# Patient Record
Sex: FEMALE | Race: WHITE | Employment: OTHER | URBAN - METROPOLITAN AREA
[De-identification: names, ages, dates, MRNs, and addresses within clinical notes are randomized per-mention and may not be internally consistent; named-entity substitution may affect disease eponyms.]

---

## 2022-02-09 ENCOUNTER — APPOINTMENT (OUTPATIENT)
Dept: GENERAL RADIOLOGY | Age: 77
DRG: 493 | End: 2022-02-09
Payer: COMMERCIAL

## 2022-02-09 ENCOUNTER — APPOINTMENT (OUTPATIENT)
Dept: CT IMAGING | Age: 77
DRG: 493 | End: 2022-02-09
Payer: COMMERCIAL

## 2022-02-09 ENCOUNTER — HOSPITAL ENCOUNTER (INPATIENT)
Age: 77
LOS: 3 days | Discharge: HOME HEALTH | DRG: 493 | End: 2022-02-12
Attending: EMERGENCY MEDICINE | Admitting: SURGERY
Payer: COMMERCIAL
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
## 2022-02-09 DIAGNOSIS — S82.409A: ICD-10-CM

## 2022-02-09 DIAGNOSIS — S82.209A: ICD-10-CM

## 2022-02-09 LAB
ABO/RH: NORMAL
ALLEN TEST: ABNORMAL
ANION GAP SERPL CALCULATED.3IONS-SCNC: 13 MMOL/L (ref 9–17)
ANTIBODY SCREEN: NEGATIVE
ARM BAND NUMBER: NORMAL
BLOOD BANK SPECIMEN: ABNORMAL
BNP INTERPRETATION: NORMAL
BUN BLDV-MCNC: 15 MG/DL (ref 8–23)
CARBOXYHEMOGLOBIN: 1.6 % (ref 0–5)
CHLORIDE BLD-SCNC: 98 MMOL/L (ref 98–107)
CO2: 21 MMOL/L (ref 20–31)
CREAT SERPL-MCNC: 0.94 MG/DL (ref 0.5–0.9)
ESTIMATED AVERAGE GLUCOSE: 166 MG/DL
ETHANOL PERCENT: <0.01 %
ETHANOL: <10 MG/DL
EXPIRATION DATE: NORMAL
FIO2: ABNORMAL
GFR AFRICAN AMERICAN: >60 ML/MIN
GFR NON-AFRICAN AMERICAN: 58 ML/MIN
GFR SERPL CREATININE-BSD FRML MDRD: ABNORMAL ML/MIN/{1.73_M2}
GFR SERPL CREATININE-BSD FRML MDRD: ABNORMAL ML/MIN/{1.73_M2}
GLUCOSE BLD-MCNC: 139 MG/DL (ref 65–105)
GLUCOSE BLD-MCNC: 152 MG/DL (ref 65–105)
GLUCOSE BLD-MCNC: 192 MG/DL (ref 70–99)
GLUCOSE BLD-MCNC: 224 MG/DL (ref 65–105)
HBA1C MFR BLD: 7.4 % (ref 4–6)
HCG QUALITATIVE: ABNORMAL
HCO3 VENOUS: 24.3 MMOL/L (ref 24–30)
HCT VFR BLD CALC: 47 % (ref 36.3–47.1)
HEMOGLOBIN: 14.4 G/DL (ref 11.9–15.1)
INR BLD: 0.9
MCH RBC QN AUTO: 25.9 PG (ref 25.2–33.5)
MCHC RBC AUTO-ENTMCNC: 30.6 G/DL (ref 28.4–34.8)
MCV RBC AUTO: 84.7 FL (ref 82.6–102.9)
METHEMOGLOBIN: ABNORMAL % (ref 0–1.5)
MODE: ABNORMAL
MYOGLOBIN: 92 NG/ML (ref 25–58)
NEGATIVE BASE EXCESS, VEN: 2.5 MMOL/L (ref 0–2)
NOTIFICATION TIME: ABNORMAL
NOTIFICATION: ABNORMAL
NRBC AUTOMATED: 0 PER 100 WBC
O2 DEVICE/FLOW/%: ABNORMAL
O2 SAT, VEN: 74.3 % (ref 60–85)
OXYHEMOGLOBIN: ABNORMAL % (ref 95–98)
PARTIAL THROMBOPLASTIN TIME: 23.8 SEC (ref 20.5–30.5)
PATIENT TEMP: 37
PCO2, VEN, TEMP ADJ: ABNORMAL MMHG (ref 39–55)
PCO2, VEN: 51.8 (ref 39–55)
PDW BLD-RTO: 14.6 % (ref 11.8–14.4)
PEEP/CPAP: ABNORMAL
PH VENOUS: 7.29 (ref 7.32–7.42)
PH, VEN, TEMP ADJ: ABNORMAL (ref 7.32–7.42)
PLATELET # BLD: 350 K/UL (ref 138–453)
PMV BLD AUTO: 9.3 FL (ref 8.1–13.5)
PO2, VEN, TEMP ADJ: ABNORMAL MMHG (ref 30–50)
PO2, VEN: 43.6 (ref 30–50)
POSITIVE BASE EXCESS, VEN: ABNORMAL MMOL/L (ref 0–2)
POTASSIUM SERPL-SCNC: 4.5 MMOL/L (ref 3.7–5.3)
PRO-BNP: 135 PG/ML
PROTHROMBIN TIME: 10.2 SEC (ref 9.1–12.3)
PSV: ABNORMAL
PT. POSITION: ABNORMAL
RBC # BLD: 5.55 M/UL (ref 3.95–5.11)
RESPIRATORY RATE: ABNORMAL
SAMPLE SITE: ABNORMAL
SARS-COV-2, RAPID: NOT DETECTED
SET RATE: ABNORMAL
SODIUM BLD-SCNC: 132 MMOL/L (ref 135–144)
SPECIMEN DESCRIPTION: NORMAL
TEXT FOR RESPIRATORY: ABNORMAL
TOTAL CK: 215 U/L (ref 26–192)
TOTAL HB: ABNORMAL G/DL (ref 12–16)
TOTAL RATE: ABNORMAL
TROPONIN INTERP: ABNORMAL
TROPONIN T: ABNORMAL NG/ML
TROPONIN, HIGH SENSITIVITY: 14 NG/L (ref 0–14)
VITAMIN D 25-HYDROXY: 9.4 NG/ML (ref 30–100)
VT: ABNORMAL
WBC # BLD: 12.7 K/UL (ref 3.5–11.3)

## 2022-02-09 PROCEDURE — 84703 CHORIONIC GONADOTROPIN ASSAY: CPT

## 2022-02-09 PROCEDURE — 71260 CT THORAX DX C+: CPT

## 2022-02-09 PROCEDURE — 6370000000 HC RX 637 (ALT 250 FOR IP): Performed by: STUDENT IN AN ORGANIZED HEALTH CARE EDUCATION/TRAINING PROGRAM

## 2022-02-09 PROCEDURE — 94761 N-INVAS EAR/PLS OXIMETRY MLT: CPT

## 2022-02-09 PROCEDURE — 82550 ASSAY OF CK (CPK): CPT

## 2022-02-09 PROCEDURE — 85027 COMPLETE CBC AUTOMATED: CPT

## 2022-02-09 PROCEDURE — 73610 X-RAY EXAM OF ANKLE: CPT

## 2022-02-09 PROCEDURE — 2580000003 HC RX 258: Performed by: STUDENT IN AN ORGANIZED HEALTH CARE EDUCATION/TRAINING PROGRAM

## 2022-02-09 PROCEDURE — 96374 THER/PROPH/DIAG INJ IV PUSH: CPT

## 2022-02-09 PROCEDURE — 6810039001 HC L1 TRAUMA PRIORITY

## 2022-02-09 PROCEDURE — 64450 NJX AA&/STRD OTHER PN/BRANCH: CPT

## 2022-02-09 PROCEDURE — 85610 PROTHROMBIN TIME: CPT

## 2022-02-09 PROCEDURE — 72125 CT NECK SPINE W/O DYE: CPT

## 2022-02-09 PROCEDURE — 84520 ASSAY OF UREA NITROGEN: CPT

## 2022-02-09 PROCEDURE — 87635 SARS-COV-2 COVID-19 AMP PRB: CPT

## 2022-02-09 PROCEDURE — 83036 HEMOGLOBIN GLYCOSYLATED A1C: CPT

## 2022-02-09 PROCEDURE — 84484 ASSAY OF TROPONIN QUANT: CPT

## 2022-02-09 PROCEDURE — 6360000004 HC RX CONTRAST MEDICATION: Performed by: SURGERY

## 2022-02-09 PROCEDURE — 83874 ASSAY OF MYOGLOBIN: CPT

## 2022-02-09 PROCEDURE — 3209999900 CT LUMBAR SPINE TRAUMA RECONSTRUCTION

## 2022-02-09 PROCEDURE — 86850 RBC ANTIBODY SCREEN: CPT

## 2022-02-09 PROCEDURE — 80051 ELECTROLYTE PANEL: CPT

## 2022-02-09 PROCEDURE — 82805 BLOOD GASES W/O2 SATURATION: CPT

## 2022-02-09 PROCEDURE — 82306 VITAMIN D 25 HYDROXY: CPT

## 2022-02-09 PROCEDURE — 2060000000 HC ICU INTERMEDIATE R&B

## 2022-02-09 PROCEDURE — 86901 BLOOD TYPING SEROLOGIC RH(D): CPT

## 2022-02-09 PROCEDURE — 6360000002 HC RX W HCPCS

## 2022-02-09 PROCEDURE — 85730 THROMBOPLASTIN TIME PARTIAL: CPT

## 2022-02-09 PROCEDURE — 93005 ELECTROCARDIOGRAM TRACING: CPT | Performed by: STUDENT IN AN ORGANIZED HEALTH CARE EDUCATION/TRAINING PROGRAM

## 2022-02-09 PROCEDURE — 6360000002 HC RX W HCPCS: Performed by: STUDENT IN AN ORGANIZED HEALTH CARE EDUCATION/TRAINING PROGRAM

## 2022-02-09 PROCEDURE — 83880 ASSAY OF NATRIURETIC PEPTIDE: CPT

## 2022-02-09 PROCEDURE — 6370000000 HC RX 637 (ALT 250 FOR IP): Performed by: NURSE PRACTITIONER

## 2022-02-09 PROCEDURE — 3209999900 CT THORACIC SPINE TRAUMA RECONSTRUCTION

## 2022-02-09 PROCEDURE — 70450 CT HEAD/BRAIN W/O DYE: CPT

## 2022-02-09 PROCEDURE — G0480 DRUG TEST DEF 1-7 CLASSES: HCPCS

## 2022-02-09 PROCEDURE — 73590 X-RAY EXAM OF LOWER LEG: CPT

## 2022-02-09 PROCEDURE — 82947 ASSAY GLUCOSE BLOOD QUANT: CPT

## 2022-02-09 PROCEDURE — 73030 X-RAY EXAM OF SHOULDER: CPT

## 2022-02-09 PROCEDURE — 86900 BLOOD TYPING SEROLOGIC ABO: CPT

## 2022-02-09 PROCEDURE — 82565 ASSAY OF CREATININE: CPT

## 2022-02-09 PROCEDURE — 0QSHXZZ REPOSITION LEFT TIBIA, EXTERNAL APPROACH: ICD-10-PCS | Performed by: ORTHOPAEDIC SURGERY

## 2022-02-09 PROCEDURE — 99285 EMERGENCY DEPT VISIT HI MDM: CPT

## 2022-02-09 RX ORDER — DEXTROSE MONOHYDRATE 50 MG/ML
100 INJECTION, SOLUTION INTRAVENOUS PRN
Status: DISCONTINUED | OUTPATIENT
Start: 2022-02-09 | End: 2022-02-12 | Stop reason: HOSPADM

## 2022-02-09 RX ORDER — POLYETHYLENE GLYCOL 3350 17 G/17G
17 POWDER, FOR SOLUTION ORAL DAILY
Status: DISCONTINUED | OUTPATIENT
Start: 2022-02-09 | End: 2022-02-12 | Stop reason: HOSPADM

## 2022-02-09 RX ORDER — LIDOCAINE HYDROCHLORIDE 10 MG/ML
20 INJECTION, SOLUTION INFILTRATION; PERINEURAL ONCE
Status: DISCONTINUED | OUTPATIENT
Start: 2022-02-09 | End: 2022-02-11

## 2022-02-09 RX ORDER — DICYCLOMINE HYDROCHLORIDE 10 MG/1
10 CAPSULE ORAL 2 TIMES DAILY PRN
COMMUNITY

## 2022-02-09 RX ORDER — SODIUM CHLORIDE 0.9 % (FLUSH) 0.9 %
5-40 SYRINGE (ML) INJECTION PRN
Status: DISCONTINUED | OUTPATIENT
Start: 2022-02-09 | End: 2022-02-12 | Stop reason: HOSPADM

## 2022-02-09 RX ORDER — GABAPENTIN 300 MG/1
300 CAPSULE ORAL EVERY 8 HOURS
Status: DISCONTINUED | OUTPATIENT
Start: 2022-02-09 | End: 2022-02-12 | Stop reason: HOSPADM

## 2022-02-09 RX ORDER — BISACODYL 10 MG
10 SUPPOSITORY, RECTAL RECTAL DAILY PRN
Status: DISCONTINUED | OUTPATIENT
Start: 2022-02-09 | End: 2022-02-12 | Stop reason: HOSPADM

## 2022-02-09 RX ORDER — NICOTINE POLACRILEX 4 MG
15 LOZENGE BUCCAL PRN
Status: DISCONTINUED | OUTPATIENT
Start: 2022-02-09 | End: 2022-02-12 | Stop reason: HOSPADM

## 2022-02-09 RX ORDER — BRIMONIDINE TARTRATE 2 MG/ML
1 SOLUTION/ DROPS OPHTHALMIC 2 TIMES DAILY
COMMUNITY
End: 2022-04-26 | Stop reason: ALTCHOICE

## 2022-02-09 RX ORDER — OXYCODONE HYDROCHLORIDE 5 MG/1
5 TABLET ORAL EVERY 6 HOURS PRN
Status: DISCONTINUED | OUTPATIENT
Start: 2022-02-09 | End: 2022-02-12

## 2022-02-09 RX ORDER — METHOCARBAMOL 750 MG/1
750 TABLET, FILM COATED ORAL EVERY 6 HOURS
Status: DISCONTINUED | OUTPATIENT
Start: 2022-02-09 | End: 2022-02-12 | Stop reason: HOSPADM

## 2022-02-09 RX ORDER — BUSPIRONE HYDROCHLORIDE 5 MG/1
7.5 TABLET ORAL 2 TIMES DAILY
Status: DISCONTINUED | OUTPATIENT
Start: 2022-02-09 | End: 2022-02-12 | Stop reason: HOSPADM

## 2022-02-09 RX ORDER — SODIUM CHLORIDE, SODIUM LACTATE, POTASSIUM CHLORIDE, CALCIUM CHLORIDE 600; 310; 30; 20 MG/100ML; MG/100ML; MG/100ML; MG/100ML
INJECTION, SOLUTION INTRAVENOUS CONTINUOUS
Status: DISCONTINUED | OUTPATIENT
Start: 2022-02-09 | End: 2022-02-09

## 2022-02-09 RX ORDER — ONDANSETRON 4 MG/1
4 TABLET, ORALLY DISINTEGRATING ORAL EVERY 8 HOURS PRN
Status: DISCONTINUED | OUTPATIENT
Start: 2022-02-09 | End: 2022-02-12 | Stop reason: HOSPADM

## 2022-02-09 RX ORDER — FENTANYL CITRATE 50 UG/ML
50 INJECTION, SOLUTION INTRAMUSCULAR; INTRAVENOUS ONCE
Status: COMPLETED | OUTPATIENT
Start: 2022-02-09 | End: 2022-02-09

## 2022-02-09 RX ORDER — FENTANYL CITRATE 50 UG/ML
INJECTION, SOLUTION INTRAMUSCULAR; INTRAVENOUS
Status: DISCONTINUED
Start: 2022-02-09 | End: 2022-02-09

## 2022-02-09 RX ORDER — ACETAMINOPHEN 500 MG
1000 TABLET ORAL EVERY 8 HOURS SCHEDULED
Status: DISCONTINUED | OUTPATIENT
Start: 2022-02-09 | End: 2022-02-12 | Stop reason: HOSPADM

## 2022-02-09 RX ORDER — BUSPIRONE HYDROCHLORIDE 7.5 MG/1
7.5 TABLET ORAL 2 TIMES DAILY
COMMUNITY
Start: 2021-11-23

## 2022-02-09 RX ORDER — ALENDRONATE SODIUM 70 MG/1
70 TABLET ORAL WEEKLY
COMMUNITY

## 2022-02-09 RX ORDER — ASPIRIN 81 MG/1
81 TABLET ORAL DAILY
Status: ON HOLD | COMMUNITY
End: 2022-04-27 | Stop reason: HOSPADM

## 2022-02-09 RX ORDER — FUROSEMIDE 40 MG/1
TABLET ORAL
COMMUNITY
Start: 2022-01-26 | End: 2022-04-26 | Stop reason: ALTCHOICE

## 2022-02-09 RX ORDER — SODIUM PHOSPHATE, DIBASIC AND SODIUM PHOSPHATE, MONOBASIC 7; 19 G/133ML; G/133ML
1 ENEMA RECTAL DAILY PRN
Status: DISCONTINUED | OUTPATIENT
Start: 2022-02-09 | End: 2022-02-10

## 2022-02-09 RX ORDER — SODIUM CHLORIDE 0.9 % (FLUSH) 0.9 %
5-40 SYRINGE (ML) INJECTION EVERY 12 HOURS SCHEDULED
Status: DISCONTINUED | OUTPATIENT
Start: 2022-02-09 | End: 2022-02-12 | Stop reason: HOSPADM

## 2022-02-09 RX ORDER — ONDANSETRON 2 MG/ML
4 INJECTION INTRAMUSCULAR; INTRAVENOUS EVERY 6 HOURS PRN
Status: DISCONTINUED | OUTPATIENT
Start: 2022-02-09 | End: 2022-02-12 | Stop reason: HOSPADM

## 2022-02-09 RX ORDER — SODIUM CHLORIDE 9 MG/ML
25 INJECTION, SOLUTION INTRAVENOUS PRN
Status: DISCONTINUED | OUTPATIENT
Start: 2022-02-09 | End: 2022-02-12 | Stop reason: HOSPADM

## 2022-02-09 RX ORDER — SACUBITRIL AND VALSARTAN 49; 51 MG/1; MG/1
1 TABLET, FILM COATED ORAL 2 TIMES DAILY
COMMUNITY
Start: 2021-12-08

## 2022-02-09 RX ORDER — CITALOPRAM 20 MG/1
40 TABLET ORAL DAILY
Status: DISCONTINUED | OUTPATIENT
Start: 2022-02-09 | End: 2022-02-12 | Stop reason: HOSPADM

## 2022-02-09 RX ORDER — CITALOPRAM 40 MG/1
TABLET ORAL
COMMUNITY
Start: 2021-11-29

## 2022-02-09 RX ORDER — DEXTROSE MONOHYDRATE 25 G/50ML
12.5 INJECTION, SOLUTION INTRAVENOUS PRN
Status: DISCONTINUED | OUTPATIENT
Start: 2022-02-09 | End: 2022-02-12 | Stop reason: HOSPADM

## 2022-02-09 RX ORDER — ERGOCALCIFEROL 1.25 MG/1
50000 CAPSULE ORAL WEEKLY
Qty: 8 CAPSULE | Refills: 0 | Status: SHIPPED | OUTPATIENT
Start: 2022-02-09 | End: 2022-03-31

## 2022-02-09 RX ADMIN — GABAPENTIN 300 MG: 300 CAPSULE ORAL at 13:31

## 2022-02-09 RX ADMIN — FENTANYL CITRATE 50 MCG: 50 INJECTION, SOLUTION INTRAMUSCULAR; INTRAVENOUS at 23:15

## 2022-02-09 RX ADMIN — INSULIN LISPRO 6 UNITS: 100 INJECTION, SOLUTION INTRAVENOUS; SUBCUTANEOUS at 16:54

## 2022-02-09 RX ADMIN — INSULIN LISPRO 3 UNITS: 100 INJECTION, SOLUTION INTRAVENOUS; SUBCUTANEOUS at 21:36

## 2022-02-09 RX ADMIN — OXYCODONE 5 MG: 5 TABLET ORAL at 18:20

## 2022-02-09 RX ADMIN — ACETAMINOPHEN 1000 MG: 500 TABLET ORAL at 13:31

## 2022-02-09 RX ADMIN — GABAPENTIN 300 MG: 300 CAPSULE ORAL at 21:33

## 2022-02-09 RX ADMIN — FENTANYL CITRATE 50 MCG: 50 INJECTION, SOLUTION INTRAMUSCULAR; INTRAVENOUS at 10:18

## 2022-02-09 RX ADMIN — IOPAMIDOL 100 ML: 755 INJECTION, SOLUTION INTRAVENOUS at 10:43

## 2022-02-09 RX ADMIN — BUSPIRONE HYDROCHLORIDE 7.5 MG: 5 TABLET ORAL at 21:33

## 2022-02-09 RX ADMIN — ACETAMINOPHEN 1000 MG: 500 TABLET ORAL at 21:33

## 2022-02-09 RX ADMIN — SODIUM CHLORIDE, PRESERVATIVE FREE 10 ML: 5 INJECTION INTRAVENOUS at 21:34

## 2022-02-09 RX ADMIN — DEXTROSE MONOHYDRATE 2000 MG: 50 INJECTION, SOLUTION INTRAVENOUS at 13:04

## 2022-02-09 RX ADMIN — METHOCARBAMOL TABLETS 750 MG: 750 TABLET, COATED ORAL at 19:51

## 2022-02-09 RX ADMIN — METHOCARBAMOL TABLETS 750 MG: 750 TABLET, COATED ORAL at 13:31

## 2022-02-09 ASSESSMENT — ENCOUNTER SYMPTOMS
VOMITING: 0
BACK PAIN: 0
COUGH: 0
NAUSEA: 0
ABDOMINAL PAIN: 0
SORE THROAT: 0
SHORTNESS OF BREATH: 0
PHOTOPHOBIA: 0

## 2022-02-09 ASSESSMENT — PAIN SCALES - GENERAL
PAINLEVEL_OUTOF10: 10
PAINLEVEL_OUTOF10: 8
PAINLEVEL_OUTOF10: 10

## 2022-02-09 ASSESSMENT — PAIN DESCRIPTION - ORIENTATION
ORIENTATION: LEFT;LOWER
ORIENTATION: LEFT;LOWER

## 2022-02-09 ASSESSMENT — PAIN DESCRIPTION - PAIN TYPE
TYPE: ACUTE PAIN
TYPE: ACUTE PAIN

## 2022-02-09 ASSESSMENT — PAIN DESCRIPTION - DESCRIPTORS: DESCRIPTORS: DISCOMFORT;SHARP

## 2022-02-09 ASSESSMENT — PAIN DESCRIPTION - FREQUENCY: FREQUENCY: CONTINUOUS

## 2022-02-09 ASSESSMENT — PAIN DESCRIPTION - LOCATION
LOCATION: LEG
LOCATION: LEG

## 2022-02-09 NOTE — ED NOTES
Bed: 16  Expected date:   Expected time:   Means of arrival:   Comments:     Kenia Davidson RN  02/09/22 [id number]
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
## 2022-02-09 NOTE — ED NOTES
Report given to 4B. All questions answered at this time. Ortho at bedside to reduce and splint leg.       Kandice Baird RN  02/09/22 7035
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
## 2022-02-09 NOTE — CARE COORDINATION
Case Management Initial Discharge Plan  Kwaku George,             Met with:patient to discuss discharge plans. Information verified: address, contacts, phone number, , insurance Yes  Home address: [address] [place] [address]. [address] [place] [address] Provider: [employer]    Emergency Contact/Next of Kin name & number: Liliana Jean-Baptiste [phone]  Who are involved in patient's support system? Daughter    PCP: Dr. Manjula Ramos ,[facility] Date of last visit: 2 weeks ago      Discharge Planning    Living Arrangements:    Lives alone    Home has multiple stories-pt lives on second floor in senior apartment with elevator  0 stairs to climb to get into front door,       Patient able to perform ADL's:Independent    Current Services (outpatient & in home)None  DME equipment: None  DME provider:    Is patient receiving oral anticoagulation therapy? No    If indicated:   Physician managing anticoagulation treatment:   Where does patient obtain lab work for ATC treatment?        Potential Assistance Needed:   Home care and DME, Cane/walker TBD after surgery    Patient agreeable to home care: Yes  Freedom of choice provided:  yes, would like [facility]    Prior SNF/Rehab Placement and Facility: No  Agreeable to SNF/Rehab: No  [place] of choice provided: n/a     Evaluation: no    Expected Discharge date:       Patient expects to be discharged to:   Daughter  [facility] home  Discharge address: [address] [place] [address] [address]  If home: is the family and/or caregiver wiling & able to provide support at home? yes  Who will be providing this support? daughter    Follow Up Appointment: Best Day/ Time:      Transportation provider: daughter  Transportation arrangements needed for discharge: No    Readmission Risk              Risk of Unplanned Readmission:  9             Does patient have a readmission risk score greater than 14?: No  If yes, follow-up appointment must be made within 7 days of discharge. Goals of Care:       Educated pt on transitional options, provided freedom of choice and are agreeable with plan      Discharge Plan: Pt plans to go to daughter Rebeca's home at discharge. Agreeable to home care and has chosen [facility].   Daughter will transport home    Discharge address:  [address] [place] [address]          Electronically signed by ANA Cox on 2/9/22 at 3:07 PM EST
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
## 2022-02-09 NOTE — CARE COORDINATION
Transitional Planning    Received call from [facility]. They are following, unsure if they can take patient yet d/t staffing.

## 2022-02-09 NOTE — PROGRESS NOTES
Speech Language Pathology  [address]  Speech Language Pathology    SPEECH/COGNITIVE ASSESSMENT    NO LOC,CHI OR CVA/TIA - ST TO DEFER AT THIS TIME      Date: 2/9/2022  Patient Name: Tejas Bates  YOB: 1945   AGE: 77 y.o. MRN: [id number]        PT NOT SEEN FOR SPEECH OR COGNITIVE ASSESSMENT AT THIS TIME AS NO LOC, CHI OR CVA/TIA IS DOCUMENTED. ST TO DEFER AT THIS TIME. PLEASE RE-COSULT AS NEEDED.       Scott Padilla, SLP  2/9/2022  12:41 PM

## 2022-02-10 ENCOUNTER — ANESTHESIA EVENT (OUTPATIENT)
Dept: OPERATING ROOM | Age: 77
DRG: 493 | End: 2022-02-10
Payer: COMMERCIAL

## 2022-02-10 ENCOUNTER — APPOINTMENT (OUTPATIENT)
Dept: GENERAL RADIOLOGY | Age: 77
DRG: 493 | End: 2022-02-10
Payer: COMMERCIAL

## 2022-02-10 ENCOUNTER — ANESTHESIA (OUTPATIENT)
Dept: OPERATING ROOM | Age: 77
DRG: 493 | End: 2022-02-10
Payer: COMMERCIAL

## 2022-02-10 ENCOUNTER — APPOINTMENT (OUTPATIENT)
Dept: CT IMAGING | Age: 77
DRG: 493 | End: 2022-02-10
Payer: COMMERCIAL

## 2022-02-10 VITALS — OXYGEN SATURATION: 100 % | TEMPERATURE: 98.7 F | SYSTOLIC BLOOD PRESSURE: 180 MMHG | DIASTOLIC BLOOD PRESSURE: 109 MMHG

## 2022-02-10 LAB
ABSOLUTE EOS #: 0.04 K/UL (ref 0–0.44)
ABSOLUTE EOS #: 0.04 K/UL (ref 0–0.44)
ABSOLUTE IMMATURE GRANULOCYTE: 0.04 K/UL (ref 0–0.3)
ABSOLUTE IMMATURE GRANULOCYTE: 0.05 K/UL (ref 0–0.3)
ABSOLUTE LYMPH #: 2.27 K/UL (ref 1.1–3.7)
ABSOLUTE LYMPH #: 2.91 K/UL (ref 1.1–3.7)
ABSOLUTE MONO #: 1.22 K/UL (ref 0.1–1.2)
ABSOLUTE MONO #: 1.41 K/UL (ref 0.1–1.2)
ANION GAP SERPL CALCULATED.3IONS-SCNC: 11 MMOL/L (ref 9–17)
ANION GAP SERPL CALCULATED.3IONS-SCNC: 14 MMOL/L (ref 9–17)
BASOPHILS # BLD: 0 % (ref 0–2)
BASOPHILS # BLD: 0 % (ref 0–2)
BASOPHILS ABSOLUTE: 0.03 K/UL (ref 0–0.2)
BASOPHILS ABSOLUTE: 0.04 K/UL (ref 0–0.2)
BUN BLDV-MCNC: 15 MG/DL (ref 8–23)
BUN BLDV-MCNC: 17 MG/DL (ref 8–23)
BUN/CREAT BLD: ABNORMAL (ref 9–20)
BUN/CREAT BLD: ABNORMAL (ref 9–20)
CALCIUM SERPL-MCNC: 8.8 MG/DL (ref 8.6–10.4)
CALCIUM SERPL-MCNC: 8.9 MG/DL (ref 8.6–10.4)
CHLORIDE BLD-SCNC: 102 MMOL/L (ref 98–107)
CHLORIDE BLD-SCNC: 99 MMOL/L (ref 98–107)
CO2: 21 MMOL/L (ref 20–31)
CO2: 21 MMOL/L (ref 20–31)
CREAT SERPL-MCNC: 0.85 MG/DL (ref 0.5–0.9)
CREAT SERPL-MCNC: 1.01 MG/DL (ref 0.5–0.9)
DIFFERENTIAL TYPE: ABNORMAL
DIFFERENTIAL TYPE: ABNORMAL
EKG ATRIAL RATE: 79 BPM
EKG P-R INTERVAL: 184 MS
EKG Q-T INTERVAL: 436 MS
EKG QRS DURATION: 90 MS
EKG QTC CALCULATION (BAZETT): 499 MS
EKG R AXIS: 7 DEGREES
EKG T AXIS: 30 DEGREES
EKG VENTRICULAR RATE: 79 BPM
EOSINOPHILS RELATIVE PERCENT: 0 % (ref 1–4)
EOSINOPHILS RELATIVE PERCENT: 0 % (ref 1–4)
GFR AFRICAN AMERICAN: >60 ML/MIN
GFR AFRICAN AMERICAN: >60 ML/MIN
GFR NON-AFRICAN AMERICAN: 53 ML/MIN
GFR NON-AFRICAN AMERICAN: >60 ML/MIN
GFR SERPL CREATININE-BSD FRML MDRD: ABNORMAL ML/MIN/{1.73_M2}
GLUCOSE BLD-MCNC: 107 MG/DL (ref 70–99)
GLUCOSE BLD-MCNC: 108 MG/DL (ref 65–105)
GLUCOSE BLD-MCNC: 139 MG/DL (ref 70–99)
GLUCOSE BLD-MCNC: 144 MG/DL (ref 65–105)
GLUCOSE BLD-MCNC: 165 MG/DL (ref 65–105)
GLUCOSE BLD-MCNC: 172 MG/DL (ref 65–105)
HCT VFR BLD CALC: 37.4 % (ref 36.3–47.1)
HCT VFR BLD CALC: 40.5 % (ref 36.3–47.1)
HEMOGLOBIN: 12.2 G/DL (ref 11.9–15.1)
HEMOGLOBIN: 12.8 G/DL (ref 11.9–15.1)
IMMATURE GRANULOCYTES: 0 %
IMMATURE GRANULOCYTES: 0 %
LYMPHOCYTES # BLD: 18 % (ref 24–43)
LYMPHOCYTES # BLD: 26 % (ref 24–43)
MCH RBC QN AUTO: 26.7 PG (ref 25.2–33.5)
MCH RBC QN AUTO: 26.8 PG (ref 25.2–33.5)
MCHC RBC AUTO-ENTMCNC: 31.6 G/DL (ref 28.4–34.8)
MCHC RBC AUTO-ENTMCNC: 32.6 G/DL (ref 28.4–34.8)
MCV RBC AUTO: 82.2 FL (ref 82.6–102.9)
MCV RBC AUTO: 84.4 FL (ref 82.6–102.9)
MONOCYTES # BLD: 11 % (ref 3–12)
MONOCYTES # BLD: 11 % (ref 3–12)
NRBC AUTOMATED: 0 PER 100 WBC
NRBC AUTOMATED: 0 PER 100 WBC
PDW BLD-RTO: 14.8 % (ref 11.8–14.4)
PDW BLD-RTO: 14.9 % (ref 11.8–14.4)
PLATELET # BLD: 283 K/UL (ref 138–453)
PLATELET # BLD: 315 K/UL (ref 138–453)
PLATELET ESTIMATE: ABNORMAL
PLATELET ESTIMATE: ABNORMAL
PMV BLD AUTO: 9 FL (ref 8.1–13.5)
PMV BLD AUTO: 9 FL (ref 8.1–13.5)
POTASSIUM SERPL-SCNC: 3.8 MMOL/L (ref 3.7–5.3)
POTASSIUM SERPL-SCNC: 4 MMOL/L (ref 3.7–5.3)
RBC # BLD: 4.55 M/UL (ref 3.95–5.11)
RBC # BLD: 4.8 M/UL (ref 3.95–5.11)
RBC # BLD: ABNORMAL 10*6/UL
RBC # BLD: ABNORMAL 10*6/UL
SEG NEUTROPHILS: 63 % (ref 36–65)
SEG NEUTROPHILS: 71 % (ref 36–65)
SEGMENTED NEUTROPHILS ABSOLUTE COUNT: 6.87 K/UL (ref 1.5–8.1)
SEGMENTED NEUTROPHILS ABSOLUTE COUNT: 9.18 K/UL (ref 1.5–8.1)
SODIUM BLD-SCNC: 134 MMOL/L (ref 135–144)
SODIUM BLD-SCNC: 134 MMOL/L (ref 135–144)
WBC # BLD: 11.1 K/UL (ref 3.5–11.3)
WBC # BLD: 13 K/UL (ref 3.5–11.3)
WBC # BLD: ABNORMAL 10*3/UL
WBC # BLD: ABNORMAL 10*3/UL

## 2022-02-10 PROCEDURE — 2580000003 HC RX 258: Performed by: STUDENT IN AN ORGANIZED HEALTH CARE EDUCATION/TRAINING PROGRAM

## 2022-02-10 PROCEDURE — 73590 X-RAY EXAM OF LOWER LEG: CPT

## 2022-02-10 PROCEDURE — C1713 ANCHOR/SCREW BN/BN,TIS/BN: HCPCS | Performed by: ORTHOPAEDIC SURGERY

## 2022-02-10 PROCEDURE — 7100000000 HC PACU RECOVERY - FIRST 15 MIN: Performed by: ORTHOPAEDIC SURGERY

## 2022-02-10 PROCEDURE — 2580000003 HC RX 258: Performed by: NURSE ANESTHETIST, CERTIFIED REGISTERED

## 2022-02-10 PROCEDURE — 3E0T3BZ INTRODUCTION OF ANESTHETIC AGENT INTO PERIPHERAL NERVES AND PLEXI, PERCUTANEOUS APPROACH: ICD-10-PCS | Performed by: ANESTHESIOLOGY

## 2022-02-10 PROCEDURE — 36415 COLL VENOUS BLD VENIPUNCTURE: CPT

## 2022-02-10 PROCEDURE — 85025 COMPLETE CBC W/AUTO DIFF WBC: CPT

## 2022-02-10 PROCEDURE — 6370000000 HC RX 637 (ALT 250 FOR IP): Performed by: STUDENT IN AN ORGANIZED HEALTH CARE EDUCATION/TRAINING PROGRAM

## 2022-02-10 PROCEDURE — 27825 TREAT LOWER LEG FRACTURE: CPT | Performed by: ORTHOPAEDIC SURGERY

## 2022-02-10 PROCEDURE — A4217 STERILE WATER/SALINE, 500 ML: HCPCS | Performed by: ORTHOPAEDIC SURGERY

## 2022-02-10 PROCEDURE — 2500000003 HC RX 250 WO HCPCS: Performed by: NURSE ANESTHETIST, CERTIFIED REGISTERED

## 2022-02-10 PROCEDURE — 0QSH35Z REPOSITION LEFT TIBIA WITH EXTERNAL FIXATION DEVICE, PERCUTANEOUS APPROACH: ICD-10-PCS | Performed by: ORTHOPAEDIC SURGERY

## 2022-02-10 PROCEDURE — 73700 CT LOWER EXTREMITY W/O DYE: CPT

## 2022-02-10 PROCEDURE — 3209999900 FLUORO FOR SURGICAL PROCEDURES

## 2022-02-10 PROCEDURE — 3600000004 HC SURGERY LEVEL 4 BASE: Performed by: ORTHOPAEDIC SURGERY

## 2022-02-10 PROCEDURE — 7100000001 HC PACU RECOVERY - ADDTL 15 MIN: Performed by: ORTHOPAEDIC SURGERY

## 2022-02-10 PROCEDURE — 99221 1ST HOSP IP/OBS SF/LOW 40: CPT | Performed by: ORTHOPAEDIC SURGERY

## 2022-02-10 PROCEDURE — 64447 NJX AA&/STRD FEMORAL NRV IMG: CPT | Performed by: ANESTHESIOLOGY

## 2022-02-10 PROCEDURE — 3600000014 HC SURGERY LEVEL 4 ADDTL 15MIN: Performed by: ORTHOPAEDIC SURGERY

## 2022-02-10 PROCEDURE — 64445 NJX AA&/STRD SCIATIC NRV IMG: CPT | Performed by: ANESTHESIOLOGY

## 2022-02-10 PROCEDURE — 2500000003 HC RX 250 WO HCPCS: Performed by: ANESTHESIOLOGY

## 2022-02-10 PROCEDURE — 2709999900 HC NON-CHARGEABLE SUPPLY: Performed by: ORTHOPAEDIC SURGERY

## 2022-02-10 PROCEDURE — 6360000002 HC RX W HCPCS: Performed by: NURSE ANESTHETIST, CERTIFIED REGISTERED

## 2022-02-10 PROCEDURE — 3700000001 HC ADD 15 MINUTES (ANESTHESIA): Performed by: ORTHOPAEDIC SURGERY

## 2022-02-10 PROCEDURE — 82947 ASSAY GLUCOSE BLOOD QUANT: CPT

## 2022-02-10 PROCEDURE — A4216 STERILE WATER/SALINE, 10 ML: HCPCS | Performed by: NURSE ANESTHETIST, CERTIFIED REGISTERED

## 2022-02-10 PROCEDURE — 2720000010 HC SURG SUPPLY STERILE: Performed by: ORTHOPAEDIC SURGERY

## 2022-02-10 PROCEDURE — 2060000000 HC ICU INTERMEDIATE R&B

## 2022-02-10 PROCEDURE — 20692 APPL MLTPLN UNI EXT FIXJ SYS: CPT | Performed by: ORTHOPAEDIC SURGERY

## 2022-02-10 PROCEDURE — 2580000003 HC RX 258: Performed by: ORTHOPAEDIC SURGERY

## 2022-02-10 PROCEDURE — 80048 BASIC METABOLIC PNL TOTAL CA: CPT

## 2022-02-10 PROCEDURE — 73610 X-RAY EXAM OF ANKLE: CPT

## 2022-02-10 PROCEDURE — 93010 ELECTROCARDIOGRAM REPORT: CPT | Performed by: INTERNAL MEDICINE

## 2022-02-10 PROCEDURE — 6360000002 HC RX W HCPCS

## 2022-02-10 PROCEDURE — 3700000000 HC ANESTHESIA ATTENDED CARE: Performed by: ORTHOPAEDIC SURGERY

## 2022-02-10 PROCEDURE — 6360000002 HC RX W HCPCS: Performed by: STUDENT IN AN ORGANIZED HEALTH CARE EDUCATION/TRAINING PROGRAM

## 2022-02-10 RX ORDER — BUPIVACAINE HYDROCHLORIDE 5 MG/ML
INJECTION, SOLUTION EPIDURAL; INTRACAUDAL
Status: COMPLETED | OUTPATIENT
Start: 2022-02-10 | End: 2022-02-10

## 2022-02-10 RX ORDER — 0.9 % SODIUM CHLORIDE 0.9 %
500 INTRAVENOUS SOLUTION INTRAVENOUS
Status: DISCONTINUED | OUTPATIENT
Start: 2022-02-10 | End: 2022-02-10 | Stop reason: HOSPADM

## 2022-02-10 RX ORDER — ONDANSETRON 2 MG/ML
4 INJECTION INTRAMUSCULAR; INTRAVENOUS
Status: DISCONTINUED | OUTPATIENT
Start: 2022-02-10 | End: 2022-02-10 | Stop reason: HOSPADM

## 2022-02-10 RX ORDER — PROMETHAZINE HYDROCHLORIDE 25 MG/ML
6.25 INJECTION, SOLUTION INTRAMUSCULAR; INTRAVENOUS
Status: DISCONTINUED | OUTPATIENT
Start: 2022-02-10 | End: 2022-02-10 | Stop reason: HOSPADM

## 2022-02-10 RX ORDER — FENTANYL CITRATE 50 UG/ML
50 INJECTION, SOLUTION INTRAMUSCULAR; INTRAVENOUS EVERY 5 MIN PRN
Status: DISCONTINUED | OUTPATIENT
Start: 2022-02-10 | End: 2022-02-10 | Stop reason: HOSPADM

## 2022-02-10 RX ORDER — FENTANYL CITRATE 50 UG/ML
25 INJECTION, SOLUTION INTRAMUSCULAR; INTRAVENOUS EVERY 5 MIN PRN
Status: DISCONTINUED | OUTPATIENT
Start: 2022-02-10 | End: 2022-02-10 | Stop reason: HOSPADM

## 2022-02-10 RX ORDER — FENTANYL CITRATE 50 UG/ML
INJECTION, SOLUTION INTRAMUSCULAR; INTRAVENOUS PRN
Status: DISCONTINUED | OUTPATIENT
Start: 2022-02-10 | End: 2022-02-10 | Stop reason: SDUPTHER

## 2022-02-10 RX ORDER — ROCURONIUM BROMIDE 10 MG/ML
INJECTION, SOLUTION INTRAVENOUS PRN
Status: DISCONTINUED | OUTPATIENT
Start: 2022-02-10 | End: 2022-02-10 | Stop reason: SDUPTHER

## 2022-02-10 RX ORDER — ALENDRONATE SODIUM 70 MG/1
70 TABLET ORAL WEEKLY
Status: DISCONTINUED | OUTPATIENT
Start: 2022-02-10 | End: 2022-02-10

## 2022-02-10 RX ORDER — SODIUM CHLORIDE 9 MG/ML
INJECTION INTRAVENOUS PRN
Status: DISCONTINUED | OUTPATIENT
Start: 2022-02-10 | End: 2022-02-10 | Stop reason: SDUPTHER

## 2022-02-10 RX ORDER — MIDAZOLAM HYDROCHLORIDE 1 MG/ML
INJECTION INTRAMUSCULAR; INTRAVENOUS
Status: COMPLETED
Start: 2022-02-10 | End: 2022-02-10

## 2022-02-10 RX ORDER — LABETALOL HYDROCHLORIDE 5 MG/ML
5 INJECTION, SOLUTION INTRAVENOUS EVERY 10 MIN PRN
Status: DISCONTINUED | OUTPATIENT
Start: 2022-02-10 | End: 2022-02-10 | Stop reason: HOSPADM

## 2022-02-10 RX ORDER — MIDAZOLAM HYDROCHLORIDE 2 MG/2ML
2 INJECTION, SOLUTION INTRAMUSCULAR; INTRAVENOUS ONCE
Status: COMPLETED | OUTPATIENT
Start: 2022-02-10 | End: 2022-02-10

## 2022-02-10 RX ORDER — OXYCODONE HYDROCHLORIDE AND ACETAMINOPHEN 5; 325 MG/1; MG/1
1 TABLET ORAL EVERY 4 HOURS PRN
Status: DISCONTINUED | OUTPATIENT
Start: 2022-02-10 | End: 2022-02-10 | Stop reason: HOSPADM

## 2022-02-10 RX ORDER — EPHEDRINE SULFATE/0.9% NACL/PF 50 MG/5 ML
SYRINGE (ML) INTRAVENOUS PRN
Status: DISCONTINUED | OUTPATIENT
Start: 2022-02-10 | End: 2022-02-10 | Stop reason: SDUPTHER

## 2022-02-10 RX ORDER — LIDOCAINE HYDROCHLORIDE 10 MG/ML
INJECTION, SOLUTION EPIDURAL; INFILTRATION; INTRACAUDAL; PERINEURAL PRN
Status: DISCONTINUED | OUTPATIENT
Start: 2022-02-10 | End: 2022-02-10 | Stop reason: SDUPTHER

## 2022-02-10 RX ORDER — FENTANYL CITRATE 50 UG/ML
100 INJECTION, SOLUTION INTRAMUSCULAR; INTRAVENOUS ONCE
Status: COMPLETED | OUTPATIENT
Start: 2022-02-10 | End: 2022-02-10

## 2022-02-10 RX ORDER — MAGNESIUM HYDROXIDE 1200 MG/15ML
LIQUID ORAL CONTINUOUS PRN
Status: COMPLETED | OUTPATIENT
Start: 2022-02-10 | End: 2022-02-10

## 2022-02-10 RX ORDER — DIPHENHYDRAMINE HYDROCHLORIDE 50 MG/ML
12.5 INJECTION INTRAMUSCULAR; INTRAVENOUS
Status: DISCONTINUED | OUTPATIENT
Start: 2022-02-10 | End: 2022-02-10 | Stop reason: HOSPADM

## 2022-02-10 RX ORDER — FENTANYL CITRATE 50 UG/ML
INJECTION, SOLUTION INTRAMUSCULAR; INTRAVENOUS
Status: COMPLETED
Start: 2022-02-10 | End: 2022-02-10

## 2022-02-10 RX ORDER — HYDRALAZINE HYDROCHLORIDE 20 MG/ML
5 INJECTION INTRAMUSCULAR; INTRAVENOUS EVERY 10 MIN PRN
Status: DISCONTINUED | OUTPATIENT
Start: 2022-02-10 | End: 2022-02-10 | Stop reason: HOSPADM

## 2022-02-10 RX ORDER — CEFAZOLIN SODIUM 2 G/50ML
SOLUTION INTRAVENOUS PRN
Status: DISCONTINUED | OUTPATIENT
Start: 2022-02-10 | End: 2022-02-10 | Stop reason: SDUPTHER

## 2022-02-10 RX ORDER — SODIUM CHLORIDE, SODIUM LACTATE, POTASSIUM CHLORIDE, CALCIUM CHLORIDE 600; 310; 30; 20 MG/100ML; MG/100ML; MG/100ML; MG/100ML
INJECTION, SOLUTION INTRAVENOUS CONTINUOUS PRN
Status: DISCONTINUED | OUTPATIENT
Start: 2022-02-10 | End: 2022-02-10 | Stop reason: SDUPTHER

## 2022-02-10 RX ORDER — PROPOFOL 10 MG/ML
INJECTION, EMULSION INTRAVENOUS PRN
Status: DISCONTINUED | OUTPATIENT
Start: 2022-02-10 | End: 2022-02-10 | Stop reason: SDUPTHER

## 2022-02-10 RX ADMIN — PHENYLEPHRINE HYDROCHLORIDE 100 MCG: 10 INJECTION INTRAVENOUS at 12:15

## 2022-02-10 RX ADMIN — METHOCARBAMOL TABLETS 750 MG: 750 TABLET, COATED ORAL at 01:39

## 2022-02-10 RX ADMIN — ACETAMINOPHEN 1000 MG: 500 TABLET ORAL at 05:23

## 2022-02-10 RX ADMIN — MIDAZOLAM HYDROCHLORIDE 2 MG: 2 INJECTION, SOLUTION INTRAMUSCULAR; INTRAVENOUS at 10:46

## 2022-02-10 RX ADMIN — ROCURONIUM BROMIDE 10 MG: 10 INJECTION INTRAVENOUS at 13:32

## 2022-02-10 RX ADMIN — BUPIVACAINE HYDROCHLORIDE 15 ML: 5 INJECTION, SOLUTION EPIDURAL; INTRACAUDAL at 11:01

## 2022-02-10 RX ADMIN — Medication 10 MG: at 12:15

## 2022-02-10 RX ADMIN — SODIUM CHLORIDE, PRESERVATIVE FREE 10 ML: 5 INJECTION INTRAVENOUS at 21:33

## 2022-02-10 RX ADMIN — OXYCODONE 5 MG: 5 TABLET ORAL at 01:39

## 2022-02-10 RX ADMIN — MIDAZOLAM HYDROCHLORIDE 2 MG: 1 INJECTION, SOLUTION INTRAMUSCULAR; INTRAVENOUS at 10:46

## 2022-02-10 RX ADMIN — INSULIN LISPRO 3 UNITS: 100 INJECTION, SOLUTION INTRAVENOUS; SUBCUTANEOUS at 21:41

## 2022-02-10 RX ADMIN — CEFAZOLIN SODIUM 2000 MG: 10 INJECTION, POWDER, FOR SOLUTION INTRAVENOUS at 19:53

## 2022-02-10 RX ADMIN — FENTANYL CITRATE 100 MCG: 50 INJECTION, SOLUTION INTRAMUSCULAR; INTRAVENOUS at 10:46

## 2022-02-10 RX ADMIN — SODIUM CHLORIDE, POTASSIUM CHLORIDE, SODIUM LACTATE AND CALCIUM CHLORIDE: 600; 310; 30; 20 INJECTION, SOLUTION INTRAVENOUS at 11:30

## 2022-02-10 RX ADMIN — GABAPENTIN 300 MG: 300 CAPSULE ORAL at 21:31

## 2022-02-10 RX ADMIN — GABAPENTIN 300 MG: 300 CAPSULE ORAL at 05:23

## 2022-02-10 RX ADMIN — Medication 10 MG: at 12:37

## 2022-02-10 RX ADMIN — SODIUM CHLORIDE, POTASSIUM CHLORIDE, SODIUM LACTATE AND CALCIUM CHLORIDE: 600; 310; 30; 20 INJECTION, SOLUTION INTRAVENOUS at 14:33

## 2022-02-10 RX ADMIN — METHOCARBAMOL TABLETS 750 MG: 750 TABLET, COATED ORAL at 19:46

## 2022-02-10 RX ADMIN — LIDOCAINE HYDROCHLORIDE 50 MG: 10 INJECTION, SOLUTION EPIDURAL; INFILTRATION; INTRACAUDAL; PERINEURAL at 11:59

## 2022-02-10 RX ADMIN — SODIUM CHLORIDE 9 ML: 9 INJECTION, SOLUTION INTRAMUSCULAR; INTRAVENOUS; SUBCUTANEOUS at 12:15

## 2022-02-10 RX ADMIN — INSULIN LISPRO 3 UNITS: 100 INJECTION, SOLUTION INTRAVENOUS; SUBCUTANEOUS at 05:33

## 2022-02-10 RX ADMIN — BUPIVACAINE HYDROCHLORIDE 25 ML: 5 INJECTION, SOLUTION EPIDURAL; INTRACAUDAL; PERINEURAL at 10:59

## 2022-02-10 RX ADMIN — SUGAMMADEX 200 MG: 100 INJECTION, SOLUTION INTRAVENOUS at 14:16

## 2022-02-10 RX ADMIN — CEFAZOLIN SODIUM 2000 MG: 2 SOLUTION INTRAVENOUS at 12:12

## 2022-02-10 RX ADMIN — PHENYLEPHRINE HYDROCHLORIDE 200 MCG: 10 INJECTION INTRAVENOUS at 12:23

## 2022-02-10 RX ADMIN — PHENYLEPHRINE HYDROCHLORIDE 100 MCG: 10 INJECTION INTRAVENOUS at 12:19

## 2022-02-10 RX ADMIN — ROCURONIUM BROMIDE 40 MG: 10 INJECTION INTRAVENOUS at 11:59

## 2022-02-10 RX ADMIN — PHENYLEPHRINE HYDROCHLORIDE 75 MCG/MIN: 10 INJECTION INTRAVENOUS at 12:38

## 2022-02-10 RX ADMIN — BUSPIRONE HYDROCHLORIDE 7.5 MG: 5 TABLET ORAL at 21:31

## 2022-02-10 RX ADMIN — ROCURONIUM BROMIDE 10 MG: 10 INJECTION INTRAVENOUS at 13:52

## 2022-02-10 RX ADMIN — ROCURONIUM BROMIDE 10 MG: 10 INJECTION INTRAVENOUS at 12:36

## 2022-02-10 RX ADMIN — INSULIN LISPRO 3 UNITS: 100 INJECTION, SOLUTION INTRAVENOUS; SUBCUTANEOUS at 01:39

## 2022-02-10 RX ADMIN — FENTANYL CITRATE 50 MCG: 50 INJECTION, SOLUTION INTRAMUSCULAR; INTRAVENOUS at 12:27

## 2022-02-10 RX ADMIN — FENTANYL CITRATE 50 MCG: 50 INJECTION, SOLUTION INTRAMUSCULAR; INTRAVENOUS at 11:59

## 2022-02-10 RX ADMIN — ROCURONIUM BROMIDE 20 MG: 10 INJECTION INTRAVENOUS at 12:51

## 2022-02-10 RX ADMIN — ACETAMINOPHEN 1000 MG: 500 TABLET ORAL at 21:30

## 2022-02-10 RX ADMIN — PROPOFOL 150 MG: 10 INJECTION, EMULSION INTRAVENOUS at 11:59

## 2022-02-10 ASSESSMENT — PULMONARY FUNCTION TESTS
PIF_VALUE: 17
PIF_VALUE: 27
PIF_VALUE: 0
PIF_VALUE: 26
PIF_VALUE: 11
PIF_VALUE: 26
PIF_VALUE: 27
PIF_VALUE: 27
PIF_VALUE: 21
PIF_VALUE: 26
PIF_VALUE: 27
PIF_VALUE: 27
PIF_VALUE: 26
PIF_VALUE: 26
PIF_VALUE: 1
PIF_VALUE: 27
PIF_VALUE: 26
PIF_VALUE: 27
PIF_VALUE: 26
PIF_VALUE: 27
PIF_VALUE: 25
PIF_VALUE: 26
PIF_VALUE: 27
PIF_VALUE: 26
PIF_VALUE: 28
PIF_VALUE: 26
PIF_VALUE: 27
PIF_VALUE: 26
PIF_VALUE: 26
PIF_VALUE: 27
PIF_VALUE: 26
PIF_VALUE: 6
PIF_VALUE: 26
PIF_VALUE: 26
PIF_VALUE: 19
PIF_VALUE: 26
PIF_VALUE: 31
PIF_VALUE: 27
PIF_VALUE: 31
PIF_VALUE: 10
PIF_VALUE: 27
PIF_VALUE: 26
PIF_VALUE: 25
PIF_VALUE: 27
PIF_VALUE: 26
PIF_VALUE: 27
PIF_VALUE: 1
PIF_VALUE: 21
PIF_VALUE: 27
PIF_VALUE: 26
PIF_VALUE: 26
PIF_VALUE: 16
PIF_VALUE: 27
PIF_VALUE: 26
PIF_VALUE: 26
PIF_VALUE: 25
PIF_VALUE: 27
PIF_VALUE: 26
PIF_VALUE: 26
PIF_VALUE: 27
PIF_VALUE: 22
PIF_VALUE: 27
PIF_VALUE: 26
PIF_VALUE: 25
PIF_VALUE: 26
PIF_VALUE: 27
PIF_VALUE: 7
PIF_VALUE: 26
PIF_VALUE: 26
PIF_VALUE: 25
PIF_VALUE: 27
PIF_VALUE: 26
PIF_VALUE: 24
PIF_VALUE: 27
PIF_VALUE: 26
PIF_VALUE: 15
PIF_VALUE: 28
PIF_VALUE: 27
PIF_VALUE: 25
PIF_VALUE: 22
PIF_VALUE: 27
PIF_VALUE: 26
PIF_VALUE: 23
PIF_VALUE: 26
PIF_VALUE: 25
PIF_VALUE: 26
PIF_VALUE: 4
PIF_VALUE: 22
PIF_VALUE: 26
PIF_VALUE: 27
PIF_VALUE: 31
PIF_VALUE: 25
PIF_VALUE: 26
PIF_VALUE: 27
PIF_VALUE: 26
PIF_VALUE: 2
PIF_VALUE: 26
PIF_VALUE: 25
PIF_VALUE: 26
PIF_VALUE: 26
PIF_VALUE: 27
PIF_VALUE: 26
PIF_VALUE: 27
PIF_VALUE: 26
PIF_VALUE: 27
PIF_VALUE: 27
PIF_VALUE: 26
PIF_VALUE: 27
PIF_VALUE: 27
PIF_VALUE: 26
PIF_VALUE: 27
PIF_VALUE: 27
PIF_VALUE: 5
PIF_VALUE: 27
PIF_VALUE: 26
PIF_VALUE: 26
PIF_VALUE: 0
PIF_VALUE: 26
PIF_VALUE: 2
PIF_VALUE: 26
PIF_VALUE: 26
PIF_VALUE: 25
PIF_VALUE: 27
PIF_VALUE: 26
PIF_VALUE: 27
PIF_VALUE: 26
PIF_VALUE: 27
PIF_VALUE: 25
PIF_VALUE: 26
PIF_VALUE: 2
PIF_VALUE: 26
PIF_VALUE: 25
PIF_VALUE: 26
PIF_VALUE: 26
PIF_VALUE: 28

## 2022-02-10 ASSESSMENT — PAIN SCALES - GENERAL
PAINLEVEL_OUTOF10: 8
PAINLEVEL_OUTOF10: 6
PAINLEVEL_OUTOF10: 0
PAINLEVEL_OUTOF10: 8
PAINLEVEL_OUTOF10: 0
PAINLEVEL_OUTOF10: 10
PAINLEVEL_OUTOF10: 0
PAINLEVEL_OUTOF10: 0
PAINLEVEL_OUTOF10: 10
PAINLEVEL_OUTOF10: 0

## 2022-02-10 ASSESSMENT — PAIN DESCRIPTION - ORIENTATION
ORIENTATION: LEFT;LOWER

## 2022-02-10 ASSESSMENT — PAIN DESCRIPTION - LOCATION
LOCATION: LEG

## 2022-02-10 ASSESSMENT — PAIN DESCRIPTION - DESCRIPTORS: DESCRIPTORS: ACHING;DISCOMFORT

## 2022-02-10 ASSESSMENT — PAIN DESCRIPTION - FREQUENCY: FREQUENCY: CONTINUOUS

## 2022-02-10 ASSESSMENT — PAIN DESCRIPTION - PAIN TYPE
TYPE: ACUTE PAIN

## 2022-02-10 ASSESSMENT — ENCOUNTER SYMPTOMS: TACHYPNEA: 1

## 2022-02-10 NOTE — ANESTHESIA PROCEDURE NOTES
Peripheral Block    Patient location during procedure: pre-op  Start time: 2/10/2022 10:48 AM  End time: 2/10/2022 10:56 AM  Staffing  Performed: anesthesiologist   Anesthesiologist: Rayray Adam MD  Preanesthetic Checklist  Completed: patient identified, IV checked, site marked, risks and benefits discussed, surgical consent, monitors and equipment checked, pre-op evaluation, timeout performed, anesthesia consent given, oxygen available and patient being monitored  Peripheral Block  Patient position: supine  Prep: ChloraPrep  Patient monitoring: cardiac monitor, continuous pulse ox, frequent blood pressure checks and IV access  Block type: Saphenous  Laterality: left  Injection technique: single-shot  Guidance: ultrasound guided  Local infiltration: lidocaine  Infiltration strength: 1 %  Dose: 3 mL  Provider prep: mask and sterile gloves  Local infiltration: lidocaine  Needle  Needle gauge: 21 G  Needle length: 10 cm  Needle localization: ultrasound guidance  Test dose: negative  Assessment  Injection assessment: negative aspiration for heme, no paresthesia on injection and local visualized surrounding nerve on ultrasound  Paresthesia pain: none  Slow fractionated injection: yes  Hemodynamics: stable  Additional Notes  U/S [id number].  (1) Under ultrasound guidance, a 21 gauge needle was inserted and placed in close proximity to the saphenous nerve.  (2) Ultrasound was also used to visualize the spread of the anesthetic in close proximity to the nerve being blocked. (3) The nerve appeared anatomically normal, and (4 there were no apparent abnormal pathological findings on the image that were readily visible and related to the nerve being blocked. (5) A permanent ultrasound image was saved in the patient's record.             Medications Administered  Bupivacaine (MARCAINE) PF injection 0.5%, 15 mL  Reason for block: post-op pain management and at surgeon's request

## 2022-02-10 NOTE — PROGRESS NOTES
Writer contacted attending group regarding patients constant pain. Explained that we had given everything she has ordered with out any improvement. Physician ordered 1 time dose of Fentanyl 50 mcg.

## 2022-02-10 NOTE — ANESTHESIA POSTPROCEDURE EVALUATION
Department of Anesthesiology  Postprocedure Note    Patient: Dee Powell  MRN: [id number]  YOB: 1945  Date of evaluation: 2/10/2022  Time:  4:35 PM     Procedure Summary     Date: 02/10/22 Room / Location: [address]    Anesthesia Start: 5619 Anesthesia Stop: 2393    Procedure: CLOSED REDUCTION LEFT PYLON APPLICATION OF RING EXTERNAL FIXATOR (Left Ankle) Diagnosis: (LEFT PILON FRACTURE)    Surgeons: Goran Soni DO Responsible Provider: Isabella Hurtado MD    Anesthesia Type: general ASA Status: 3          Anesthesia Type: general    Saad Phase I: Saad Score: 8    Saad Phase II:      Last vitals: Reviewed and per EMR flowsheets.        Anesthesia Post Evaluation    Patient location during evaluation: PACU  Patient participation: complete - patient participated  Level of consciousness: awake and alert  Pain score: 0  Nausea & Vomiting: no nausea  Cardiovascular status: hemodynamically stable and hypertensive  Respiratory status: room air

## 2022-02-10 NOTE — PROGRESS NOTES
PROGRESS NOTE          PATIENT NAME: Jaclyn N Diaz [facility] RECORD NO. [id number]  DATE: 2/10/2022  PRIMARY CARE PHYSICIAN: SHONA Kong - CNP    HD: # 1    ASSESSMENT    Patient Active Problem List   Diagnosis    Tibia/fibula fracture, shaft, unspecified laterality, closed, initial encounter       MEDICAL DECISION MAKING AND PLAN    Left distal tibia pilon fracture, left distal fibula fracture   -Plan for OR with Orthopedics for external fixation today at 11am   -NPO since midnight   -Multimodal pain management. Patient required 1 time does of fentanyl last night   -Cardiology consulted and cleared for surgery    CHF s/p stents in    -Cardiology consulted and managing    Diabetes   -Sliding scale insulin   -Last glucose 139    DVT Prophylaxis - hold for surgical intervention today, consider restarting POD#1      Chief Complaint: Involved in an [facility] Patient seen and examined. Complains of pain 8/10. No issue overnight per patient and nursing other than pain. Denies fever, HA, CP, SOB, N/V, dysuria. Has been tolerating oral intake. Urinating on her own. NPO since midnight      OBJECTIVE  VITALS: Temp: Temp: 99.1 °F (37.3 °C)Temp  Av.4 °F (36.9 °C)  Min: 97.3 °F (36.3 °C)  Max: 99.1 °F (82.2 °C) BP Systolic (19ILY), JGA:438 , Min:147 , XN   Diastolic (47BEG), SAL:87, Min:64, Max:99   Pulse Pulse  Av.7  Min: 69  Max: 84 Resp Resp  Av.8  Min: 12  Max: 20 Pulse ox SpO2  Av.8 %  Min: 93 %  Max: 96 %  Gen: NAD, cooperative     Cardiovascular: Regular rate     Respiratory: Chest symmetric, no accessory muscle use, normal respirations, no audible wheezes     MSK:  LLE: Splint on, c/d/i. Window exposing ankle demonstrated swelling but soft and compressible compartment. Able to wiggle exposed digits. Sensation intact to exposed digits.  BCR to exposed digits    ABDOMEN: Soft, non tender, non distended    EXTREMITY: Pulses 2+, BCR    I/O last 3 completed shifts:  In: -   Out: 500 [Urine:500]    Drain/tube output: In: -   Out: 500 [Urine:500]    LAB:  CBC:   Recent Labs     02/09/22  1006 02/10/22  0611   WBC 12.7* 11.1   HGB 14.4 12.8   HCT 47.0 40.5   MCV 84.7 84.4    315     BMP:   Recent Labs     02/09/22  1006 02/10/22  0611   * 134*   K 4.5 4.0   CL 98 102   CO2 21 21   BUN 15 17   CREATININE 0.94* 1.01*   GLUCOSE 192* 139*     COAGS:   Recent Labs     02/09/22  1006   APTT 23.8   INR 0.9       RADIOLOGY:  Films reviewed. None taken today.       Roselyn Chong DO  2/10/22, 8:06 AM

## 2022-02-10 NOTE — FLOWSHEET NOTE
Dr Maryellen Ulloa to the bedside, time out performed @ 1046, Pt monitored, 02, popiteal  and Saphenous nerve blocks completed using 0.5% Bupivacaine, 25 mL 0.5% Bupivacaine injected into popiteal site.  15 mL 0.5% Bupivacaine injected into saphenous site,  pt tolerated procedure well,  Site CDI, (see charting)  Versed Given: 2 mg  Fentanyl 100 mcg  Start time: 1046  End Time:  1052

## 2022-02-10 NOTE — CARE COORDINATION
SBIRT completed    Pt with negative alcohol/drug/depression screenings            Alcohol Screening and Brief Intervention        Recent Labs     02/09/22  1006   ALC <10       Alcohol Pre-screening     (WOMEN ONLY) How many times in the past year have you had 4 or more drinks in a day?: None    Alcohol Screening Audit       Drug Pre-Screening   How many times in the past year have you used a recreational drug or used a prescription medication for nonmedical reasons?: None    Drug Screening DAST       Mood Pre-Screening (PHQ-2)  During the past two weeks, have you been bothered by little interest or pleasure in doing things?: No  During the past two weeks, have you been bothered by feeling down, depressed, or hopeless?: No    Mood Pre-Screening (PHQ-9)         I have interviewed Kwaku George, [id number] regarding  Her alcohol consumption/drug use and risk for excessive use. Screenings were negative. Patient  N/A intervention at this time.    Deferred []    Completed on: 2/10/2022   JOEL Willis

## 2022-02-10 NOTE — ANESTHESIA PROCEDURE NOTES
Peripheral Block    Patient location during procedure: pre-op  Start time: 2/10/2022 10:48 AM  End time: 2/10/2022 10:56 AM  Staffing  Performed: anesthesiologist   Anesthesiologist: Yair Valdivia MD  Preanesthetic Checklist  Completed: patient identified, IV checked, site marked, risks and benefits discussed, surgical consent, monitors and equipment checked, pre-op evaluation, timeout performed, anesthesia consent given, oxygen available and patient being monitored  Peripheral Block  Patient position: supine  Prep: ChloraPrep  Patient monitoring: cardiac monitor, continuous pulse ox, frequent blood pressure checks and IV access  Block type: Sciatic  Laterality: left  Injection technique: single-shot  Guidance: ultrasound guided  Local infiltration: lidocaine  Infiltration strength: 1 %  Dose: 3 mL  Popliteal  Provider prep: mask and sterile gloves  Local infiltration: lidocaine  Needle  Needle gauge: 21 G  Needle length: 10 cm  Needle localization: ultrasound guidance  Test dose: negative  Assessment  Injection assessment: negative aspiration for heme, no paresthesia on injection and local visualized surrounding nerve on ultrasound  Paresthesia pain: none  Slow fractionated injection: yes  Hemodynamics: stable  Additional Notes  U/S 96131.  (1) Under ultrasound guidance, a 21 gauge needle was inserted and placed in close proximity to the popliteal nerve.  (2) Ultrasound was also used to visualize the spread of the anesthetic in close proximity to the nerve being blocked. (3) The nerve appeared anatomically normal, and (4 there were no apparent abnormal pathological findings on the image that were readily visible and related to the nerve being blocked. (5) A permanent ultrasound image was saved in the patient's record.             Medications Administered  Bupivacaine (MARCAINE) PF injection 0.5%, 25 mL  Reason for block: post-op pain management and at surgeon's request

## 2022-02-10 NOTE — PROGRESS NOTES
PHARMACY NOTE    Rachel Walker was ordered the oral bisphosphonate, alendronate. Oral Bisphosphonates have an increased risk of serious GI events if the strict dosing instructions are not followed. Per the FDA labeling, oral bisphosphonates must be taken at least 30 min (60 min for ibandronate) before the first food, beverage or medication of the day. Patients MUST also avoid lying down for at least 30 min (60 minutes for ibandronate) after taking the oral bisphosphonate. Because these strict dosage instructions are difficult to follow in many hospitalized patients, orders for oral bisphosphonate therapy will be discontinued per the [address] [place] [address]. Consider risk versus benefits when resuming the oral bisphosphonate at discharge. Rowena Huffman.  Sue Quan PharmD, BCPS  2/10/2022 5:41 PM

## 2022-02-10 NOTE — PROGRESS NOTES
[facility] ([facility])  Occupational Therapy Not Seen Note    DATE: 2/10/2022    NAME: Lin Quarles  MRN: [id number]   : 1945      Patient not seen this date for Occupational Therapy due to:    Surgery/Procedure: OR with ortho for ex fix LLE    Next Scheduled Treatment: 2022    Electronically signed by LISS Seaman on 2/10/2022 at 8:57 AM
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
## 2022-02-10 NOTE — BRIEF OP NOTE
Brief Postoperative Note      Patient: Francisco Dickerson  YOB: 1945  MRN: [id number]   CSN: [id number]     Date of Procedure: 2/10/2022    Pre-Op Diagnosis: LEFT PILON FRACTURE    Post-Op Diagnosis: LEFT PILON FRACTURE       Procedure(s):  Application of left ankle spanning circular external fixator; CPT 09630  Closed reduction of L pilon fx w/manipulation; CPT 51160    Surgeon(s):  Basia Grier DO    Assistant:  Resident: Cirilo Wiseman DO; Emely Lira MD    Anesthesia: General    Estimated Blood Loss (mL): 5 mL    IVF: 5577 mL    Complications: None. Specimens:   * No specimens in log *    Implants:  Synthes Max Frame:  180 mm full and 5/8 rings  180 mm foot plate  Fracture strut x 4 (2 med, 2 long)  HA coated nonself-drilling Schanz pins  1.8 mm wires  Implant Name Type Inv.  Item Serial No.  Lot No. LRB No. Used Action   SYNTHES MAXFRAME FULL RINGS ALUMMINUM 180MM REF [id number]      Left 1 Implanted   SYNTHES MAXFRAME 5/8 RINGS ALUMINUM 180MM REF [id number]      Left 1 Implanted   SYNTHES MAXFRAME FOOTPLATE LONG ALUMINUM 140SM REF [id number]      Left 1 Implanted   SYNTHES MAXFRAME POLYAXIAL STRUTS FINE AND GROSS ADJUST MEDIUM REF [id number]      Left 2 Implanted   SYNTHES RETENTION BANDS REF [id number]      Left 4 Implanted   SYNTHES THREADED MAGUI STANDARD  REF [id number]      Left 1 Implanted   SYNTHES 5.0 MM [facility] SCREW 35MM REF [id number]     [id number] Left 1 Implanted   SYNTHES 5.0MM BLUNTED POINT SCHANZ SCREW 30MM REF [id number]     [id number] Left 1 Implanted   SYNTHES MAXFRAME POLYAXIAL STRUTS LONG REF [id number]      Left 2 Implanted   SYNTHES WIRE POSTS REF [id number]      Left 3 Implanted   SYNTHES WIRE POSTS REF [id number]      Left 1 Implanted   SYNTHES STANDOFFS 40MM REF [id number]      Left 1 Implanted   SYNTHES STANDOFFS 80MM REF [id number]      Left 3 Implanted   SYNTHES BOLTS REF [id number]      Left 10 Implanted   SYNTHES CONNECTION BOLTS REF [id number]      Left 6 Implanted   SYNTHES MULTIPARALLEL PIN MOUNT POST REF [id number]      Left 1 Implanted   SYNTHES MULTIPARALLEL PIN MOUNT POST REF [id number]      Left 2 Implanted   SeeFuture MULTI PARALLEL PIN MOUNT BOLT REF [id number]      Left 3 Implanted   SeeFuture SMOOTH WIRES REF [id number]      Left 2 Implanted   SeeFuture SMOOTH WIRES REF [id number]      Left 3 Implanted   SeeFuture FOOT PLATE SUPPORT KIT REF [id number]      Left 1 Implanted         Drains:   External Urinary Catheter (Active)   Catheter changed  Yes 02/10/22 0551   Urine Color Yellow 02/10/22 0551   Urine Appearance Clear 02/10/22 0551   Output (mL) 500 mL 02/09/22 2130   Suction 40 mmgHg continuous 02/10/22 0551   Placement Replaced 02/10/22 0551   Skin Assessment No Injury 02/10/22 0551       Findings: See operative note for details    Electronically signed by Kaila Zamora DO on 2/10/2022 at 2:27 PM

## 2022-02-11 LAB
GLUCOSE BLD-MCNC: 109 MG/DL (ref 65–105)
GLUCOSE BLD-MCNC: 124 MG/DL (ref 65–105)
GLUCOSE BLD-MCNC: 155 MG/DL (ref 65–105)
GLUCOSE BLD-MCNC: 189 MG/DL (ref 65–105)
GLUCOSE BLD-MCNC: 225 MG/DL (ref 65–105)
GLUCOSE BLD-MCNC: 253 MG/DL (ref 65–105)

## 2022-02-11 PROCEDURE — 97535 SELF CARE MNGMENT TRAINING: CPT

## 2022-02-11 PROCEDURE — 2580000003 HC RX 258: Performed by: STUDENT IN AN ORGANIZED HEALTH CARE EDUCATION/TRAINING PROGRAM

## 2022-02-11 PROCEDURE — 6370000000 HC RX 637 (ALT 250 FOR IP): Performed by: STUDENT IN AN ORGANIZED HEALTH CARE EDUCATION/TRAINING PROGRAM

## 2022-02-11 PROCEDURE — 82947 ASSAY GLUCOSE BLOOD QUANT: CPT

## 2022-02-11 PROCEDURE — 2700000000 HC OXYGEN THERAPY PER DAY

## 2022-02-11 PROCEDURE — 97530 THERAPEUTIC ACTIVITIES: CPT

## 2022-02-11 PROCEDURE — 6360000002 HC RX W HCPCS: Performed by: STUDENT IN AN ORGANIZED HEALTH CARE EDUCATION/TRAINING PROGRAM

## 2022-02-11 PROCEDURE — 94761 N-INVAS EAR/PLS OXIMETRY MLT: CPT

## 2022-02-11 PROCEDURE — 97162 PT EVAL MOD COMPLEX 30 MIN: CPT

## 2022-02-11 PROCEDURE — 97166 OT EVAL MOD COMPLEX 45 MIN: CPT

## 2022-02-11 PROCEDURE — 6360000002 HC RX W HCPCS: Performed by: NURSE PRACTITIONER

## 2022-02-11 PROCEDURE — 2060000000 HC ICU INTERMEDIATE R&B

## 2022-02-11 RX ORDER — OXYCODONE HYDROCHLORIDE 5 MG/1
5 TABLET ORAL ONCE
Status: COMPLETED | OUTPATIENT
Start: 2022-02-11 | End: 2022-02-11

## 2022-02-11 RX ORDER — GABAPENTIN 300 MG/1
300 CAPSULE ORAL EVERY 8 HOURS
Qty: 21 CAPSULE | Refills: 0 | Status: SHIPPED | OUTPATIENT
Start: 2022-02-11 | End: 2022-02-22 | Stop reason: SDUPTHER

## 2022-02-11 RX ORDER — METHOCARBAMOL 750 MG/1
750 TABLET, FILM COATED ORAL EVERY 6 HOURS
Qty: 40 TABLET | Refills: 0 | Status: SHIPPED | OUTPATIENT
Start: 2022-02-11 | End: 2022-02-21

## 2022-02-11 RX ORDER — ACETAMINOPHEN 500 MG
1000 TABLET ORAL EVERY 8 HOURS SCHEDULED
Qty: 42 TABLET | Refills: 0 | Status: SHIPPED | OUTPATIENT
Start: 2022-02-11 | End: 2022-02-22 | Stop reason: SDUPTHER

## 2022-02-11 RX ORDER — OXYCODONE HYDROCHLORIDE 5 MG/1
5 TABLET ORAL EVERY 8 HOURS PRN
Qty: 10 TABLET | Refills: 0 | Status: SHIPPED | OUTPATIENT
Start: 2022-02-11 | End: 2022-02-18

## 2022-02-11 RX ADMIN — METHOCARBAMOL TABLETS 750 MG: 750 TABLET, COATED ORAL at 09:20

## 2022-02-11 RX ADMIN — ACETAMINOPHEN 1000 MG: 500 TABLET ORAL at 05:51

## 2022-02-11 RX ADMIN — POLYETHYLENE GLYCOL 3350 17 G: 17 POWDER, FOR SOLUTION ORAL at 09:20

## 2022-02-11 RX ADMIN — SODIUM CHLORIDE, PRESERVATIVE FREE 10 ML: 5 INJECTION INTRAVENOUS at 09:20

## 2022-02-11 RX ADMIN — INSULIN LISPRO 9 UNITS: 100 INJECTION, SOLUTION INTRAVENOUS; SUBCUTANEOUS at 20:30

## 2022-02-11 RX ADMIN — GABAPENTIN 300 MG: 300 CAPSULE ORAL at 20:26

## 2022-02-11 RX ADMIN — METHOCARBAMOL TABLETS 750 MG: 750 TABLET, COATED ORAL at 01:13

## 2022-02-11 RX ADMIN — ACETAMINOPHEN 1000 MG: 500 TABLET ORAL at 13:27

## 2022-02-11 RX ADMIN — GABAPENTIN 300 MG: 300 CAPSULE ORAL at 13:24

## 2022-02-11 RX ADMIN — OXYCODONE 5 MG: 5 TABLET ORAL at 13:27

## 2022-02-11 RX ADMIN — METHOCARBAMOL TABLETS 750 MG: 750 TABLET, COATED ORAL at 18:13

## 2022-02-11 RX ADMIN — CITALOPRAM 40 MG: 20 TABLET, FILM COATED ORAL at 09:20

## 2022-02-11 RX ADMIN — ENOXAPARIN SODIUM 30 MG: 100 INJECTION SUBCUTANEOUS at 20:27

## 2022-02-11 RX ADMIN — CEFAZOLIN SODIUM 2000 MG: 10 INJECTION, POWDER, FOR SOLUTION INTRAVENOUS at 05:55

## 2022-02-11 RX ADMIN — INSULIN LISPRO 6 UNITS: 100 INJECTION, SOLUTION INTRAVENOUS; SUBCUTANEOUS at 13:30

## 2022-02-11 RX ADMIN — INSULIN LISPRO 3 UNITS: 100 INJECTION, SOLUTION INTRAVENOUS; SUBCUTANEOUS at 01:15

## 2022-02-11 RX ADMIN — BUSPIRONE HYDROCHLORIDE 7.5 MG: 5 TABLET ORAL at 20:25

## 2022-02-11 RX ADMIN — OXYCODONE 5 MG: 5 TABLET ORAL at 22:33

## 2022-02-11 RX ADMIN — OXYCODONE 5 MG: 5 TABLET ORAL at 20:20

## 2022-02-11 RX ADMIN — INSULIN LISPRO 3 UNITS: 100 INJECTION, SOLUTION INTRAVENOUS; SUBCUTANEOUS at 18:04

## 2022-02-11 RX ADMIN — METHOCARBAMOL TABLETS 750 MG: 750 TABLET, COATED ORAL at 13:24

## 2022-02-11 RX ADMIN — GABAPENTIN 300 MG: 300 CAPSULE ORAL at 05:51

## 2022-02-11 RX ADMIN — ACETAMINOPHEN 1000 MG: 500 TABLET ORAL at 22:23

## 2022-02-11 RX ADMIN — SODIUM CHLORIDE, PRESERVATIVE FREE 10 ML: 5 INJECTION INTRAVENOUS at 20:30

## 2022-02-11 RX ADMIN — BUSPIRONE HYDROCHLORIDE 7.5 MG: 5 TABLET ORAL at 09:21

## 2022-02-11 ASSESSMENT — PAIN - FUNCTIONAL ASSESSMENT
PAIN_FUNCTIONAL_ASSESSMENT: PREVENTS OR INTERFERES SOME ACTIVE ACTIVITIES AND ADLS

## 2022-02-11 ASSESSMENT — PAIN DESCRIPTION - DESCRIPTORS
DESCRIPTORS: ACHING;PRESSURE
DESCRIPTORS: ACHING;DISCOMFORT
DESCRIPTORS: ACHING;PRESSURE
DESCRIPTORS: ACHING;DISCOMFORT
DESCRIPTORS: ACHING;PRESSURE

## 2022-02-11 ASSESSMENT — PAIN DESCRIPTION - LOCATION
LOCATION: LEG

## 2022-02-11 ASSESSMENT — PAIN DESCRIPTION - PAIN TYPE
TYPE: ACUTE PAIN;SURGICAL PAIN
TYPE: ACUTE PAIN
TYPE: ACUTE PAIN
TYPE: ACUTE PAIN;SURGICAL PAIN
TYPE: ACUTE PAIN
TYPE: ACUTE PAIN;SURGICAL PAIN

## 2022-02-11 ASSESSMENT — PAIN DESCRIPTION - ORIENTATION
ORIENTATION: LEFT;LOWER
ORIENTATION: LEFT
ORIENTATION: LEFT;LOWER

## 2022-02-11 ASSESSMENT — PAIN SCALES - GENERAL
PAINLEVEL_OUTOF10: 6
PAINLEVEL_OUTOF10: 8
PAINLEVEL_OUTOF10: 3
PAINLEVEL_OUTOF10: 7
PAINLEVEL_OUTOF10: 8
PAINLEVEL_OUTOF10: 6
PAINLEVEL_OUTOF10: 8
PAINLEVEL_OUTOF10: 7
PAINLEVEL_OUTOF10: 0
PAINLEVEL_OUTOF10: 6
PAINLEVEL_OUTOF10: 0

## 2022-02-11 ASSESSMENT — PAIN DESCRIPTION - ONSET
ONSET: GRADUAL
ONSET: GRADUAL

## 2022-02-11 ASSESSMENT — PAIN DESCRIPTION - FREQUENCY
FREQUENCY: INTERMITTENT
FREQUENCY: CONTINUOUS
FREQUENCY: CONTINUOUS
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
## 2022-02-11 NOTE — PROGRESS NOTES
CLINICAL PHARMACY NOTE: MEDS TO BEDS    Total # of Prescriptions Filled: 5   The following medications were delivered to the patient:  · Vitamin d 1.25mg capsules  · Tylenol 500mg tablets  · Robaxin 750mg tablets  · Gabapentin 300mg capsules  · Oxycodone 5mg tablet    Additional Documentation: meds delivered to the pt in room 419 on 02.11.22 at 14:41, co pay $14.33, paid with a card over the phone on the clover. No visitors in the room at the time of delivery.
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
## 2022-02-11 NOTE — PROGRESS NOTES
POST OP NOTE    SUBJECTIVE  Pt s/p CLOSED REDUCTION LEFT PILON   APPLICATION OF RING EXTERNAL FIXATOR. OBJECTIVE  VITALS:  BP (!) 161/80   Pulse 98   Temp 98.7 °F (37.1 °C) (Oral)   Resp 23   Ht 5' 3\" (1.6 m)   Wt 240 lb 4.8 oz (109 kg)   SpO2 94%   BMI 42.57 kg/m²         GENERAL:  awake and alert. No acute distress  CARDIOVASCULAR:  regular rate and rhythm   LUNGS:  CTA Bilaterally  ABDOMEN:   Abdomen soft, non-tender, non-distended  INCISION: Incision clean/dry/intact    ASSESSMENT  1.  POD# 0 s/p CLOSED REDUCTION LEFT PILON   APPLICATION OF RING EXTERNAL FIXATOR    PLAN  MMPT  F/u ortho [facility] DO  Trauma/Surgery Service  2/10/2022 at 10:10 PM
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
## 2022-02-11 NOTE — PROGRESS NOTES
Catherine Fenton was evaluated today and a DME order was entered for a standard wheelchair because she requires this to successfully complete daily living tasks of personal cares and ambulating. A standard manual wheelchair is necessary due to patient's impaired ambulation and mobility restrictions and would be unable to resolve these daily living tasks using a cane or walker. The patient is capable of using a standard wheelchair safely in their home and can maneuver within their home with adequate access. There is a caregiver available to provide necessary assistance. The need for this equipment was discussed with the patient and she understands, is in agreement, and has not expressed an unwillingness to use the wheelchair. Catherine Fenton requires elevating legrest due to significant edema of the lower extremities and requires elevation. Catherine Fenton was evaluated today and a DME order was entered for variable height hospital bed because she requires assistance for positioning needs not possible in an ordinary bed. Patient needs variability of bed height to perform patient transfers and for personal cares and hygiene. Current body Weight: 240 lb 4.8 oz (109 kg). The need for this equipment was discussed with the patient and she understands and is in agreement. Catherine Fenton was evaluated today and a DME order was entered for a shower/bath seat with a back because the patient requires this to successfully complete daily living tasks of bathing, grooming and hygiene. A shower/bath seat with a back is necessary due to the patient's unsteady gait, inability to stand unassisted in the shower/bath. The need for this equipment was discussed with the patient. They understand and are in agreement.

## 2022-02-12 VITALS
WEIGHT: 240.3 LBS | HEART RATE: 92 BPM | OXYGEN SATURATION: 97 % | RESPIRATION RATE: 18 BRPM | HEIGHT: 63 IN | DIASTOLIC BLOOD PRESSURE: 79 MMHG | TEMPERATURE: 98.4 F | BODY MASS INDEX: 42.58 KG/M2 | SYSTOLIC BLOOD PRESSURE: 143 MMHG

## 2022-02-12 LAB
GLUCOSE BLD-MCNC: 148 MG/DL (ref 65–105)
GLUCOSE BLD-MCNC: 172 MG/DL (ref 65–105)
GLUCOSE BLD-MCNC: 175 MG/DL (ref 65–105)
GLUCOSE BLD-MCNC: 238 MG/DL (ref 65–105)

## 2022-02-12 PROCEDURE — 2580000003 HC RX 258: Performed by: STUDENT IN AN ORGANIZED HEALTH CARE EDUCATION/TRAINING PROGRAM

## 2022-02-12 PROCEDURE — 94664 DEMO&/EVAL PT USE INHALER: CPT

## 2022-02-12 PROCEDURE — 6370000000 HC RX 637 (ALT 250 FOR IP): Performed by: STUDENT IN AN ORGANIZED HEALTH CARE EDUCATION/TRAINING PROGRAM

## 2022-02-12 PROCEDURE — 82947 ASSAY GLUCOSE BLOOD QUANT: CPT

## 2022-02-12 PROCEDURE — 6360000002 HC RX W HCPCS: Performed by: NURSE PRACTITIONER

## 2022-02-12 PROCEDURE — 2700000000 HC OXYGEN THERAPY PER DAY

## 2022-02-12 PROCEDURE — 94761 N-INVAS EAR/PLS OXIMETRY MLT: CPT

## 2022-02-12 PROCEDURE — 97530 THERAPEUTIC ACTIVITIES: CPT

## 2022-02-12 RX ORDER — OXYCODONE HYDROCHLORIDE 5 MG/1
5 TABLET ORAL EVERY 6 HOURS PRN
Status: DISCONTINUED | OUTPATIENT
Start: 2022-02-12 | End: 2022-02-12 | Stop reason: HOSPADM

## 2022-02-12 RX ORDER — OXYCODONE HYDROCHLORIDE 5 MG/1
10 TABLET ORAL EVERY 4 HOURS PRN
Status: DISCONTINUED | OUTPATIENT
Start: 2022-02-12 | End: 2022-02-12

## 2022-02-12 RX ADMIN — BUSPIRONE HYDROCHLORIDE 7.5 MG: 5 TABLET ORAL at 08:57

## 2022-02-12 RX ADMIN — OXYCODONE 5 MG: 5 TABLET ORAL at 13:16

## 2022-02-12 RX ADMIN — METHOCARBAMOL TABLETS 750 MG: 750 TABLET, COATED ORAL at 07:04

## 2022-02-12 RX ADMIN — METHOCARBAMOL TABLETS 750 MG: 750 TABLET, COATED ORAL at 01:53

## 2022-02-12 RX ADMIN — INSULIN LISPRO 3 UNITS: 100 INJECTION, SOLUTION INTRAVENOUS; SUBCUTANEOUS at 01:54

## 2022-02-12 RX ADMIN — ACETAMINOPHEN 1000 MG: 500 TABLET ORAL at 05:46

## 2022-02-12 RX ADMIN — POLYETHYLENE GLYCOL 3350 17 G: 17 POWDER, FOR SOLUTION ORAL at 09:02

## 2022-02-12 RX ADMIN — INSULIN LISPRO 3 UNITS: 100 INJECTION, SOLUTION INTRAVENOUS; SUBCUTANEOUS at 09:05

## 2022-02-12 RX ADMIN — SODIUM CHLORIDE, PRESERVATIVE FREE 10 ML: 5 INJECTION INTRAVENOUS at 09:06

## 2022-02-12 RX ADMIN — METHOCARBAMOL TABLETS 750 MG: 750 TABLET, COATED ORAL at 13:16

## 2022-02-12 RX ADMIN — CITALOPRAM 40 MG: 20 TABLET, FILM COATED ORAL at 08:57

## 2022-02-12 RX ADMIN — ACETAMINOPHEN 1000 MG: 500 TABLET ORAL at 13:16

## 2022-02-12 RX ADMIN — GABAPENTIN 300 MG: 300 CAPSULE ORAL at 05:47

## 2022-02-12 RX ADMIN — ENOXAPARIN SODIUM 30 MG: 100 INJECTION SUBCUTANEOUS at 08:58

## 2022-02-12 RX ADMIN — INSULIN LISPRO 6 UNITS: 100 INJECTION, SOLUTION INTRAVENOUS; SUBCUTANEOUS at 13:23

## 2022-02-12 RX ADMIN — OXYCODONE 5 MG: 5 TABLET ORAL at 05:34

## 2022-02-12 RX ADMIN — GABAPENTIN 300 MG: 300 CAPSULE ORAL at 13:16

## 2022-02-12 ASSESSMENT — PAIN DESCRIPTION - PAIN TYPE
TYPE: ACUTE PAIN;SURGICAL PAIN
TYPE: ACUTE PAIN

## 2022-02-12 ASSESSMENT — PAIN DESCRIPTION - LOCATION
LOCATION: LEG
LOCATION: LEG

## 2022-02-12 ASSESSMENT — PAIN - FUNCTIONAL ASSESSMENT: PAIN_FUNCTIONAL_ASSESSMENT: PREVENTS OR INTERFERES SOME ACTIVE ACTIVITIES AND ADLS

## 2022-02-12 ASSESSMENT — PAIN SCALES - GENERAL
PAINLEVEL_OUTOF10: 9
PAINLEVEL_OUTOF10: 3
PAINLEVEL_OUTOF10: 8
PAINLEVEL_OUTOF10: 8
PAINLEVEL_OUTOF10: 4

## 2022-02-12 ASSESSMENT — PAIN DESCRIPTION - DESCRIPTORS: DESCRIPTORS: ACHING;PRESSURE

## 2022-02-12 ASSESSMENT — PAIN DESCRIPTION - ORIENTATION: ORIENTATION: LEFT

## 2022-02-12 ASSESSMENT — PAIN DESCRIPTION - FREQUENCY: FREQUENCY: CONTINUOUS
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
## 2022-02-12 NOTE — PROGRESS NOTES
Verbal and written discharge instructions given. IV and telemetry monitor removed. Prescriptions given to patient. Patient accompanied to transport vehicle via wheelchair. Patient in stable condition, discharged home with home heatlh.

## 2022-02-12 NOTE — PLAN OF CARE
Problem: Falls - Risk of:  Goal: Will remain free from falls  Description: Will remain free from falls  2/12/2022 1049 by Shireen Sandy RN  Outcome: Ongoing  2/11/2022 2323 by Kevin Shafer RN  Outcome: Ongoing  Goal: Absence of physical injury  Description: Absence of physical injury  2/12/2022 1049 by Shireen Sandy RN  Outcome: Ongoing  2/11/2022 2323 by Kevin Shafer RN  Outcome: Ongoing     Problem: Pain:  Goal: Pain level will decrease  Description: Pain level will decrease  2/12/2022 1049 by Shireen Sandy RN  Outcome: Ongoing  2/11/2022 2323 by Kevin Shafer RN  Outcome: Ongoing  Goal: Control of acute pain  Description: Control of acute pain  2/12/2022 1049 by Shireen Sandy RN  Outcome: Ongoing  2/11/2022 2323 by Kevin Shafer RN  Outcome: Ongoing  Goal: Control of chronic pain  Description: Control of chronic pain  2/12/2022 1049 by Shireen Sandy RN  Outcome: Ongoing  2/11/2022 2323 by Kevin Shafer RN  Outcome: Ongoing     Problem: Skin Integrity:  Goal: Will show no infection signs and symptoms  Description: Will show no infection signs and symptoms  2/12/2022 1049 by Shireen Sandy RN  Outcome: Ongoing  2/11/2022 2323 by Kevin Shafer RN  Outcome: Ongoing  Goal: Absence of new skin breakdown  Description: Absence of new skin breakdown  2/12/2022 1049 by Shireen Sandy RN  Outcome: Ongoing  2/11/2022 2323 by Kevin Shafer RN  Outcome: Ongoing

## 2022-02-12 NOTE — PLAN OF CARE
Problem: Falls - Risk of:  Goal: Will remain free from falls  Description: Will remain free from falls  2/11/2022 2323 by Kory Bridges RN  Outcome: Ongoing     Problem: Falls - Risk of:  Goal: Absence of physical injury  Description: Absence of physical injury  2/11/2022 2323 by Kory Bridges RN  Outcome: Ongoing     Problem: Pain:  Goal: Pain level will decrease  Description: Pain level will decrease  2/11/2022 2323 by Kory Bridges RN  Outcome: Ongoing     Problem: Pain:  Goal: Control of acute pain  Description: Control of acute pain  2/11/2022 2323 by Kory Bridges RN  Outcome: Ongoing     Problem: Pain:  Goal: Control of chronic pain  Description: Control of chronic pain  2/11/2022 2323 by Kory Bridges RN  Outcome: Ongoing     Problem: Skin Integrity:  Goal: Will show no infection signs and symptoms  Description: Will show no infection signs and symptoms  2/11/2022 2323 by Kory Bridges RN  Outcome: Ongoing     Problem: Skin Integrity:  Goal: Absence of new skin breakdown  Description: Absence of new skin breakdown  2/11/2022 2323 by Kory Bridges RN  Outcome: Ongoing
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
## 2022-02-14 NOTE — DISCHARGE SUMMARY
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   2/14/2022, 8:39 AM  Time Spent for discharge: <30 minutes        Attending Note      I have reviewed the above TECJENNIFER note(s) and I either performed the key elements of the medical history and physical exam or was present with the resident when the key elements of the medical history and physical exam were performed. I have discussed the findings, established the care plan and recommendations with Resident, TECSS RN, bedside nurse.     Baldo Soulier, MD  2/16/2022  11:03 AM

## 2022-02-22 ENCOUNTER — TELEPHONE (OUTPATIENT)
Dept: ORTHOPEDIC SURGERY | Age: 77
End: 2022-02-22

## 2022-02-22 ENCOUNTER — OFFICE VISIT (OUTPATIENT)
Dept: ORTHOPEDIC SURGERY | Age: 77
End: 2022-02-22

## 2022-02-22 DIAGNOSIS — S82.872A CLOSED DISPLACED PILON FRACTURE OF LEFT TIBIA, INITIAL ENCOUNTER: Primary | ICD-10-CM

## 2022-02-22 PROCEDURE — 99024 POSTOP FOLLOW-UP VISIT: CPT | Performed by: ORTHOPAEDIC SURGERY

## 2022-02-22 RX ORDER — BACLOFEN 10 MG/1
5 TABLET ORAL NIGHTLY PRN
Qty: 15 TABLET | Refills: 0 | Status: ON HOLD | OUTPATIENT
Start: 2022-02-22 | End: 2022-03-02 | Stop reason: HOSPADM

## 2022-02-22 RX ORDER — CEPHALEXIN 500 MG/1
500 CAPSULE ORAL 4 TIMES DAILY
Qty: 28 CAPSULE | Refills: 3 | Status: SHIPPED | OUTPATIENT
Start: 2022-02-22 | End: 2022-03-01

## 2022-02-22 RX ORDER — GABAPENTIN 300 MG/1
300 CAPSULE ORAL EVERY 8 HOURS
Qty: 21 CAPSULE | Refills: 0 | Status: ON HOLD | OUTPATIENT
Start: 2022-02-22 | End: 2022-03-08

## 2022-02-22 RX ORDER — ACETAMINOPHEN 500 MG
1000 TABLET ORAL EVERY 8 HOURS SCHEDULED
Qty: 42 TABLET | Refills: 0 | Status: SHIPPED | OUTPATIENT
Start: 2022-02-22 | End: 2022-03-15 | Stop reason: ALTCHOICE

## 2022-02-22 NOTE — PROGRESS NOTES
[facility]  [id number] [facility]  Dept Phone: [phone]  Dept Fax: [phone]      Orthopaedic Trauma Clinic Follow Up      Subjective:   Date of Surgery: 2/10/22    Lin Quarles is a 77y.o. year old female who presents to the clinic today for routine follow up 12 days postop from application of left ankle spanning circular external fixator. They have been changing the dressings about the pin sites proximately every other day using a sterile wound cleaning solution obtain from their local pharmacy as well as petroleum gauze followed by regular gauze and tape. The deny any increase in drainage, increasing redness or pain about the pin sites, and no pus-like drainage. She endorses occasionally increased tenderness and swelling, but regularly ices and elevates the leg to deal with this which she feels is handling it appropriately. Denies any changes in sensation to the left lower extremity compared to previous visits. Review of Systems  Gen: no fever, chills, malaise  CV: no chest pain or palpitations  Resp: no cough or shortness of breath  GI: no nausea, vomiting, diarrhea, or constipation  Neuro: no seizures, vertigo, or headache  Msk: See HPI  10 remaining systems reviewed and negative    Objective : There were no vitals filed for this visit. There is no height or weight on file to calculate BMI. General: No acute distress, resting comfortably in the clinic  Neuro: alert. oriented  Eyes: Extra-ocular muscles intact  Pulm: Respirations unlabored and regular. Skin: warm, well perfused  Psych:   Patient has good fund of knowledge and displays understanding of exam, diagnosis, and plan. MSK:  Left lower extremity: Circular frame well-maintained, and in appropriate position without circular stress resting on skin. Dressings about pin sites are clean dry and intact. There 24 hours old. Dressings elevated to examine pin-skin interface.  No overt signs of infection, but some pins demonstrate slight erythema about pin sites with scant drainage at the middle ring medial pin sites. Pictured below. Remaining pin sites appear clean and non-erythematous without active drainage. Patient has intact sensation to light touch throughout her calf and foot, although slightly decreased sensation about her toes globally. Motor examination limited by hardware placement, although she is able to flex and extend the toes. 2+ DP pulse. Radiology:  History: Left pilon fracture status post circular frame placement    Comparison: 2/10/2022    Findings: 3 views of the left ankle (AP, lateral, mortise) in a skeletally mature patient showing well-maintained hardware without any signs of failure or impending failure, including radiolucency about the pins or pin breakage. Overall fracture alignment is well-maintained compared to postoperative films. Still has significant articular depression of the pilon. Also some displacement of the medial malleolus, although unchanged compared to postoperative films. Significantly comminuted distal fibula fracture as well. Impression: Comminuted pilon fracture and distal fibula     Assessment:   77y.o. year old female with left pilon fx s/p circular frame application  Plan:   -1 week Rx of keflex provided to pharmacy as prophylaxis against overt pin site infection.  Bactrim was preferred, but patient has sulfa allergy  -Refills of keflex given, patient instructed to call in future if patient has signs/symptoms of pin-site infection  -Future operative interventions discussed, including amputation, acute fusion, subacute fusion, subacute ORIF, and fusion after subacute ORIF.  -Skin remains too swollen for any operative intervention at this time  -Refills provided for tylenol, muscle relaxants, and gabapentin  -F/u in 1 week with repeat x-rays and to monitor pin sites    Follow up:Return in about 1 week (around 3/1/2022) for follow-up with x-rays. No orders of the defined types were placed in this encounter. Orders Placed This Encounter   Procedures    XR ANKLE LEFT (MIN 3 VIEWS)     Standing Status:   Future     Number of Occurrences:   1     Standing Expiration Date:   2/22/2023     Order Specific Question:   Reason for exam:     Answer:   fx       Electronically signed by Ja Nettles MD on 2/22/2022 at 12:08 PM    This note is created with the assistance of a speech recognition program.  While intending to generate a document that actually reflects the content of the visit, the document can still have some errors including those of syntax and sound a like substitutions which may escape proof reading.   In such instances, actual meaning can be extrapolated by contextual diversion

## 2022-02-22 NOTE — TELEPHONE ENCOUNTER
02/10 - CLOSED REDUCTION LEFT PILON   APPLICATION OF RING EXTERNAL FIXATOR      Patient was seen in office this morning for a post op appt, she is having a lot of swelling but forgot to ask do you want her to continue physical therapy or wait for swelling to go down?      Please call patient with direction  Thank you d

## 2022-02-25 ENCOUNTER — TELEPHONE (OUTPATIENT)
Dept: ORTHOPEDIC SURGERY | Age: 77
End: 2022-02-25

## 2022-02-25 NOTE — TELEPHONE ENCOUNTER
Patient called. States fluid in her foot is pooling in her foot and is very painful. No drainage. No fever. Tried to het her in the office today, but she is too far. Please advise.

## 2022-02-25 NOTE — TELEPHONE ENCOUNTER
Patt Ramachandran, Dr Basilia Lui spoke with Ronaldo Barth to call patient in regards to this issue so just updating you :)  Said to reinforce ice and elevation and to make sure she picked up her antibiotic.

## 2022-02-25 NOTE — TELEPHONE ENCOUNTER
Spoke with daughter. She states the patient is taking her antibiotics as prescribed. Reinforced ice and elevation. Daughter states understanding.

## 2022-02-28 DIAGNOSIS — S82.872A CLOSED DISPLACED PILON FRACTURE OF LEFT TIBIA, INITIAL ENCOUNTER: Primary | ICD-10-CM

## 2022-03-01 ENCOUNTER — OFFICE VISIT (OUTPATIENT)
Dept: ORTHOPEDIC SURGERY | Age: 77
End: 2022-03-01

## 2022-03-01 VITALS — BODY MASS INDEX: 42.52 KG/M2 | WEIGHT: 240 LBS | HEIGHT: 63 IN

## 2022-03-01 DIAGNOSIS — S82.872G CLOSED DISPLACED PILON FRACTURE OF LEFT TIBIA WITH DELAYED HEALING, SUBSEQUENT ENCOUNTER: Primary | ICD-10-CM

## 2022-03-01 PROCEDURE — 99024 POSTOP FOLLOW-UP VISIT: CPT | Performed by: ORTHOPAEDIC SURGERY

## 2022-03-01 NOTE — PROGRESS NOTES
[facility]  [id number] [facility]  Dept Phone: [phone]  Dept Fax: [phone]      Orthopaedic Trauma Clinic Follow Up      Subjective:   Date of Surgery: 2/10/2022    Ashkan Lima is a 77y.o. year old female who presents to the clinic today for routine follow up for a swelling check, 19 days status post closed reduction of left pilon fracture and application of left ankle spanning circular external fixator. Patient states over the last week she has had increased swelling and pain. She continues to take her antibiotic and perform pin site care twice daily but has noticed increased redness and pain from her proximal pin sites. States she doesn't notice much drainage but when she changes the dressings in the morning there is a small amount on the old dressing. Patient states she has a history of cellulitis and MRSA on her legs in the past. She continues to get sharp shooting pains in her ankle and foot. Denies any calf tenderness, increased warmth of her leg and denies shortness of breath. States she has been icing and elevating but the second she stops, the leg swells right back up. Review of Systems  Gen: no fever, chills, malaise  CV: no chest pain or palpitations  Resp: no cough or shortness of breath  GI: no nausea, vomiting, diarrhea, or constipation  Neuro: no seizures, vertigo, or headache  Msk: left leg pain and swelling, possible pin site infection   10 remaining systems reviewed and negative    Objective : There were no vitals filed for this visit. Body mass index is 42.51 kg/m². General: No acute distress, resting comfortably in the clinic  Neuro: alert. oriented  Eyes: Extra-ocular muscles intact  Pulm: Respirations unlabored and regular. Skin: warm, well perfused  Psych:   Patient has good fund of knowledge and displays understanding of exam, diagnosis, and plan.   LLE:  Circular external fixator frame in good condition and in appropriate position off of the skin. Dry dressings around the pin sites clean, dry and without drainage. Proximal pin sites demonstrate evidence of cellulitis-redness, swelling, warmth. No drainage able to manually express from sites. Distal pin sites about the foot without erythema or drainage. Diffuse swelling throughout the leg but slightly improved from last week. Able to flex and extend toes. Sensation intact to light touch throughout the leg. +2 DP pulse. Toes warm, well perfused with BCR. Radiology:  History: Left pilon fracture status post circular frame placement    Comparison: 2/22/2022    Findings: 3 views of the left ankle (AP, oblique, lateral) in a skeletally mature patient showing maintained external fixator device without any signs of failure. The fracture has maintained alignment compared to previous films. Impression: Comminuted left pilon fracture and distal fibula with external fixator in stable alignment. Assessment:   77y.o. year old female with a left pilon and distal fibula fracture s/p closed reduction and circular external fixator application on 7/78/8143. Plan:   - Due to concern for infection, despite being on prophylactic antibiotics, discussion was had to go back to the OR tomorrow for pin site exchange. - Consent was obtained in the office today. Patient agreeable to plan of care. Follow up:No follow-ups on file. No orders of the defined types were placed in this encounter. No orders of the defined types were placed in this encounter. Electronically signed by SHONA Roca CNP on 3/1/2022 at 8:46 PM    This note is created with the assistance of a speech recognition program.  While intending to generate a document that actually reflects the content of the visit, the document can still have some errors including those of syntax and sound a like substitutions which may escape proof reading.   In such instances, actual meaning can be extrapolated by contextual diversion

## 2022-03-02 ENCOUNTER — ANESTHESIA (OUTPATIENT)
Dept: OPERATING ROOM | Age: 77
End: 2022-03-02
Payer: COMMERCIAL

## 2022-03-02 ENCOUNTER — APPOINTMENT (OUTPATIENT)
Dept: GENERAL RADIOLOGY | Age: 77
End: 2022-03-02
Attending: ORTHOPAEDIC SURGERY
Payer: COMMERCIAL

## 2022-03-02 ENCOUNTER — ANESTHESIA EVENT (OUTPATIENT)
Dept: OPERATING ROOM | Age: 77
End: 2022-03-02
Payer: COMMERCIAL

## 2022-03-02 ENCOUNTER — HOSPITAL ENCOUNTER (OUTPATIENT)
Age: 77
Setting detail: OUTPATIENT SURGERY
Discharge: HOME | End: 2022-03-02
Attending: ORTHOPAEDIC SURGERY | Admitting: ORTHOPAEDIC SURGERY
Payer: COMMERCIAL

## 2022-03-02 VITALS
SYSTOLIC BLOOD PRESSURE: 137 MMHG | RESPIRATION RATE: 20 BRPM | HEART RATE: 86 BPM | HEIGHT: 63 IN | BODY MASS INDEX: 42.52 KG/M2 | TEMPERATURE: 97.7 F | OXYGEN SATURATION: 95 % | WEIGHT: 240 LBS | DIASTOLIC BLOOD PRESSURE: 73 MMHG

## 2022-03-02 VITALS — SYSTOLIC BLOOD PRESSURE: 156 MMHG | OXYGEN SATURATION: 98 % | TEMPERATURE: 95 F | DIASTOLIC BLOOD PRESSURE: 101 MMHG

## 2022-03-02 DIAGNOSIS — G89.18 POST-OP PAIN: Primary | ICD-10-CM

## 2022-03-02 DIAGNOSIS — S82.872A CLOSED DISPLACED PILON FRACTURE OF LEFT TIBIA, INITIAL ENCOUNTER: Primary | ICD-10-CM

## 2022-03-02 LAB
GFR NON-AFRICAN AMERICAN: >60 ML/MIN
GFR SERPL CREATININE-BSD FRML MDRD: >60 ML/MIN
GFR SERPL CREATININE-BSD FRML MDRD: NORMAL ML/MIN/{1.73_M2}
GLUCOSE BLD-MCNC: 153 MG/DL (ref 65–105)
GLUCOSE BLD-MCNC: 163 MG/DL (ref 74–100)
POC BUN: 21 MG/DL (ref 8–26)
POC CHLORIDE: 103 MMOL/L (ref 98–107)
POC CREATININE: 0.87 MG/DL (ref 0.51–1.19)
POC HEMATOCRIT: 40 % (ref 36–46)
POC HEMOGLOBIN: 13.8 G/DL (ref 12–16)
POC IONIZED CALCIUM: 1.26 MMOL/L (ref 1.15–1.33)
POC POTASSIUM: 5 MMOL/L (ref 3.5–4.5)
POC SODIUM: 141 MMOL/L (ref 138–146)
SARS-COV-2, RAPID: NOT DETECTED
SPECIMEN DESCRIPTION: NORMAL

## 2022-03-02 PROCEDURE — 7100000040 HC SPAR PHASE II RECOVERY - FIRST 15 MIN: Performed by: ORTHOPAEDIC SURGERY

## 2022-03-02 PROCEDURE — 3600000004 HC SURGERY LEVEL 4 BASE: Performed by: ORTHOPAEDIC SURGERY

## 2022-03-02 PROCEDURE — 84295 ASSAY OF SERUM SODIUM: CPT

## 2022-03-02 PROCEDURE — 2709999900 HC NON-CHARGEABLE SUPPLY: Performed by: ORTHOPAEDIC SURGERY

## 2022-03-02 PROCEDURE — 2580000003 HC RX 258: Performed by: NURSE ANESTHETIST, CERTIFIED REGISTERED

## 2022-03-02 PROCEDURE — 20693 ADJMT/REVJ EXT FIXJ SYS ANES: CPT | Performed by: ORTHOPAEDIC SURGERY

## 2022-03-02 PROCEDURE — 7100000001 HC PACU RECOVERY - ADDTL 15 MIN: Performed by: ORTHOPAEDIC SURGERY

## 2022-03-02 PROCEDURE — 2500000003 HC RX 250 WO HCPCS: Performed by: ANESTHESIOLOGY

## 2022-03-02 PROCEDURE — 82565 ASSAY OF CREATININE: CPT

## 2022-03-02 PROCEDURE — 3209999900 FLUORO FOR SURGICAL PROCEDURES

## 2022-03-02 PROCEDURE — 6360000002 HC RX W HCPCS: Performed by: NURSE ANESTHETIST, CERTIFIED REGISTERED

## 2022-03-02 PROCEDURE — 82435 ASSAY OF BLOOD CHLORIDE: CPT

## 2022-03-02 PROCEDURE — 87635 SARS-COV-2 COVID-19 AMP PRB: CPT

## 2022-03-02 PROCEDURE — 85014 HEMATOCRIT: CPT

## 2022-03-02 PROCEDURE — 7100000000 HC PACU RECOVERY - FIRST 15 MIN: Performed by: ORTHOPAEDIC SURGERY

## 2022-03-02 PROCEDURE — 84132 ASSAY OF SERUM POTASSIUM: CPT

## 2022-03-02 PROCEDURE — 82947 ASSAY GLUCOSE BLOOD QUANT: CPT

## 2022-03-02 PROCEDURE — 64447 NJX AA&/STRD FEMORAL NRV IMG: CPT | Performed by: ANESTHESIOLOGY

## 2022-03-02 PROCEDURE — 2580000003 HC RX 258: Performed by: ORTHOPAEDIC SURGERY

## 2022-03-02 PROCEDURE — 64445 NJX AA&/STRD SCIATIC NRV IMG: CPT | Performed by: ANESTHESIOLOGY

## 2022-03-02 PROCEDURE — 6360000002 HC RX W HCPCS: Performed by: ANESTHESIOLOGY

## 2022-03-02 PROCEDURE — 82330 ASSAY OF CALCIUM: CPT

## 2022-03-02 PROCEDURE — 3600000014 HC SURGERY LEVEL 4 ADDTL 15MIN: Performed by: ORTHOPAEDIC SURGERY

## 2022-03-02 PROCEDURE — 84520 ASSAY OF UREA NITROGEN: CPT

## 2022-03-02 PROCEDURE — 2500000003 HC RX 250 WO HCPCS: Performed by: NURSE ANESTHETIST, CERTIFIED REGISTERED

## 2022-03-02 PROCEDURE — 2720000010 HC SURG SUPPLY STERILE: Performed by: ORTHOPAEDIC SURGERY

## 2022-03-02 PROCEDURE — 7100000041 HC SPAR PHASE II RECOVERY - ADDTL 15 MIN: Performed by: ORTHOPAEDIC SURGERY

## 2022-03-02 PROCEDURE — 3700000001 HC ADD 15 MINUTES (ANESTHESIA): Performed by: ORTHOPAEDIC SURGERY

## 2022-03-02 PROCEDURE — 3700000000 HC ANESTHESIA ATTENDED CARE: Performed by: ORTHOPAEDIC SURGERY

## 2022-03-02 RX ORDER — GABAPENTIN 300 MG/1
300 CAPSULE ORAL EVERY 8 HOURS
Qty: 21 CAPSULE | Refills: 0 | Status: SHIPPED | OUTPATIENT
Start: 2022-03-02 | End: 2022-03-08 | Stop reason: SDUPTHER

## 2022-03-02 RX ORDER — ESOMEPRAZOLE MAGNESIUM 40 MG/1
40 CAPSULE, DELAYED RELEASE ORAL
COMMUNITY

## 2022-03-02 RX ORDER — MIDAZOLAM HYDROCHLORIDE 2 MG/2ML
2 INJECTION, SOLUTION INTRAMUSCULAR; INTRAVENOUS ONCE
Status: COMPLETED | OUTPATIENT
Start: 2022-03-02 | End: 2022-03-02

## 2022-03-02 RX ORDER — LIDOCAINE HYDROCHLORIDE 10 MG/ML
INJECTION, SOLUTION EPIDURAL; INFILTRATION; INTRACAUDAL; PERINEURAL PRN
Status: DISCONTINUED | OUTPATIENT
Start: 2022-03-02 | End: 2022-03-02 | Stop reason: SDUPTHER

## 2022-03-02 RX ORDER — SODIUM CHLORIDE 0.9 % (FLUSH) 0.9 %
5-40 SYRINGE (ML) INJECTION EVERY 12 HOURS SCHEDULED
Status: DISCONTINUED | OUTPATIENT
Start: 2022-03-02 | End: 2022-03-02 | Stop reason: HOSPADM

## 2022-03-02 RX ORDER — KETAMINE HCL IN NACL, ISO-OSM 100MG/10ML
SYRINGE (ML) INJECTION PRN
Status: DISCONTINUED | OUTPATIENT
Start: 2022-03-02 | End: 2022-03-02 | Stop reason: SDUPTHER

## 2022-03-02 RX ORDER — SODIUM CHLORIDE, SODIUM LACTATE, POTASSIUM CHLORIDE, CALCIUM CHLORIDE 600; 310; 30; 20 MG/100ML; MG/100ML; MG/100ML; MG/100ML
INJECTION, SOLUTION INTRAVENOUS CONTINUOUS PRN
Status: DISCONTINUED | OUTPATIENT
Start: 2022-03-02 | End: 2022-03-02 | Stop reason: SDUPTHER

## 2022-03-02 RX ORDER — BUPIVACAINE HYDROCHLORIDE 5 MG/ML
INJECTION, SOLUTION EPIDURAL; INTRACAUDAL
Status: COMPLETED | OUTPATIENT
Start: 2022-03-02 | End: 2022-03-02

## 2022-03-02 RX ORDER — PROPOFOL 10 MG/ML
INJECTION, EMULSION INTRAVENOUS PRN
Status: DISCONTINUED | OUTPATIENT
Start: 2022-03-02 | End: 2022-03-02 | Stop reason: SDUPTHER

## 2022-03-02 RX ORDER — SODIUM CHLORIDE 0.9 % (FLUSH) 0.9 %
5-40 SYRINGE (ML) INJECTION PRN
Status: DISCONTINUED | OUTPATIENT
Start: 2022-03-02 | End: 2022-03-02 | Stop reason: HOSPADM

## 2022-03-02 RX ORDER — PROPOFOL 10 MG/ML
INJECTION, EMULSION INTRAVENOUS CONTINUOUS PRN
Status: DISCONTINUED | OUTPATIENT
Start: 2022-03-02 | End: 2022-03-02 | Stop reason: SDUPTHER

## 2022-03-02 RX ORDER — METHOCARBAMOL 750 MG/1
750 TABLET, FILM COATED ORAL 4 TIMES DAILY
Qty: 40 TABLET | Refills: 0 | Status: SHIPPED | OUTPATIENT
Start: 2022-03-02 | End: 2022-03-08 | Stop reason: SDUPTHER

## 2022-03-02 RX ORDER — CEFAZOLIN SODIUM 2 G/50ML
SOLUTION INTRAVENOUS PRN
Status: DISCONTINUED | OUTPATIENT
Start: 2022-03-02 | End: 2022-03-02 | Stop reason: SDUPTHER

## 2022-03-02 RX ORDER — FENTANYL CITRATE 50 UG/ML
100 INJECTION, SOLUTION INTRAMUSCULAR; INTRAVENOUS ONCE
Status: COMPLETED | OUTPATIENT
Start: 2022-03-02 | End: 2022-03-02

## 2022-03-02 RX ORDER — SODIUM CHLORIDE 9 MG/ML
25 INJECTION, SOLUTION INTRAVENOUS PRN
Status: DISCONTINUED | OUTPATIENT
Start: 2022-03-02 | End: 2022-03-02 | Stop reason: HOSPADM

## 2022-03-02 RX ORDER — MAGNESIUM HYDROXIDE 1200 MG/15ML
LIQUID ORAL CONTINUOUS PRN
Status: COMPLETED | OUTPATIENT
Start: 2022-03-02 | End: 2022-03-02

## 2022-03-02 RX ORDER — SULFAMETHOXAZOLE AND TRIMETHOPRIM 800; 160 MG/1; MG/1
1 TABLET ORAL 2 TIMES DAILY
Qty: 28 TABLET | Refills: 0 | Status: ON HOLD | OUTPATIENT
Start: 2022-03-02 | End: 2022-03-17 | Stop reason: HOSPADM

## 2022-03-02 RX ORDER — HYDROCODONE BITARTRATE AND ACETAMINOPHEN 5; 325 MG/1; MG/1
1 TABLET ORAL EVERY 6 HOURS PRN
Qty: 28 TABLET | Refills: 0 | Status: SHIPPED | OUTPATIENT
Start: 2022-03-02 | End: 2022-03-08 | Stop reason: SDUPTHER

## 2022-03-02 RX ADMIN — PROPOFOL 20 MG: 10 INJECTION, EMULSION INTRAVENOUS at 07:23

## 2022-03-02 RX ADMIN — Medication 10 MG: at 07:22

## 2022-03-02 RX ADMIN — MIDAZOLAM HYDROCHLORIDE 1 MG: 1 INJECTION, SOLUTION INTRAMUSCULAR; INTRAVENOUS at 07:11

## 2022-03-02 RX ADMIN — LIDOCAINE HYDROCHLORIDE 50 MG: 10 INJECTION, SOLUTION EPIDURAL; INFILTRATION; INTRACAUDAL at 07:22

## 2022-03-02 RX ADMIN — BUPIVACAINE HYDROCHLORIDE 15 ML: 5 INJECTION, SOLUTION EPIDURAL; INTRACAUDAL at 07:19

## 2022-03-02 RX ADMIN — SODIUM CHLORIDE, POTASSIUM CHLORIDE, SODIUM LACTATE AND CALCIUM CHLORIDE: 600; 310; 30; 20 INJECTION, SOLUTION INTRAVENOUS at 07:17

## 2022-03-02 RX ADMIN — Medication 10 MG: at 07:47

## 2022-03-02 RX ADMIN — CEFAZOLIN SODIUM 2000 MG: 2 SOLUTION INTRAVENOUS at 07:30

## 2022-03-02 RX ADMIN — Medication 10 MG: at 08:25

## 2022-03-02 RX ADMIN — PROPOFOL 50 MCG/KG/MIN: 10 INJECTION, EMULSION INTRAVENOUS at 07:22

## 2022-03-02 RX ADMIN — FENTANYL CITRATE 100 MCG: 50 INJECTION, SOLUTION INTRAMUSCULAR; INTRAVENOUS at 07:06

## 2022-03-02 RX ADMIN — BUPIVACAINE HYDROCHLORIDE 25 ML: 5 INJECTION, SOLUTION EPIDURAL; INTRACAUDAL; PERINEURAL at 07:17

## 2022-03-02 RX ADMIN — PROPOFOL 20 MG: 10 INJECTION, EMULSION INTRAVENOUS at 07:25

## 2022-03-02 ASSESSMENT — PULMONARY FUNCTION TESTS
PIF_VALUE: 1

## 2022-03-02 ASSESSMENT — PAIN SCALES - GENERAL
PAINLEVEL_OUTOF10: 0
PAINLEVEL_OUTOF10: 0
PAINLEVEL_OUTOF10: 9
PAINLEVEL_OUTOF10: 0

## 2022-03-02 ASSESSMENT — PAIN - FUNCTIONAL ASSESSMENT: PAIN_FUNCTIONAL_ASSESSMENT: 0-10

## 2022-03-02 NOTE — BRIEF OP NOTE
Brief Postoperative Note      Patient: Subhash Martinez  YOB: 1945  MRN: [id number]   [facility]: [id number]     Date of Procedure: 3/2/2022    Pre-Op Diagnosis: LEFT LEG EXTERNAL FIXATOR PIN SITE INFECTION    Post-Op Diagnosis: Same       Procedure(s):  REVISION OF LEFT ANKLE SPANNING EXTERNAL FIXATOR; 36524    Surgeon(s):  Raina Lal DO    Assistant:  Resident: Ferdinand Davenport DO; Harvey Martins DPM    Anesthesia: Monitor Anesthesia Care    Estimated Blood Loss (mL): minimal    IVF: see anesthesia records    Complications: None    Specimens:   * No specimens in log *    Implants:  Synthes 5.0 mm HA coated half pins      Drains:   [REMOVED] External Urinary Catheter (Removed)   Catheter changed  Yes 02/10/22 0551   Urine Color Yellow 02/10/22 0551   Urine Appearance Clear 02/10/22 0551   Output (mL) 500 mL 02/09/22 2130   Suction 40 mmgHg continuous 02/10/22 0551   Placement Replaced 02/10/22 0551   Skin Assessment No Injury 02/10/22 0551       Findings: pin site infectin/irritation    Electronically signed by Raina Lal DO on 3/2/2022 at 4:10 PM
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
## 2022-03-02 NOTE — ANESTHESIA PROCEDURE NOTES
Peripheral Block    Patient location during procedure: pre-op  Start time: 3/2/2022 7:05 AM  End time: 3/2/2022 7:09 AM  Staffing  Performed: anesthesiologist   Anesthesiologist: Nikki Marquez MD  Preanesthetic Checklist  Completed: patient identified, IV checked, site marked, risks and benefits discussed, surgical consent, monitors and equipment checked, pre-op evaluation, timeout performed, anesthesia consent given, oxygen available and patient being monitored  Peripheral Block  Patient position: supine  Prep: ChloraPrep  Patient monitoring: cardiac monitor, continuous pulse ox, frequent blood pressure checks and IV access  Block type: Sciatic  Laterality: left  Injection technique: single-shot  Guidance: ultrasound guided  Local infiltration: lidocaine  Infiltration strength: 1 %  Dose: 3 mL  Popliteal  Provider prep: mask and sterile gloves  Local infiltration: lidocaine  Needle  Needle type: Tuohy   Needle gauge: 18 G  Needle length: 10 cm  Needle localization: ultrasound guidance  Catheter type: open end  Catheter size: 20 G  Test dose: negative  Assessment  Injection assessment: negative aspiration for heme, no paresthesia on injection and local visualized surrounding nerve on ultrasound  Paresthesia pain: none  Slow fractionated injection: yes  Hemodynamics: stable  Additional Notes  U/S 65651.  (1) Under ultrasound guidance, a 21 gauge needle was inserted and placed in close proximity to the popliteal nerve.  (2) Ultrasound was also used to visualize the spread of the anesthetic in close proximity to the nerve being blocked. (3) The nerve appeared anatomically normal, and (4 there were no apparent abnormal pathological findings on the image that were readily visible and related to the nerve being blocked. (5) A permanent ultrasound image was saved in the patient's record.           Medications Administered  Bupivacaine (MARCAINE) PF injection 0.5%, 25 mL  Reason for block: post-op pain management and at surgeon's request
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
## 2022-03-02 NOTE — BRIEF OP NOTE
Brief Postoperative Note      Patient: Ángela Sargent  YOB: 1945  MRN: [id number]    Date of Procedure: 3/2/2022    Pre-Op Diagnosis: LEFT ANKLE SPANNING [place] PIN SITE INFECTION    Post-Op Diagnosis: Same       Procedure(s):  REVISION OF LEFT ANKLE EXTERNAL FIXATOR ( SYNTHES, PRE OP NERVE BLOCK, 3080, SUPINE, C ARM, SYNTHES MAX FRAME)  DEBRIDEMENT/IRRIGATION OF PIN SITES    Surgeon(s):  Jed Paul DO    Assistant:  Resident: Merlyn Coker DO; Jayden Yates DPM    Anesthesia: Monitor Anesthesia Care    Estimated Blood Loss (mL): Minimal    Complications: None    Specimens:   * No specimens in log *    Implants:  * No implants in log *      Drains:   [REMOVED] External Urinary Catheter (Removed)   Catheter changed  Yes 02/10/22 0551   Urine Color Yellow 02/10/22 0551   Urine Appearance Clear 02/10/22 0551   Output (mL) 500 mL 02/09/22 2130   Suction 40 mmgHg continuous 02/10/22 0551   Placement Replaced 02/10/22 0551   Skin Assessment No Injury 02/10/22 0551       Findings: Cellulitic pin sites    Electronically signed by Merlyn Coker DO on 3/2/2022 at 4:10 PM

## 2022-03-02 NOTE — ANESTHESIA PROCEDURE NOTES
Peripheral Block    Patient location during procedure: pre-op  Start time: 3/2/2022 7:05 AM  End time: 3/2/2022 7:09 AM  Staffing  Performed: anesthesiologist   Anesthesiologist: Guerline Art MD  Preanesthetic Checklist  Completed: patient identified, IV checked, site marked, risks and benefits discussed, surgical consent, monitors and equipment checked, pre-op evaluation, timeout performed, anesthesia consent given, oxygen available and patient being monitored  Peripheral Block  Patient position: supine  Prep: ChloraPrep  Patient monitoring: cardiac monitor, continuous pulse ox, frequent blood pressure checks and IV access  Block type: Saphenous  Laterality: left  Injection technique: single-shot  Guidance: ultrasound guided  Local infiltration: lidocaine  Infiltration strength: 1 %  Dose: 3 mL  Provider prep: mask and sterile gloves  Local infiltration: lidocaine  Needle  Needle gauge: 21 G  Needle length: 10 cm  Needle localization: ultrasound guidance  Test dose: negative  Assessment  Injection assessment: negative aspiration for heme, no paresthesia on injection and local visualized surrounding nerve on ultrasound  Paresthesia pain: none  Slow fractionated injection: yes  Hemodynamics: stable  Additional Notes  U/S 51531.  (1) Under ultrasound guidance, a 21 gauge needle was inserted and placed in close proximity to the saphenous nerve.  (2) Ultrasound was also used to visualize the spread of the anesthetic in close proximity to the nerve being blocked. (3) The nerve appeared anatomically normal, and (4 there were no apparent abnormal pathological findings on the image that were readily visible and related to the nerve being blocked. (5) A permanent ultrasound image was saved in the patient's record.             Medications Administered  Bupivacaine (MARCAINE) PF injection 0.5%, 15 mL  Reason for block: post-op pain management and at surgeon's request

## 2022-03-02 NOTE — FLOWSHEET NOTE
Dr Lelo Siddiqui to the bedside, time out performed @ 21 , Pt monitored, 02, popiteal  and Saphenous nerve blocks completed using 0.5% Bupivacaine, 25 mL 0.5% Bupivacaine injected into popiteal site.  15 mL 0.5% Bupivacaine injected into saphenous site,  pt tolerated procedure well,  Site CDI, (see charting)  Versed Given: 1 mg  Fentanyl 100 mcg  Start time: 0440  End Time:  3155

## 2022-03-03 NOTE — OP NOTE
[address]                  [facility] [address]                                OPERATIVE REPORT    PATIENT NAME: Ofelia Grande                      :        1945  MED REC NO:   [id number]                             ROOM:  ACCOUNT NO:   [de-identified]                           ADMIT DATE: 2022  PROVIDER:     Zbigniew Velazquez    DATE OF PROCEDURE:  2022    PREOPERATIVE DIAGNOSIS:  Left ankle-spanning external fixator pin site  infection. POSTOPERATIVE DIAGNOSIS:  Left ankle-spanning external fixator pin site  infection. PROCEDURE:  Revision of left ankle-spanning external fixator, CPT 60019. ATTENDING SURGEON:  Zbigniew Velazquez DO    ASSISTANTS:  1.  Vida Yeboah DO, PGY-3  2.  Jae Manuel DPM    ANESTHESIA:  Monitored anesthesia care. ESTIMATED BLOOD LOSS:  Minimal.    IV FLUIDS:  See anesthesia records. COMPLICATIONS:  None. SPECIMENS:  None. IMPLANTS:  Synthes 6.0-mm HA coated half pins x3. INDICATIONS:  The patient is a 78-year-old female with a severe left  pilon fracture that was initially treated with a circular external  fixator while her soft tissues recovered. Her swelling continues to  improve, although not ready for definitive fixation. She developed  erythema and drainage around several of her pin sites in the tibia that  did not resolve with oral antibiotics. Therefore, we recommended pin  removal and placement of new pins. She understood the risks, benefits,  and alternative and agreed to proceed. Written informed consent was  obtained and the operative site was marked. DESCRIPTION OF PROCEDURE:  The patient was transported to the operating  room. Monitored anesthesia care was administered by the anesthesia  providers without complication. She was transferred to a  cantilever-type OR table in a supine position. All bony prominences  were well padded, and she was adequately secured to the bed. Left lower  extremity was isolated, scrubbed with Hibiclens followed by alcohol and  prepped and draped in the usual sterile fashion. Time-out was performed  that included all involved parties in correctly identifying the patient,  planned procedure and operative site. After everyone agreed, we  continued. The pin that had the most erythema and swelling surrounding  it was the thin wire and the more distal of the proximal ring block. There was also erythema and swelling, although no drainage around the  half pin on the same ring; however, due to their close approximation and  soft tissue irritation versus surinder infection, we felt that both pins  needed to be removed. We began by placing a HA coated Schanz pin on the  medial surface of the tibia angled distally. Radiographs were taken to  assure this did not enter the fracture site. We predrilled with  continuous irrigation and then placed the HA coated half pin  bicortically. All nuts were tightened with the post and pin gentile. We  then placed an additional HA coated Schanz pin in an anterior to  posterior direction just proximal to the ring using the same techniques,  proximally around the 5/8 ring on the proximal aspect of the ring block. This pin had very slight erythema; however, the skin around it was  mobile, was not edematous and we were able to express no form of  drainage from this. Therefore, we elected to leave this pin; however,  we did place an additional point of fixation more proximally in an  anteromedial to posterior lateral fashion. Radiographs were used to  assure that the tibial component from a total knee was not interfered  with. This was also an HA coated pin, was placed bicortically. Again,  all nuts were tightened on the post and pin gentile. With the three new  pins in place, the half pin and thin wire from the full ring on the  proximal tibial block were then removed in their entirety without  complication.   The pin sites were then thoroughly curetted down to the  bone. We were able to find no evidence of deep abscess or overt signs  of infection other than the erythema and swelling. We felt we had  maintained stability after removing the most irritating pins. We  copiously irrigated the pin sites, cleaned the leg, nonadhesive  dressings were placed around all pin sites. We placed sterile sponges  between the skin and the rings in order to stabilize the skin. The  patient was transported to recovery room by the anesthesia providers  without complication. POSTOPERATIVE PLAN:  The patient will trial a course of Bactrim for oral  antibiotics. The Keflex did not eradicate the infection and was chosen  initially because of her reported history of vomiting with numerous  antibiotics. We instruct her to trial of Bactrim for a few days, and if  she has no significant symptoms, to continue this due to her history of  MRSA. Richardson Hilt Pain medication refills were provided. The patient will remain  nonweightbearing. She is instructed to maintain strict ice and elevated  position. We will revaluate in the clinic in 1 week and if the pin  sites are healing appropriately and the swelling has dissipated, we will  plan to proceed with definitive fixation.         Marcelle Nguyen    D: 03/02/2022 17:06:00       T: 03/02/2022 17:11:17     [email or web address]  Job#: [id number]     Doc#: [id number]    CC:

## 2022-03-04 NOTE — ANESTHESIA POSTPROCEDURE EVALUATION
POST- ANESTHESIA EVALUATION       Pt Name: Alex Oliveira  MRN: [id number]  [place]: 1945  Date of evaluation: 3/4/2022  Time:  6:16 AM      /73   Pulse 86   Temp 97.7 °F (36.5 °C)   Resp 20   Ht 5' 3\" (1.6 m)   Wt 240 lb (108.9 kg)   SpO2 95%   BMI 42.51 kg/m²      Consciousness Level  Awake  Cardiopulmonary Status  Stable  Pain Adequately Treated YES  Nausea / Vomiting  NO  Adequate Hydration  YES  Anesthesia Related Complications NONE      Electronically signed by Justin Carter MD on 3/4/2022 at 6:16 AM       Department of Anesthesiology  Postprocedure Note    Patient: Alex Oliveira  MRN: [id number]  YOB: 1945  Date of evaluation: 3/4/2022  Time:  6:16 AM     Procedure Summary     Date: 03/02/22 Room / Location: [address]    Anesthesia Start: 3177 Anesthesia Stop: 1519    Procedure: REVISION OF LEFT ANKLE EXTERNAL FIXATOR ( SYNTHES, PRE OP NERVE BLOCK, 3080, SUPINE, C ARM, SYNTHES MAX FRAME) (Left ) Diagnosis: (LEFT ANKLE EXTERNAL FIXATOR PIN SITE INFECTION)    Surgeons: Evangelina Marcial DO Responsible Provider: Ernesto Correa MD    Anesthesia Type: MAC, regional ASA Status: 3          Anesthesia Type: MAC, regional    Saad Phase I: Saad Score: 9    Saad Phase II: Saad Score: 10    Last vitals: Reviewed and per EMR flowsheets.        Anesthesia Post Evaluation

## 2022-03-08 ENCOUNTER — OFFICE VISIT (OUTPATIENT)
Dept: ORTHOPEDIC SURGERY | Age: 77
End: 2022-03-08

## 2022-03-08 VITALS — HEIGHT: 63 IN | WEIGHT: 240 LBS | BODY MASS INDEX: 42.52 KG/M2

## 2022-03-08 DIAGNOSIS — S82.872G CLOSED DISPLACED PILON FRACTURE OF LEFT TIBIA WITH DELAYED HEALING, SUBSEQUENT ENCOUNTER: Primary | ICD-10-CM

## 2022-03-08 PROCEDURE — 99024 POSTOP FOLLOW-UP VISIT: CPT | Performed by: ORTHOPAEDIC SURGERY

## 2022-03-08 RX ORDER — GABAPENTIN 300 MG/1
300 CAPSULE ORAL EVERY 8 HOURS
Qty: 21 CAPSULE | Refills: 0 | Status: ON HOLD | OUTPATIENT
Start: 2022-03-08 | End: 2022-03-17 | Stop reason: HOSPADM

## 2022-03-08 RX ORDER — METHOCARBAMOL 750 MG/1
750 TABLET, FILM COATED ORAL 4 TIMES DAILY
Qty: 40 TABLET | Refills: 0 | Status: SHIPPED | OUTPATIENT
Start: 2022-03-08 | End: 2022-03-18

## 2022-03-08 RX ORDER — HYDROCODONE BITARTRATE AND ACETAMINOPHEN 5; 325 MG/1; MG/1
1 TABLET ORAL EVERY 6 HOURS PRN
Qty: 28 TABLET | Refills: 0 | Status: ON HOLD | OUTPATIENT
Start: 2022-03-08 | End: 2022-03-17 | Stop reason: HOSPADM

## 2022-03-08 NOTE — PROGRESS NOTES
[facility]  [id number] [facility]  Dept Phone: [phone]  Dept Fax: [phone]      [facility] Follow Up      Subjective:   Date of Surgery: 3/2/22, 2/10/22    Kate Bo is a 77y.o. year old female who presents to the clinic today for routine wound/swelling check of left ankle. Pt with left pilon ankle fracture s/p ex fix 2/10/22. S/p revision ex fix placement due to pin site infection on 3/2/22. Pt doing well today. Pin sites all are without signs of infection. Patient is completing her antibiotic course. Patient denies any fevers or chills. Patient has been nonweightbearing to left lower extremity. Patient denies further complaints    Review of Systems  Gen: no fever, chills, malaise  CV: no chest pain or palpitations  Resp: no cough or shortness of breath  GI: no nausea, vomiting, diarrhea, or constipation  Neuro: no seizures, vertigo, or headache  Msk: No pain to left ankle  10 remaining systems reviewed and negative    Objective : There were no vitals filed for this visit. Body mass index is 42.51 kg/m². General: No acute distress, resting comfortably in the clinic  Neuro: alert. oriented  Eyes: Extra-ocular muscles intact  Pulm: Respirations unlabored and regular. Skin: warm, well perfused  Psych:   Patient has good fund of knowledge and displays understanding of exam, diagnosis, and plan. MSK: Circular exfix in place to left ankle. Notable swelling to the left ankle with overlying skin unable to be wrinkled. Mild tenderness palpation about the ankle. All pin sites throughout the left ankle and lower leg are without signs of infection. Compartments soft. 2+ DP pulse. TA/EHL/FHL/GS motor intact. Deep and Superficial Peroneal/Saphenous/Sural SILT.       Radiology:  None     Assessment:   77y.o. year old female who sustained left pilon fracture status post circular exfix on 2/10/2022, status post exfix revision for pin site infection on 3/2/2022  Plan:       Pin sites are without signs of infection. Patient will continue to complete her antibiotic course. Patient will continue to wash with soap and water to pin sites. Patient states that her swelling to the left ankle is very much different at home and that she can wrinkle the skin about the ankle. However, the ankle notably swells while driving 1 hour to clinic. Therefore we recommend patient come to clinic 1 week from today for swelling check. If swelling is improved we will admit the patient overnight and continue ice and elevation for definitive surgery on Wednesday morning. Patient demonstrates understanding. All questions and concerns addressed. Instructed patient to continue stretching her toes to prevent stiffness. Flexeril, gabapentin, Norco scripts refilled and sent to pharmacy. Follow up:No follow-ups on file. Orders Placed This Encounter   Medications    gabapentin (NEURONTIN) 300 MG capsule     Sig: Take 1 capsule by mouth every 8 hours for 7 days. Dispense:  21 capsule     Refill:  0    HYDROcodone-acetaminophen (NORCO) 5-325 MG per tablet     Sig: Take 1 tablet by mouth every 6 hours as needed for Pain for up to 7 days. Intended supply: 7 days. Take lowest dose possible to manage pain     Dispense:  28 tablet     Refill:  0     Reduce doses taken as pain becomes manageable    methocarbamol (ROBAXIN-750) 750 MG tablet     Sig: Take 1 tablet by mouth 4 times daily for 10 days     Dispense:  40 tablet     Refill:  0          No orders of the defined types were placed in this encounter. Electronically signed by Jake Lancaster DO on 3/8/2022 at 4:36 PM    This note is created with the assistance of a speech recognition program.  While intending to generate a document that actually reflects the content of the visit, the document can still have some errors including those of syntax and sound a like substitutions which may escape proof reading. In such instances, actual meaning can be extrapolated by contextual diversion

## 2022-03-15 ENCOUNTER — OFFICE VISIT (OUTPATIENT)
Dept: ORTHOPEDIC SURGERY | Age: 77
End: 2022-03-15

## 2022-03-15 ENCOUNTER — HOSPITAL ENCOUNTER (INPATIENT)
Age: 77
LOS: 3 days | Discharge: HOME | DRG: 493 | End: 2022-03-18
Attending: ORTHOPAEDIC SURGERY | Admitting: ORTHOPAEDIC SURGERY
Payer: COMMERCIAL

## 2022-03-15 ENCOUNTER — ANESTHESIA EVENT (OUTPATIENT)
Dept: OPERATING ROOM | Age: 77
DRG: 493 | End: 2022-03-15
Payer: COMMERCIAL

## 2022-03-15 VITALS — BODY MASS INDEX: 40.75 KG/M2 | HEIGHT: 63 IN | WEIGHT: 230 LBS

## 2022-03-15 DIAGNOSIS — G89.18 POST-OP PAIN: ICD-10-CM

## 2022-03-15 DIAGNOSIS — S82.892S CLOSED LEFT ANKLE FRACTURE, SEQUELA: Primary | ICD-10-CM

## 2022-03-15 DIAGNOSIS — S82.872D CLOSED DISPLACED PILON FRACTURE OF LEFT TIBIA WITH ROUTINE HEALING, SUBSEQUENT ENCOUNTER: Primary | ICD-10-CM

## 2022-03-15 PROBLEM — S82.892A CLOSED LEFT ANKLE FRACTURE, INITIAL ENCOUNTER: Status: ACTIVE | Noted: 2022-03-15

## 2022-03-15 LAB
SARS-COV-2, RAPID: NOT DETECTED
SPECIMEN DESCRIPTION: NORMAL

## 2022-03-15 PROCEDURE — 6370000000 HC RX 637 (ALT 250 FOR IP): Performed by: STUDENT IN AN ORGANIZED HEALTH CARE EDUCATION/TRAINING PROGRAM

## 2022-03-15 PROCEDURE — 87635 SARS-COV-2 COVID-19 AMP PRB: CPT

## 2022-03-15 PROCEDURE — 1200000000 HC SEMI PRIVATE

## 2022-03-15 PROCEDURE — 99024 POSTOP FOLLOW-UP VISIT: CPT | Performed by: ORTHOPAEDIC SURGERY

## 2022-03-15 RX ORDER — GABAPENTIN 300 MG/1
300 CAPSULE ORAL EVERY 8 HOURS
Status: DISCONTINUED | OUTPATIENT
Start: 2022-03-15 | End: 2022-03-18 | Stop reason: HOSPADM

## 2022-03-15 RX ORDER — METHOCARBAMOL 750 MG/1
750 TABLET, FILM COATED ORAL 4 TIMES DAILY
Status: DISCONTINUED | OUTPATIENT
Start: 2022-03-15 | End: 2022-03-18 | Stop reason: HOSPADM

## 2022-03-15 RX ORDER — PANTOPRAZOLE SODIUM 40 MG/1
40 TABLET, DELAYED RELEASE ORAL
Status: DISCONTINUED | OUTPATIENT
Start: 2022-03-16 | End: 2022-03-18 | Stop reason: HOSPADM

## 2022-03-15 RX ORDER — SODIUM CHLORIDE 9 MG/ML
25 INJECTION, SOLUTION INTRAVENOUS PRN
Status: DISCONTINUED | OUTPATIENT
Start: 2022-03-15 | End: 2022-03-18 | Stop reason: HOSPADM

## 2022-03-15 RX ORDER — BRIMONIDINE TARTRATE 2 MG/ML
1 SOLUTION/ DROPS OPHTHALMIC 2 TIMES DAILY
Status: DISCONTINUED | OUTPATIENT
Start: 2022-03-15 | End: 2022-03-18 | Stop reason: HOSPADM

## 2022-03-15 RX ORDER — DICYCLOMINE HYDROCHLORIDE 10 MG/1
10 CAPSULE ORAL
Status: DISCONTINUED | OUTPATIENT
Start: 2022-03-15 | End: 2022-03-18 | Stop reason: HOSPADM

## 2022-03-15 RX ORDER — SODIUM CHLORIDE 0.9 % (FLUSH) 0.9 %
5-40 SYRINGE (ML) INJECTION PRN
Status: DISCONTINUED | OUTPATIENT
Start: 2022-03-15 | End: 2022-03-18 | Stop reason: HOSPADM

## 2022-03-15 RX ORDER — BUSPIRONE HYDROCHLORIDE 5 MG/1
7.5 TABLET ORAL 2 TIMES DAILY
Status: DISCONTINUED | OUTPATIENT
Start: 2022-03-15 | End: 2022-03-18 | Stop reason: HOSPADM

## 2022-03-15 RX ORDER — SULFAMETHOXAZOLE AND TRIMETHOPRIM 800; 160 MG/1; MG/1
1 TABLET ORAL 2 TIMES DAILY
Status: DISCONTINUED | OUTPATIENT
Start: 2022-03-15 | End: 2022-03-17

## 2022-03-15 RX ORDER — ASPIRIN 81 MG/1
81 TABLET ORAL DAILY
Status: DISCONTINUED | OUTPATIENT
Start: 2022-03-17 | End: 2022-03-18 | Stop reason: HOSPADM

## 2022-03-15 RX ORDER — HYDROCODONE BITARTRATE AND ACETAMINOPHEN 5; 325 MG/1; MG/1
1 TABLET ORAL EVERY 6 HOURS PRN
Status: DISCONTINUED | OUTPATIENT
Start: 2022-03-15 | End: 2022-03-16

## 2022-03-15 RX ORDER — ATORVASTATIN CALCIUM 80 MG/1
80 TABLET, FILM COATED ORAL DAILY
Status: DISCONTINUED | OUTPATIENT
Start: 2022-03-15 | End: 2022-03-18 | Stop reason: HOSPADM

## 2022-03-15 RX ORDER — ONDANSETRON 2 MG/ML
4 INJECTION INTRAMUSCULAR; INTRAVENOUS EVERY 6 HOURS PRN
Status: DISCONTINUED | OUTPATIENT
Start: 2022-03-15 | End: 2022-03-18 | Stop reason: HOSPADM

## 2022-03-15 RX ORDER — CITALOPRAM 20 MG/1
40 TABLET ORAL DAILY
Status: DISCONTINUED | OUTPATIENT
Start: 2022-03-15 | End: 2022-03-18 | Stop reason: HOSPADM

## 2022-03-15 RX ORDER — LEVOTHYROXINE SODIUM 0.12 MG/1
125 TABLET ORAL DAILY
Status: DISCONTINUED | OUTPATIENT
Start: 2022-03-16 | End: 2022-03-18 | Stop reason: HOSPADM

## 2022-03-15 RX ORDER — SODIUM CHLORIDE 0.9 % (FLUSH) 0.9 %
5-40 SYRINGE (ML) INJECTION EVERY 12 HOURS SCHEDULED
Status: DISCONTINUED | OUTPATIENT
Start: 2022-03-15 | End: 2022-03-18 | Stop reason: HOSPADM

## 2022-03-15 RX ORDER — POLYETHYLENE GLYCOL 3350 17 G/17G
17 POWDER, FOR SOLUTION ORAL DAILY PRN
Status: DISCONTINUED | OUTPATIENT
Start: 2022-03-15 | End: 2022-03-18 | Stop reason: HOSPADM

## 2022-03-15 RX ORDER — ONDANSETRON 4 MG/1
4 TABLET, ORALLY DISINTEGRATING ORAL EVERY 8 HOURS PRN
Status: DISCONTINUED | OUTPATIENT
Start: 2022-03-15 | End: 2022-03-18 | Stop reason: HOSPADM

## 2022-03-15 RX ADMIN — METHOCARBAMOL TABLETS 750 MG: 750 TABLET, COATED ORAL at 21:21

## 2022-03-15 RX ADMIN — SULFAMETHOXAZOLE AND TRIMETHOPRIM 1 TABLET: 800; 160 TABLET ORAL at 21:21

## 2022-03-15 RX ADMIN — SACUBITRIL AND VALSARTAN 1 TABLET: 49; 51 TABLET, FILM COATED ORAL at 21:22

## 2022-03-15 RX ADMIN — GABAPENTIN 300 MG: 300 CAPSULE ORAL at 21:21

## 2022-03-15 RX ADMIN — BUSPIRONE HYDROCHLORIDE 7.5 MG: 5 TABLET ORAL at 21:21

## 2022-03-15 ASSESSMENT — PAIN SCALES - GENERAL: PAINLEVEL_OUTOF10: 6

## 2022-03-15 NOTE — PROGRESS NOTES
[facility]  [address] [place]  [address]  Dept Phone: [phone]  Dept Fax: [phone]      [facility] Clinic Follow Up      Subjective:   Date of Surgery:     2/10/2022: closed reduction left pilon fracture and application of Kotlik ex fix    3/2/2022: ex fix pin exchange     Severo Rathke is a 77y.o. year old female who presents to the clinic today for routine follow up 13 days status post revision of her left ankle spanning external fixator due to pin site infection. Patient presents to the clinic today with her daughter and grandson, planning to be admitted tonight for surgery tomorrow to help control and monitor her swelling as she is traveling in from out of town. Patient states she has been compliant with strict elevation and the swelling has been controlled. She denies drainage from her pin sites and has been performing daily pin site care. Patient states she tolerated the Bactrim without any side effects. States she still gets sharp tingling pains in the foot and ankle that has remained unchanged. Denies any new injuries or falls. Review of Systems  Gen: no fever, chills, malaise  CV: no chest pain or palpitations  Resp: no cough or shortness of breath  GI: no nausea, vomiting, diarrhea, or constipation  Neuro: no seizures, vertigo, or headache  Msk: left leg swelling   10 remaining systems reviewed and negative    Objective : There were no vitals filed for this visit. Body mass index is 40.74 kg/m². General: No acute distress, resting comfortably in the clinic  Neuro: alert. oriented  Eyes: Extra-ocular muscles intact  Pulm: Respirations unlabored and regular. Skin: warm, well perfused  Psych:   Patient has good fund of knowledge and displays understanding of exam, diagnosis, and plan. LLE:  Circular frame in good condition and in appropriate position off of the skin. Pin sites without evidence of drainage, erythema or dehiscence.  Previous pin sites closed with no areas of fluctuance and minimal erythema. Diffuse swelling throughout the leg to ankle. Able to wrinkle skin on the foot. Compartments soft. 2+ DP pulse. Able to flex and extend toes. Toes warm and well perfused. Sensation intact to light touch. Radiology:  No radiographs obtained today. Assessment:   77y.o. year old female with a left pilon and distal fibula fracture s/p closed reduction and circular external fixator application on 2/36/6428, revision of ex-fix on 3/2/2022. Plan:   - Plan remains to direct admit overnight to control pre-op swelling. Patient and her family agreeable to plan in the clinic and patient directed to the hospital.  - OR tomorrow for ORIF, pending swelling     Follow up:No follow-ups on file. No orders of the defined types were placed in this encounter. No orders of the defined types were placed in this encounter. Electronically signed by SHONA Yadav CNP on 3/15/2022 at 5:09 PM    This note is created with the assistance of a speech recognition program.  While intending to generate a document that actually reflects the content of the visit, the document can still have some errors including those of syntax and sound a like substitutions which may escape proof reading.   In such instances, actual meaning can be extrapolated by contextual diversion

## 2022-03-16 ENCOUNTER — ANESTHESIA (OUTPATIENT)
Dept: OPERATING ROOM | Age: 77
DRG: 493 | End: 2022-03-16
Payer: COMMERCIAL

## 2022-03-16 ENCOUNTER — APPOINTMENT (OUTPATIENT)
Dept: GENERAL RADIOLOGY | Age: 77
DRG: 493 | End: 2022-03-16
Attending: ORTHOPAEDIC SURGERY
Payer: COMMERCIAL

## 2022-03-16 VITALS — TEMPERATURE: 98.2 F | OXYGEN SATURATION: 97 % | SYSTOLIC BLOOD PRESSURE: 167 MMHG | DIASTOLIC BLOOD PRESSURE: 92 MMHG

## 2022-03-16 LAB
ABSOLUTE EOS #: 0.22 K/UL (ref 0–0.44)
ABSOLUTE IMMATURE GRANULOCYTE: <0.03 K/UL (ref 0–0.3)
ABSOLUTE LYMPH #: 2.15 K/UL (ref 1.1–3.7)
ABSOLUTE MONO #: 0.84 K/UL (ref 0.1–1.2)
ANION GAP SERPL CALCULATED.3IONS-SCNC: 12 MMOL/L (ref 9–17)
BASOPHILS # BLD: 1 % (ref 0–2)
BASOPHILS ABSOLUTE: 0.04 K/UL (ref 0–0.2)
BUN BLDV-MCNC: 17 MG/DL (ref 8–23)
CALCIUM SERPL-MCNC: 9.4 MG/DL (ref 8.6–10.4)
CHLORIDE BLD-SCNC: 99 MMOL/L (ref 98–107)
CO2: 23 MMOL/L (ref 20–31)
CREAT SERPL-MCNC: 1.16 MG/DL (ref 0.5–0.9)
EOSINOPHILS RELATIVE PERCENT: 3 % (ref 1–4)
GFR AFRICAN AMERICAN: 55 ML/MIN
GFR NON-AFRICAN AMERICAN: 46 ML/MIN
GFR SERPL CREATININE-BSD FRML MDRD: ABNORMAL ML/MIN/{1.73_M2}
GLUCOSE BLD-MCNC: 140 MG/DL (ref 65–105)
GLUCOSE BLD-MCNC: 145 MG/DL (ref 70–99)
GLUCOSE BLD-MCNC: 229 MG/DL (ref 65–105)
GLUCOSE BLD-MCNC: 239 MG/DL (ref 65–105)
GLUCOSE BLD-MCNC: 255 MG/DL (ref 65–105)
GLUCOSE BLD-MCNC: 282 MG/DL (ref 65–105)
HCT VFR BLD CALC: 38.9 % (ref 36.3–47.1)
HEMOGLOBIN: 12 G/DL (ref 11.9–15.1)
IMMATURE GRANULOCYTES: 0 %
LYMPHOCYTES # BLD: 30 % (ref 24–43)
MCH RBC QN AUTO: 26.9 PG (ref 25.2–33.5)
MCHC RBC AUTO-ENTMCNC: 30.8 G/DL (ref 28.4–34.8)
MCV RBC AUTO: 87.2 FL (ref 82.6–102.9)
MONOCYTES # BLD: 12 % (ref 3–12)
NRBC AUTOMATED: 0 PER 100 WBC
PDW BLD-RTO: 15.7 % (ref 11.8–14.4)
PLATELET # BLD: 351 K/UL (ref 138–453)
PMV BLD AUTO: 8.8 FL (ref 8.1–13.5)
POTASSIUM SERPL-SCNC: 4.3 MMOL/L (ref 3.7–5.3)
RBC # BLD: 4.46 M/UL (ref 3.95–5.11)
RBC # BLD: ABNORMAL 10*6/UL
SEG NEUTROPHILS: 54 % (ref 36–65)
SEGMENTED NEUTROPHILS ABSOLUTE COUNT: 3.88 K/UL (ref 1.5–8.1)
SODIUM BLD-SCNC: 134 MMOL/L (ref 135–144)
WBC # BLD: 7.2 K/UL (ref 3.5–11.3)

## 2022-03-16 PROCEDURE — 2580000003 HC RX 258: Performed by: ORTHOPAEDIC SURGERY

## 2022-03-16 PROCEDURE — 64447 NJX AA&/STRD FEMORAL NRV IMG: CPT | Performed by: ANESTHESIOLOGY

## 2022-03-16 PROCEDURE — 1200000000 HC SEMI PRIVATE

## 2022-03-16 PROCEDURE — 2500000003 HC RX 250 WO HCPCS: Performed by: ANESTHESIOLOGY

## 2022-03-16 PROCEDURE — 82947 ASSAY GLUCOSE BLOOD QUANT: CPT

## 2022-03-16 PROCEDURE — 6360000002 HC RX W HCPCS

## 2022-03-16 PROCEDURE — 2580000003 HC RX 258

## 2022-03-16 PROCEDURE — 6370000000 HC RX 637 (ALT 250 FOR IP): Performed by: ORTHOPAEDIC SURGERY

## 2022-03-16 PROCEDURE — 3600000004 HC SURGERY LEVEL 4 BASE: Performed by: ORTHOPAEDIC SURGERY

## 2022-03-16 PROCEDURE — 3700000000 HC ANESTHESIA ATTENDED CARE: Performed by: ORTHOPAEDIC SURGERY

## 2022-03-16 PROCEDURE — 6360000002 HC RX W HCPCS: Performed by: ORTHOPAEDIC SURGERY

## 2022-03-16 PROCEDURE — 7100000000 HC PACU RECOVERY - FIRST 15 MIN: Performed by: ORTHOPAEDIC SURGERY

## 2022-03-16 PROCEDURE — 6370000000 HC RX 637 (ALT 250 FOR IP): Performed by: ANESTHESIOLOGY

## 2022-03-16 PROCEDURE — 2580000003 HC RX 258: Performed by: ANESTHESIOLOGY

## 2022-03-16 PROCEDURE — 3700000001 HC ADD 15 MINUTES (ANESTHESIA): Performed by: ORTHOPAEDIC SURGERY

## 2022-03-16 PROCEDURE — 6370000000 HC RX 637 (ALT 250 FOR IP): Performed by: STUDENT IN AN ORGANIZED HEALTH CARE EDUCATION/TRAINING PROGRAM

## 2022-03-16 PROCEDURE — 6360000002 HC RX W HCPCS: Performed by: ANESTHESIOLOGY

## 2022-03-16 PROCEDURE — 3209999900 FLUORO FOR SURGICAL PROCEDURES

## 2022-03-16 PROCEDURE — 6360000002 HC RX W HCPCS: Performed by: STUDENT IN AN ORGANIZED HEALTH CARE EDUCATION/TRAINING PROGRAM

## 2022-03-16 PROCEDURE — 3600000014 HC SURGERY LEVEL 4 ADDTL 15MIN: Performed by: ORTHOPAEDIC SURGERY

## 2022-03-16 PROCEDURE — 2500000003 HC RX 250 WO HCPCS: Performed by: NURSE ANESTHETIST, CERTIFIED REGISTERED

## 2022-03-16 PROCEDURE — 80048 BASIC METABOLIC PNL TOTAL CA: CPT

## 2022-03-16 PROCEDURE — 85025 COMPLETE CBC W/AUTO DIFF WBC: CPT

## 2022-03-16 PROCEDURE — 73590 X-RAY EXAM OF LOWER LEG: CPT

## 2022-03-16 PROCEDURE — 2500000003 HC RX 250 WO HCPCS

## 2022-03-16 PROCEDURE — 76942 ECHO GUIDE FOR BIOPSY: CPT | Performed by: ANESTHESIOLOGY

## 2022-03-16 PROCEDURE — 2709999900 HC NON-CHARGEABLE SUPPLY: Performed by: ORTHOPAEDIC SURGERY

## 2022-03-16 PROCEDURE — 0QSK06Z REPOSITION LEFT FIBULA WITH INTRAMEDULLARY INTERNAL FIXATION DEVICE, OPEN APPROACH: ICD-10-PCS | Performed by: ORTHOPAEDIC SURGERY

## 2022-03-16 PROCEDURE — 36415 COLL VENOUS BLD VENIPUNCTURE: CPT

## 2022-03-16 PROCEDURE — 82962 GLUCOSE BLOOD TEST: CPT

## 2022-03-16 PROCEDURE — 7100000001 HC PACU RECOVERY - ADDTL 15 MIN: Performed by: ORTHOPAEDIC SURGERY

## 2022-03-16 PROCEDURE — 2720000010 HC SURG SUPPLY STERILE: Performed by: ORTHOPAEDIC SURGERY

## 2022-03-16 PROCEDURE — C1713 ANCHOR/SCREW BN/BN,TIS/BN: HCPCS | Performed by: ORTHOPAEDIC SURGERY

## 2022-03-16 DEVICE — IMPLANTABLE DEVICE: Type: IMPLANTABLE DEVICE | Site: LEG | Status: FUNCTIONAL

## 2022-03-16 DEVICE — SCREW BNE L44MM DIA2.7MM ANK S STL ST VAR ANG LOK FULL THRD: Type: IMPLANTABLE DEVICE | Site: LEG | Status: FUNCTIONAL

## 2022-03-16 DEVICE — SCREW BNE L22MM DIA3.5MM CORT S STL ST NONCANNULATED LOK: Type: IMPLANTABLE DEVICE | Site: LEG | Status: FUNCTIONAL

## 2022-03-16 DEVICE — SCREW BNE L46MM DIA2.7MM ANK S STL ST VAR ANG LOK FULL THRD: Type: IMPLANTABLE DEVICE | Site: LEG | Status: FUNCTIONAL

## 2022-03-16 DEVICE — DBM T43105 5CC GRAFTON PUTTY
Type: IMPLANTABLE DEVICE | Site: LEG | Status: FUNCTIONAL
Brand: GRAFTON®AND GRAFTON PLUS®DEMINERALIZED BONE MATRIX (DBM)

## 2022-03-16 DEVICE — SCREW BNE L46MM DIA2.7MM MTPHSEAL S STL ST FULL THRD T8: Type: IMPLANTABLE DEVICE | Site: LEG | Status: FUNCTIONAL

## 2022-03-16 DEVICE — SCREW BNE L28MM DIA3.5MM CORT S STL ST NONCANNULATED LOK: Type: IMPLANTABLE DEVICE | Site: LEG | Status: FUNCTIONAL

## 2022-03-16 DEVICE — SCREW BNE L24MM DIA3.5MM CORT S STL ST NONCANNULATED LOK: Type: IMPLANTABLE DEVICE | Site: LEG | Status: FUNCTIONAL

## 2022-03-16 DEVICE — GRAFT BONE SUB 30CC L1-10MM CANC CRUSH FRZ DRY: Type: IMPLANTABLE DEVICE | Site: LEG | Status: FUNCTIONAL

## 2022-03-16 DEVICE — SCREW BNE L26MM DIA3.5MM CORT S STL ST NONCANNULATED LOK: Type: IMPLANTABLE DEVICE | Site: LEG | Status: FUNCTIONAL

## 2022-03-16 DEVICE — SCREW BNE L42MM DIA2.7MM ANK S STL ST VAR ANG LOK FULL THRD: Type: IMPLANTABLE DEVICE | Site: LEG | Status: FUNCTIONAL

## 2022-03-16 DEVICE — SCREW BNE L90MM DIA3.5MM PELV CORT S STL ST THRD SM HEX: Type: IMPLANTABLE DEVICE | Site: LEG | Status: FUNCTIONAL

## 2022-03-16 RX ORDER — OXYCODONE HYDROCHLORIDE 5 MG/1
5 TABLET ORAL EVERY 6 HOURS PRN
Status: DISCONTINUED | OUTPATIENT
Start: 2022-03-16 | End: 2022-03-18 | Stop reason: HOSPADM

## 2022-03-16 RX ORDER — DEXAMETHASONE SODIUM PHOSPHATE 10 MG/ML
10 INJECTION INTRAMUSCULAR; INTRAVENOUS ONCE
Status: DISCONTINUED | OUTPATIENT
Start: 2022-03-16 | End: 2022-03-18 | Stop reason: HOSPADM

## 2022-03-16 RX ORDER — MAGNESIUM HYDROXIDE 1200 MG/15ML
LIQUID ORAL CONTINUOUS PRN
Status: COMPLETED | OUTPATIENT
Start: 2022-03-16 | End: 2022-03-16

## 2022-03-16 RX ORDER — BUPIVACAINE HYDROCHLORIDE 5 MG/ML
INJECTION, SOLUTION EPIDURAL; INTRACAUDAL
Status: COMPLETED | OUTPATIENT
Start: 2022-03-16 | End: 2022-03-16

## 2022-03-16 RX ORDER — FENTANYL CITRATE 50 UG/ML
25 INJECTION, SOLUTION INTRAMUSCULAR; INTRAVENOUS EVERY 5 MIN PRN
Status: DISCONTINUED | OUTPATIENT
Start: 2022-03-16 | End: 2022-03-16 | Stop reason: HOSPADM

## 2022-03-16 RX ORDER — FENTANYL CITRATE 50 UG/ML
100 INJECTION, SOLUTION INTRAMUSCULAR; INTRAVENOUS ONCE
Status: COMPLETED | OUTPATIENT
Start: 2022-03-16 | End: 2022-03-16

## 2022-03-16 RX ORDER — ROCURONIUM BROMIDE 10 MG/ML
INJECTION, SOLUTION INTRAVENOUS PRN
Status: DISCONTINUED | OUTPATIENT
Start: 2022-03-16 | End: 2022-03-16 | Stop reason: SDUPTHER

## 2022-03-16 RX ORDER — MEPERIDINE HYDROCHLORIDE 50 MG/ML
12.5 INJECTION INTRAMUSCULAR; INTRAVENOUS; SUBCUTANEOUS EVERY 5 MIN PRN
Status: DISCONTINUED | OUTPATIENT
Start: 2022-03-16 | End: 2022-03-16 | Stop reason: HOSPADM

## 2022-03-16 RX ORDER — KETOROLAC TROMETHAMINE 15 MG/ML
15 INJECTION, SOLUTION INTRAMUSCULAR; INTRAVENOUS EVERY 6 HOURS
Status: DISCONTINUED | OUTPATIENT
Start: 2022-03-16 | End: 2022-03-17

## 2022-03-16 RX ORDER — LIDOCAINE HYDROCHLORIDE 10 MG/ML
INJECTION, SOLUTION EPIDURAL; INFILTRATION; INTRACAUDAL; PERINEURAL PRN
Status: DISCONTINUED | OUTPATIENT
Start: 2022-03-16 | End: 2022-03-16 | Stop reason: SDUPTHER

## 2022-03-16 RX ORDER — DROPERIDOL 2.5 MG/ML
0.62 INJECTION, SOLUTION INTRAMUSCULAR; INTRAVENOUS
Status: DISCONTINUED | OUTPATIENT
Start: 2022-03-16 | End: 2022-03-16 | Stop reason: HOSPADM

## 2022-03-16 RX ORDER — SODIUM CHLORIDE 0.9 % (FLUSH) 0.9 %
5-40 SYRINGE (ML) INJECTION PRN
Status: DISCONTINUED | OUTPATIENT
Start: 2022-03-16 | End: 2022-03-16 | Stop reason: HOSPADM

## 2022-03-16 RX ORDER — DEXAMETHASONE SODIUM PHOSPHATE 10 MG/ML
INJECTION INTRAMUSCULAR; INTRAVENOUS PRN
Status: DISCONTINUED | OUTPATIENT
Start: 2022-03-16 | End: 2022-03-16 | Stop reason: SDUPTHER

## 2022-03-16 RX ORDER — MAGNESIUM SULFATE 1 G/100ML
INJECTION INTRAVENOUS PRN
Status: DISCONTINUED | OUTPATIENT
Start: 2022-03-16 | End: 2022-03-16 | Stop reason: SDUPTHER

## 2022-03-16 RX ORDER — MIDAZOLAM HYDROCHLORIDE 2 MG/2ML
2 INJECTION, SOLUTION INTRAMUSCULAR; INTRAVENOUS ONCE
Status: COMPLETED | OUTPATIENT
Start: 2022-03-16 | End: 2022-03-16

## 2022-03-16 RX ORDER — SODIUM CHLORIDE 9 MG/ML
INJECTION, SOLUTION INTRAVENOUS CONTINUOUS PRN
Status: DISCONTINUED | OUTPATIENT
Start: 2022-03-16 | End: 2022-03-16 | Stop reason: SDUPTHER

## 2022-03-16 RX ORDER — VANCOMYCIN HYDROCHLORIDE 1 G/20ML
INJECTION, POWDER, LYOPHILIZED, FOR SOLUTION INTRAVENOUS PRN
Status: DISCONTINUED | OUTPATIENT
Start: 2022-03-16 | End: 2022-03-16 | Stop reason: ALTCHOICE

## 2022-03-16 RX ORDER — PROPOFOL 10 MG/ML
INJECTION, EMULSION INTRAVENOUS PRN
Status: DISCONTINUED | OUTPATIENT
Start: 2022-03-16 | End: 2022-03-16 | Stop reason: SDUPTHER

## 2022-03-16 RX ORDER — ACETAMINOPHEN 500 MG
1000 TABLET ORAL EVERY 6 HOURS
Status: DISCONTINUED | OUTPATIENT
Start: 2022-03-16 | End: 2022-03-18 | Stop reason: HOSPADM

## 2022-03-16 RX ORDER — OXYCODONE HYDROCHLORIDE 5 MG/1
10 TABLET ORAL EVERY 6 HOURS PRN
Status: DISCONTINUED | OUTPATIENT
Start: 2022-03-16 | End: 2022-03-18 | Stop reason: HOSPADM

## 2022-03-16 RX ORDER — SODIUM CHLORIDE 0.9 % (FLUSH) 0.9 %
5-40 SYRINGE (ML) INJECTION EVERY 12 HOURS SCHEDULED
Status: DISCONTINUED | OUTPATIENT
Start: 2022-03-16 | End: 2022-03-16 | Stop reason: HOSPADM

## 2022-03-16 RX ORDER — SODIUM CHLORIDE 9 MG/ML
25 INJECTION, SOLUTION INTRAVENOUS PRN
Status: DISCONTINUED | OUTPATIENT
Start: 2022-03-16 | End: 2022-03-16 | Stop reason: HOSPADM

## 2022-03-16 RX ORDER — PHENYLEPHRINE HCL IN 0.9% NACL 1 MG/10 ML
SYRINGE (ML) INTRAVENOUS PRN
Status: DISCONTINUED | OUTPATIENT
Start: 2022-03-16 | End: 2022-03-16 | Stop reason: SDUPTHER

## 2022-03-16 RX ORDER — 0.9 % SODIUM CHLORIDE 0.9 %
500 INTRAVENOUS SOLUTION INTRAVENOUS ONCE
Status: COMPLETED | OUTPATIENT
Start: 2022-03-16 | End: 2022-03-16

## 2022-03-16 RX ORDER — FENTANYL CITRATE 50 UG/ML
INJECTION, SOLUTION INTRAMUSCULAR; INTRAVENOUS PRN
Status: DISCONTINUED | OUTPATIENT
Start: 2022-03-16 | End: 2022-03-16 | Stop reason: SDUPTHER

## 2022-03-16 RX ORDER — DOCUSATE SODIUM 100 MG/1
100 CAPSULE, LIQUID FILLED ORAL DAILY
Status: DISCONTINUED | OUTPATIENT
Start: 2022-03-17 | End: 2022-03-18 | Stop reason: HOSPADM

## 2022-03-16 RX ORDER — BUPIVACAINE HYDROCHLORIDE 5 MG/ML
30 INJECTION, SOLUTION EPIDURAL; INTRACAUDAL ONCE
Status: DISCONTINUED | OUTPATIENT
Start: 2022-03-16 | End: 2022-03-16 | Stop reason: HOSPADM

## 2022-03-16 RX ORDER — DIPHENHYDRAMINE HYDROCHLORIDE 50 MG/ML
12.5 INJECTION INTRAMUSCULAR; INTRAVENOUS
Status: DISCONTINUED | OUTPATIENT
Start: 2022-03-16 | End: 2022-03-16 | Stop reason: HOSPADM

## 2022-03-16 RX ORDER — SODIUM CHLORIDE, SODIUM LACTATE, POTASSIUM CHLORIDE, CALCIUM CHLORIDE 600; 310; 30; 20 MG/100ML; MG/100ML; MG/100ML; MG/100ML
INJECTION, SOLUTION INTRAVENOUS CONTINUOUS PRN
Status: DISCONTINUED | OUTPATIENT
Start: 2022-03-16 | End: 2022-03-16 | Stop reason: SDUPTHER

## 2022-03-16 RX ORDER — LABETALOL HYDROCHLORIDE 5 MG/ML
10 INJECTION, SOLUTION INTRAVENOUS
Status: DISCONTINUED | OUTPATIENT
Start: 2022-03-16 | End: 2022-03-16 | Stop reason: HOSPADM

## 2022-03-16 RX ORDER — EPHEDRINE SULFATE/0.9% NACL/PF 50 MG/5 ML
SYRINGE (ML) INTRAVENOUS PRN
Status: DISCONTINUED | OUTPATIENT
Start: 2022-03-16 | End: 2022-03-16 | Stop reason: SDUPTHER

## 2022-03-16 RX ADMIN — SACUBITRIL AND VALSARTAN 1 TABLET: 49; 51 TABLET, FILM COATED ORAL at 08:43

## 2022-03-16 RX ADMIN — GABAPENTIN 300 MG: 300 CAPSULE ORAL at 04:15

## 2022-03-16 RX ADMIN — ATORVASTATIN CALCIUM 80 MG: 80 TABLET, FILM COATED ORAL at 08:44

## 2022-03-16 RX ADMIN — ROCURONIUM BROMIDE 50 MG: 10 INJECTION INTRAVENOUS at 13:11

## 2022-03-16 RX ADMIN — Medication 200 MCG: at 17:04

## 2022-03-16 RX ADMIN — FENTANYL CITRATE 50 MCG: 50 INJECTION INTRAMUSCULAR; INTRAVENOUS at 10:02

## 2022-03-16 RX ADMIN — Medication 200 MCG: at 16:38

## 2022-03-16 RX ADMIN — HYDROCODONE BITARTRATE AND ACETAMINOPHEN 1 TABLET: 5; 325 TABLET ORAL at 04:15

## 2022-03-16 RX ADMIN — ROCURONIUM BROMIDE 20 MG: 10 INJECTION INTRAVENOUS at 13:56

## 2022-03-16 RX ADMIN — FENTANYL CITRATE 50 MCG: 50 INJECTION, SOLUTION INTRAMUSCULAR; INTRAVENOUS at 15:03

## 2022-03-16 RX ADMIN — Medication 200 MCG: at 15:58

## 2022-03-16 RX ADMIN — Medication 200 MCG: at 17:43

## 2022-03-16 RX ADMIN — HYDROMORPHONE HYDROCHLORIDE 0.25 MG: 1 INJECTION, SOLUTION INTRAMUSCULAR; INTRAVENOUS; SUBCUTANEOUS at 21:10

## 2022-03-16 RX ADMIN — CITALOPRAM 40 MG: 20 TABLET, FILM COATED ORAL at 08:44

## 2022-03-16 RX ADMIN — Medication 100 MCG: at 14:07

## 2022-03-16 RX ADMIN — Medication 50 MCG: at 14:22

## 2022-03-16 RX ADMIN — ROCURONIUM BROMIDE 20 MG: 10 INJECTION INTRAVENOUS at 14:40

## 2022-03-16 RX ADMIN — INSULIN HUMAN 5 UNITS: 100 INJECTION, SOLUTION PARENTERAL at 15:38

## 2022-03-16 RX ADMIN — MAGNESIUM SULFATE HEPTAHYDRATE 1000 MG: 1 INJECTION, SOLUTION INTRAVENOUS at 13:46

## 2022-03-16 RX ADMIN — HYDROCODONE BITARTRATE AND ACETAMINOPHEN 1 TABLET: 5; 325 TABLET ORAL at 20:10

## 2022-03-16 RX ADMIN — PROPOFOL 200 MG: 10 INJECTION, EMULSION INTRAVENOUS at 13:11

## 2022-03-16 RX ADMIN — PANTOPRAZOLE SODIUM 40 MG: 40 TABLET, DELAYED RELEASE ORAL at 06:02

## 2022-03-16 RX ADMIN — SODIUM CHLORIDE: 9 INJECTION, SOLUTION INTRAVENOUS at 17:39

## 2022-03-16 RX ADMIN — SODIUM CHLORIDE 500 ML: 9 INJECTION, SOLUTION INTRAVENOUS at 19:35

## 2022-03-16 RX ADMIN — ROCURONIUM BROMIDE 10 MG: 10 INJECTION INTRAVENOUS at 17:38

## 2022-03-16 RX ADMIN — DEXAMETHASONE SODIUM PHOSPHATE 4 MG: 10 INJECTION INTRAMUSCULAR; INTRAVENOUS at 13:19

## 2022-03-16 RX ADMIN — Medication 200 MCG: at 16:10

## 2022-03-16 RX ADMIN — SODIUM CHLORIDE: 9 INJECTION, SOLUTION INTRAVENOUS at 13:20

## 2022-03-16 RX ADMIN — MAGNESIUM SULFATE HEPTAHYDRATE 1000 MG: 1 INJECTION, SOLUTION INTRAVENOUS at 14:08

## 2022-03-16 RX ADMIN — KETOROLAC TROMETHAMINE 15 MG: 15 INJECTION, SOLUTION INTRAMUSCULAR; INTRAVENOUS at 21:59

## 2022-03-16 RX ADMIN — SULFAMETHOXAZOLE AND TRIMETHOPRIM 1 TABLET: 800; 160 TABLET ORAL at 08:43

## 2022-03-16 RX ADMIN — SUGAMMADEX 200 MG: 100 INJECTION, SOLUTION INTRAVENOUS at 18:56

## 2022-03-16 RX ADMIN — GABAPENTIN 300 MG: 300 CAPSULE ORAL at 20:10

## 2022-03-16 RX ADMIN — Medication 200 MCG: at 16:52

## 2022-03-16 RX ADMIN — FENTANYL CITRATE 50 MCG: 50 INJECTION, SOLUTION INTRAMUSCULAR; INTRAVENOUS at 15:11

## 2022-03-16 RX ADMIN — BUSPIRONE HYDROCHLORIDE 7.5 MG: 5 TABLET ORAL at 08:44

## 2022-03-16 RX ADMIN — HYDROMORPHONE HYDROCHLORIDE 0.25 MG: 1 INJECTION, SOLUTION INTRAMUSCULAR; INTRAVENOUS; SUBCUTANEOUS at 23:09

## 2022-03-16 RX ADMIN — MIDAZOLAM HYDROCHLORIDE 1 MG: 1 INJECTION, SOLUTION INTRAMUSCULAR; INTRAVENOUS at 10:02

## 2022-03-16 RX ADMIN — BUPIVACAINE HYDROCHLORIDE 15 ML: 5 INJECTION, SOLUTION EPIDURAL; INTRACAUDAL at 10:24

## 2022-03-16 RX ADMIN — FENTANYL CITRATE 50 MCG: 50 INJECTION, SOLUTION INTRAMUSCULAR; INTRAVENOUS at 15:27

## 2022-03-16 RX ADMIN — METHOCARBAMOL TABLETS 750 MG: 750 TABLET, COATED ORAL at 20:10

## 2022-03-16 RX ADMIN — BUPIVACAINE HYDROCHLORIDE 25 ML: 5 INJECTION, SOLUTION EPIDURAL; INTRACAUDAL; PERINEURAL at 10:21

## 2022-03-16 RX ADMIN — CEFAZOLIN SODIUM 2000 MG: 10 INJECTION, POWDER, FOR SOLUTION INTRAVENOUS at 17:09

## 2022-03-16 RX ADMIN — FENTANYL CITRATE 100 MCG: 50 INJECTION, SOLUTION INTRAMUSCULAR; INTRAVENOUS at 13:11

## 2022-03-16 RX ADMIN — Medication 200 MCG: at 13:22

## 2022-03-16 RX ADMIN — LEVOTHYROXINE SODIUM 125 MCG: 125 TABLET ORAL at 06:02

## 2022-03-16 RX ADMIN — Medication 10 MG: at 13:32

## 2022-03-16 RX ADMIN — ONDANSETRON 4 MG: 2 INJECTION INTRAMUSCULAR; INTRAVENOUS at 21:16

## 2022-03-16 RX ADMIN — DICYCLOMINE HYDROCHLORIDE 10 MG: 10 CAPSULE ORAL at 06:02

## 2022-03-16 RX ADMIN — Medication 50 MCG: at 14:32

## 2022-03-16 RX ADMIN — ROCURONIUM BROMIDE 10 MG: 10 INJECTION INTRAVENOUS at 15:27

## 2022-03-16 RX ADMIN — Medication 100 MCG: at 15:52

## 2022-03-16 RX ADMIN — ROCURONIUM BROMIDE 20 MG: 10 INJECTION INTRAVENOUS at 16:56

## 2022-03-16 RX ADMIN — PHENYLEPHRINE HYDROCHLORIDE 50 MCG/MIN: 10 INJECTION INTRAVENOUS at 13:28

## 2022-03-16 RX ADMIN — SODIUM CHLORIDE, POTASSIUM CHLORIDE, SODIUM LACTATE AND CALCIUM CHLORIDE: 600; 310; 30; 20 INJECTION, SOLUTION INTRAVENOUS at 12:57

## 2022-03-16 RX ADMIN — ROCURONIUM BROMIDE 20 MG: 10 INJECTION INTRAVENOUS at 16:07

## 2022-03-16 RX ADMIN — Medication 100 MCG: at 16:03

## 2022-03-16 RX ADMIN — LIDOCAINE HYDROCHLORIDE 50 MG: 10 INJECTION, SOLUTION EPIDURAL; INFILTRATION; INTRACAUDAL; PERINEURAL at 13:11

## 2022-03-16 RX ADMIN — METHOCARBAMOL TABLETS 750 MG: 750 TABLET, COATED ORAL at 08:43

## 2022-03-16 RX ADMIN — CEFAZOLIN SODIUM 2000 MG: 10 INJECTION, POWDER, FOR SOLUTION INTRAVENOUS at 13:22

## 2022-03-16 RX ADMIN — Medication 100 MCG: at 15:48

## 2022-03-16 RX ADMIN — BRIMONIDINE TARTRATE 1 DROP: 2 SOLUTION OPHTHALMIC at 08:44

## 2022-03-16 RX ADMIN — Medication 200 MCG: at 16:17

## 2022-03-16 ASSESSMENT — PULMONARY FUNCTION TESTS
PIF_VALUE: 26
PIF_VALUE: 23
PIF_VALUE: 27
PIF_VALUE: 25
PIF_VALUE: 25
PIF_VALUE: 24
PIF_VALUE: 23
PIF_VALUE: 23
PIF_VALUE: 25
PIF_VALUE: 23
PIF_VALUE: 25
PIF_VALUE: 22
PIF_VALUE: 1
PIF_VALUE: 23
PIF_VALUE: 22
PIF_VALUE: 25
PIF_VALUE: 24
PIF_VALUE: 25
PIF_VALUE: 23
PIF_VALUE: 24
PIF_VALUE: 25
PIF_VALUE: 25
PIF_VALUE: 26
PIF_VALUE: 26
PIF_VALUE: 25
PIF_VALUE: 23
PIF_VALUE: 25
PIF_VALUE: 25
PIF_VALUE: 26
PIF_VALUE: 28
PIF_VALUE: 23
PIF_VALUE: 25
PIF_VALUE: 23
PIF_VALUE: 20
PIF_VALUE: 25
PIF_VALUE: 26
PIF_VALUE: 24
PIF_VALUE: 22
PIF_VALUE: 24
PIF_VALUE: 23
PIF_VALUE: 23
PIF_VALUE: 26
PIF_VALUE: 25
PIF_VALUE: 22
PIF_VALUE: 23
PIF_VALUE: 24
PIF_VALUE: 23
PIF_VALUE: 22
PIF_VALUE: 21
PIF_VALUE: 2
PIF_VALUE: 26
PIF_VALUE: 26
PIF_VALUE: 23
PIF_VALUE: 25
PIF_VALUE: 23
PIF_VALUE: 23
PIF_VALUE: 26
PIF_VALUE: 26
PIF_VALUE: 23
PIF_VALUE: 25
PIF_VALUE: 26
PIF_VALUE: 1
PIF_VALUE: 26
PIF_VALUE: 25
PIF_VALUE: 25
PIF_VALUE: 24
PIF_VALUE: 25
PIF_VALUE: 24
PIF_VALUE: 25
PIF_VALUE: 26
PIF_VALUE: 24
PIF_VALUE: 23
PIF_VALUE: 25
PIF_VALUE: 23
PIF_VALUE: 21
PIF_VALUE: 23
PIF_VALUE: 22
PIF_VALUE: 26
PIF_VALUE: 25
PIF_VALUE: 22
PIF_VALUE: 25
PIF_VALUE: 23
PIF_VALUE: 22
PIF_VALUE: 23
PIF_VALUE: 23
PIF_VALUE: 22
PIF_VALUE: 26
PIF_VALUE: 25
PIF_VALUE: 1
PIF_VALUE: 24
PIF_VALUE: 23
PIF_VALUE: 25
PIF_VALUE: 23
PIF_VALUE: 26
PIF_VALUE: 22
PIF_VALUE: 22
PIF_VALUE: 24
PIF_VALUE: 22
PIF_VALUE: 26
PIF_VALUE: 22
PIF_VALUE: 24
PIF_VALUE: 25
PIF_VALUE: 25
PIF_VALUE: 23
PIF_VALUE: 24
PIF_VALUE: 26
PIF_VALUE: 24
PIF_VALUE: 26
PIF_VALUE: 25
PIF_VALUE: 22
PIF_VALUE: 23
PIF_VALUE: 26
PIF_VALUE: 25
PIF_VALUE: 25
PIF_VALUE: 23
PIF_VALUE: 23
PIF_VALUE: 25
PIF_VALUE: 24
PIF_VALUE: 21
PIF_VALUE: 25
PIF_VALUE: 24
PIF_VALUE: 25
PIF_VALUE: 0
PIF_VALUE: 26
PIF_VALUE: 23
PIF_VALUE: 1
PIF_VALUE: 32
PIF_VALUE: 23
PIF_VALUE: 24
PIF_VALUE: 24
PIF_VALUE: 26
PIF_VALUE: 25
PIF_VALUE: 24
PIF_VALUE: 22
PIF_VALUE: 23
PIF_VALUE: 24
PIF_VALUE: 25
PIF_VALUE: 24
PIF_VALUE: 25
PIF_VALUE: 26
PIF_VALUE: 23
PIF_VALUE: 23
PIF_VALUE: 25
PIF_VALUE: 23
PIF_VALUE: 26
PIF_VALUE: 23
PIF_VALUE: 23
PIF_VALUE: 24
PIF_VALUE: 0
PIF_VALUE: 23
PIF_VALUE: 22
PIF_VALUE: 25
PIF_VALUE: 25
PIF_VALUE: 24
PIF_VALUE: 0
PIF_VALUE: 22
PIF_VALUE: 30
PIF_VALUE: 26
PIF_VALUE: 6
PIF_VALUE: 25
PIF_VALUE: 23
PIF_VALUE: 25
PIF_VALUE: 25
PIF_VALUE: 23
PIF_VALUE: 26
PIF_VALUE: 22
PIF_VALUE: 26
PIF_VALUE: 23
PIF_VALUE: 1
PIF_VALUE: 23
PIF_VALUE: 25
PIF_VALUE: 23
PIF_VALUE: 23
PIF_VALUE: 25
PIF_VALUE: 24
PIF_VALUE: 24
PIF_VALUE: 26
PIF_VALUE: 22
PIF_VALUE: 22
PIF_VALUE: 26
PIF_VALUE: 3
PIF_VALUE: 25
PIF_VALUE: 26
PIF_VALUE: 23
PIF_VALUE: 26
PIF_VALUE: 24
PIF_VALUE: 26
PIF_VALUE: 23
PIF_VALUE: 21
PIF_VALUE: 26
PIF_VALUE: 26
PIF_VALUE: 25
PIF_VALUE: 25
PIF_VALUE: 24
PIF_VALUE: 25
PIF_VALUE: 22
PIF_VALUE: 26
PIF_VALUE: 23
PIF_VALUE: 25
PIF_VALUE: 23
PIF_VALUE: 25
PIF_VALUE: 23
PIF_VALUE: 26
PIF_VALUE: 22
PIF_VALUE: 25
PIF_VALUE: 25
PIF_VALUE: 23
PIF_VALUE: 26
PIF_VALUE: 23
PIF_VALUE: 23
PIF_VALUE: 25
PIF_VALUE: 25
PIF_VALUE: 1
PIF_VALUE: 23
PIF_VALUE: 25
PIF_VALUE: 23
PIF_VALUE: 24
PIF_VALUE: 0
PIF_VALUE: 25
PIF_VALUE: 24
PIF_VALUE: 0
PIF_VALUE: 23
PIF_VALUE: 24
PIF_VALUE: 23
PIF_VALUE: 26
PIF_VALUE: 23
PIF_VALUE: 24
PIF_VALUE: 23
PIF_VALUE: 23
PIF_VALUE: 26
PIF_VALUE: 24
PIF_VALUE: 23
PIF_VALUE: 24
PIF_VALUE: 23
PIF_VALUE: 22
PIF_VALUE: 26
PIF_VALUE: 24
PIF_VALUE: 25
PIF_VALUE: 23
PIF_VALUE: 24
PIF_VALUE: 25
PIF_VALUE: 26
PIF_VALUE: 23
PIF_VALUE: 25
PIF_VALUE: 24
PIF_VALUE: 22
PIF_VALUE: 23
PIF_VALUE: 25
PIF_VALUE: 25
PIF_VALUE: 24
PIF_VALUE: 23
PIF_VALUE: 24
PIF_VALUE: 25
PIF_VALUE: 26
PIF_VALUE: 22
PIF_VALUE: 26
PIF_VALUE: 25
PIF_VALUE: 0
PIF_VALUE: 1
PIF_VALUE: 24
PIF_VALUE: 25
PIF_VALUE: 24
PIF_VALUE: 23
PIF_VALUE: 25
PIF_VALUE: 25
PIF_VALUE: 23
PIF_VALUE: 23
PIF_VALUE: 1
PIF_VALUE: 23
PIF_VALUE: 26
PIF_VALUE: 22
PIF_VALUE: 22
PIF_VALUE: 24
PIF_VALUE: 22
PIF_VALUE: 23
PIF_VALUE: 22
PIF_VALUE: 23
PIF_VALUE: 25
PIF_VALUE: 26
PIF_VALUE: 21
PIF_VALUE: 23
PIF_VALUE: 26
PIF_VALUE: 23
PIF_VALUE: 23
PIF_VALUE: 26
PIF_VALUE: 23
PIF_VALUE: 25
PIF_VALUE: 26
PIF_VALUE: 25
PIF_VALUE: 23
PIF_VALUE: 25
PIF_VALUE: 21
PIF_VALUE: 23
PIF_VALUE: 25
PIF_VALUE: 24
PIF_VALUE: 25
PIF_VALUE: 24
PIF_VALUE: 25
PIF_VALUE: 26
PIF_VALUE: 23
PIF_VALUE: 24
PIF_VALUE: 24
PIF_VALUE: 25
PIF_VALUE: 0
PIF_VALUE: 25
PIF_VALUE: 23
PIF_VALUE: 26
PIF_VALUE: 23
PIF_VALUE: 28
PIF_VALUE: 22
PIF_VALUE: 24
PIF_VALUE: 26
PIF_VALUE: 24
PIF_VALUE: 25
PIF_VALUE: 24
PIF_VALUE: 26
PIF_VALUE: 18
PIF_VALUE: 23
PIF_VALUE: 22
PIF_VALUE: 26
PIF_VALUE: 23
PIF_VALUE: 26
PIF_VALUE: 25
PIF_VALUE: 22
PIF_VALUE: 25
PIF_VALUE: 21
PIF_VALUE: 26
PIF_VALUE: 24
PIF_VALUE: 24
PIF_VALUE: 26
PIF_VALUE: 25
PIF_VALUE: 24
PIF_VALUE: 1
PIF_VALUE: 0
PIF_VALUE: 23
PIF_VALUE: 23
PIF_VALUE: 25
PIF_VALUE: 23
PIF_VALUE: 26
PIF_VALUE: 23

## 2022-03-16 ASSESSMENT — PAIN - FUNCTIONAL ASSESSMENT: PAIN_FUNCTIONAL_ASSESSMENT: 0-10

## 2022-03-16 ASSESSMENT — PAIN SCALES - GENERAL
PAINLEVEL_OUTOF10: 10
PAINLEVEL_OUTOF10: 5
PAINLEVEL_OUTOF10: 9
PAINLEVEL_OUTOF10: 10
PAINLEVEL_OUTOF10: 10
PAINLEVEL_OUTOF10: 7
PAINLEVEL_OUTOF10: 0
PAINLEVEL_OUTOF10: 0
PAINLEVEL_OUTOF10: 9
PAINLEVEL_OUTOF10: 6
PAINLEVEL_OUTOF10: 8
PAINLEVEL_OUTOF10: 0

## 2022-03-16 ASSESSMENT — PAIN DESCRIPTION - PROGRESSION
CLINICAL_PROGRESSION: NOT CHANGED
CLINICAL_PROGRESSION: NOT CHANGED

## 2022-03-16 ASSESSMENT — PAIN DESCRIPTION - FREQUENCY: FREQUENCY: CONTINUOUS

## 2022-03-16 ASSESSMENT — PAIN DESCRIPTION - PAIN TYPE: TYPE: CHRONIC PAIN

## 2022-03-16 ASSESSMENT — PAIN DESCRIPTION - DESCRIPTORS
DESCRIPTORS: ACHING;CONSTANT
DESCRIPTORS: ACHING;CONSTANT

## 2022-03-16 ASSESSMENT — PAIN DESCRIPTION - ONSET: ONSET: ON-GOING

## 2022-03-16 ASSESSMENT — PAIN DESCRIPTION - LOCATION: LOCATION: ANKLE;FOOT

## 2022-03-16 ASSESSMENT — PAIN DESCRIPTION - ORIENTATION: ORIENTATION: LEFT

## 2022-03-16 ASSESSMENT — ENCOUNTER SYMPTOMS
SHORTNESS OF BREATH: 1
DYSPNEA ACTIVITY LEVEL: NO INTERVAL CHANGE
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
## 2022-03-16 NOTE — ANESTHESIA PROCEDURE NOTES
Peripheral Block    Patient location during procedure: pre-op  Start time: 3/16/2022 10:02 AM  End time: 3/16/2022 10:09 AM  Staffing  Performed: anesthesiologist   Anesthesiologist: Radha Ulloa MD  Preanesthetic Checklist  Completed: patient identified, IV checked, site marked, risks and benefits discussed, surgical consent, monitors and equipment checked, pre-op evaluation, timeout performed, anesthesia consent given, oxygen available and patient being monitored  Peripheral Block  Patient position: supine  Prep: ChloraPrep  Patient monitoring: cardiac monitor, continuous pulse ox, frequent blood pressure checks and IV access  Block type: Saphenous  Laterality: left  Injection technique: single-shot  Guidance: ultrasound guided  Local infiltration: lidocaine  Infiltration strength: 1 %  Dose: 3 mL  Provider prep: mask and sterile gloves  Local infiltration: lidocaine  Needle  Needle gauge: 21 G  Needle length: 10 cm  Needle localization: ultrasound guidance  Test dose: negative  Assessment  Injection assessment: negative aspiration for heme, no paresthesia on injection and local visualized surrounding nerve on ultrasound  Paresthesia pain: none  Slow fractionated injection: yes  Hemodynamics: stable  Additional Notes  U/S [id number].  (1) Under ultrasound guidance, a 21 gauge needle was inserted and placed in close proximity to the saphenous nerve.  (2) Ultrasound was also used to visualize the spread of the anesthetic in close proximity to the nerve being blocked. (3) The nerve appeared anatomically normal, and (4 there were no apparent abnormal pathological findings on the image that were readily visible and related to the nerve being blocked. (5) A permanent ultrasound image was saved in the patient's record.             Medications Administered  Bupivacaine (MARCAINE) PF injection 0.5%, 15 mL  Reason for block: post-op pain management and at surgeon's request
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
## 2022-03-16 NOTE — PROGRESS NOTES
Physical Therapy        Physical Therapy Cancel Note      DATE: 3/16/2022    NAME: Severo Rathke  MRN: [id number]   : 1945      Patient not seen this date for Physical Therapy due to:    Surgery/Procedure: Repair L ankle      Electronically signed by Julio Pedraza PT on 3/16/2022 at 7:37 AM

## 2022-03-16 NOTE — PROGRESS NOTES
Occupational [address] [place]  Occupational Therapy Not Seen Note    DATE: 3/16/2022    NAME: Kwaku George  MRN: [id number]   : 1945      Patient not seen this date for Occupational Therapy due to:      Surgery/Procedure: Pt pending OR this date with orthosx for left pilon open reduction internal fixation with possible external fixator device removal      Next Scheduled Treatment: Will check back 3/17/2022    Electronically signed by Razia Marin OT on 3/16/2022 at 8:19 AM

## 2022-03-16 NOTE — BRIEF OP NOTE
Brief Postoperative Note      Patient: Gladis Matthews  YOB: 1945  MRN: [id number]   CSN: [id number]     Date of Procedure: 3/16/2022    Pre-Op Diagnosis: LEFT PILON FRACTURE    Post-Op Diagnosis: Same       Procedure(s):  OPEN REDUCTION INTERNAL FIXATION LEFT PILON and IMN left fibula; 51685    Surgeon(s):  Iris Melendez DO    Assistant:  Resident: Spencer Root DO; Yunier Fajardo DO    Anesthesia: General    Estimated Blood Loss (mL): 250 cc    IVF: 5662 cc    Complications: None    Specimens:   * No specimens in log *    Implants:  Synthes 8 hole VA-LCP anterolateral distal tibia plate  Synthes 9 hole 3.5 mm LCP metaphyseal plate  Acumed 3 mm x 145 mm fibula nail 2  Implant Name Type Inv.  Item Serial No.  Lot No. LRB No. Used Action   GRAFT BONE SUB 30CC L1-10MM CANC CRUSH FRZ DRY - [id number]  GRAFT BONE SUB 30CC L1-10MM CANC CRUSH FRZ DRY [id number] [facility]  Left 1 Implanted   GRAFT BONE SUB 30CC L1-10MM CANC CRUSH FRZ DRY - [id number]  GRAFT BONE SUB 30CC L1-10MM CANC CRUSH FRZ DRY [id number] [facility]  Left 1 Implanted   GRAFT BNE SUB 5CC DEMIN BNE MTRX PTTY GRFTON - [id number]  GRAFT BNE SUB 5CC DEMIN BNE MTRX PTTY GRFTON [id number] [employer] [facility] [facility]  Left 1 Implanted   SCREW BNE L22MM DIA3.5MM SARA S STL ST NONCANNULATED PRAFUL - [id number]  SCREW BNE L22MM DIA3.5MM SARA S STL ST NONCANNULATED PRAFUL  DEPUY [employer]  Left 1 Implanted   SCREW BNE L24MM DIA3.5MM SARA S STL ST NONCANNULATED PRAFUL - [id number]  SCREW BNE L24MM DIA3.5MM SARA S STL ST NONCANNULATED PRAFUL  DEPUY SYNTHES [employer]  Left 2 Implanted   SCREW BNE L26MM DIA3.5MM SARA S STL ST NONCANNULATED PRAFUL - [id number]  SCREW BNE L26MM DIA3.5MM SARA S STL ST NONCANNULATED PRAFUL  DEPUY SY[employer]THES [employer]  Left 1 Implanted   SCREW BNE L28MM DIA3.5MM SARA S STL ST NONCANNULATED PRAFUL - [id number]  SCREW BNE L28MM DIA3.5MM SARA S STL ST NONCANNULATED PRAFUL  DEPUY [employer]TH[employer]  Left 1 Implanted   PLATE BNE R560DI 9 H BILAT MTPHSEAL S STL PRAFUL COMPR LO PROF - [id number]  PLATE BNE [id number] 9 H BILAT MTPHSEAL S STL PRAFUL COMPR LO PROF  DEPUY SY[employer]TH[employer]  Left 1 Implanted   SCREW BNE L95MM DIA3. 5MM PELV SARA S STL ST THRD SM HEX - [id number]  SCREW BNE L95MM DIA3. 5MM PELV SARA S STL ST THRD SM HEX  DEPUY SYNTHES [employer]WD  Left 1 Implanted   ACUMED FIBULA NAIL 2 SYSTEM    [employer]WD [id number] Left 1 Implanted   SCREW BNE L90MM DIA3. 5MM PELV SARA S STL ST THRD SM HEX - [id number]  SCREW BNE L90MM DIA3. 5MM PELV SARA S STL ST THRD SM HEX  DEPUY SYNTHES [employer]  Left 1 Implanted   PLATE BNE F154SF 8 H NONSTERILE L ANTEROMEDIAL DST TIB S - [id number]  PLATE BNE [id number] 8 H NONSTERILE L ANTEROMEDIAL DST TIB S  DEPUY SY[employer]THES [employer]  Left 1 Implanted   SCREW BNE L46MM DIA2.7MM MTPHSEAL S STL ST FULL THRD T8 - [id number]  SCREW BNE L46MM DIA2.7MM MTPHSEAL S STL ST FULL THRD T8  DEPUY SYNTHES [employer]  Left 1 Implanted   SCREW BNE L42MM DIA2.7MM ANK S STL ST JENNIFER ANG PRAFUL FULL THRD - [id number]  SCREW BNE L42MM DIA2.7MM ANK S STL ST JENNIFER ANG PRAFUL FULL THRD  DEPUY SYNTHES [employer]  Left 1 Implanted   SCREW BNE L44MM DIA2.7MM ANK S STL ST JENNIFER ANG PRAFUL FULL THRD - [id number]  SCREW BNE L44MM DIA2.7MM ANK S STL ST JENNIFER ANG PRAFUL FULL THRD  DEPUY SY[employer]THES [employer]  Left 2 Implanted   SCREW BNE L46MM DIA2.7MM ANK S STL ST JENNIFER ANG PRAFUL FULL THRD - [id number]  SCREW BNE L46MM DIA2.7MM ANK S STL ST JENNIFER ANG RPAFUL FULL THRD  DEPUY SYNTHES [employer]WD  Left 2 Implanted   TIP LOC BUSHING &SET SCREW KIT 10MM    [employer]-WD [id number] Left 1 Implanted   3.5MM X16 HEADLESS HEXALOBE SCREW    [employer]WD  Left 1 Implanted   3.5MM X20 HEADLESS HEXALOBE SCREW    [employer]-WD  Left 1 Implanted         Drains:   Urethral Catheter (Active)       [REMOVED] External Urinary Catheter (Removed)       Findings: see op note    Electronically signed by Sharifa Shepherd DO on 3/16/2022 at 7:01 PM

## 2022-03-16 NOTE — ANESTHESIA PROCEDURE NOTES
Peripheral Block    Patient location during procedure: pre-op  Start time: 3/16/2022 10:02 AM  End time: 3/16/2022 10:09 AM  Staffing  Performed: anesthesiologist   Anesthesiologist: Philip Arroyo MD  Preanesthetic Checklist  Completed: patient identified, IV checked, site marked, risks and benefits discussed, surgical consent, monitors and equipment checked, pre-op evaluation, timeout performed, anesthesia consent given, oxygen available and patient being monitored  Peripheral Block  Patient position: supine  Prep: ChloraPrep  Patient monitoring: cardiac monitor, continuous pulse ox, frequent blood pressure checks and IV access  Block type: Sciatic  Laterality: left  Injection technique: single-shot  Guidance: ultrasound guided  Local infiltration: lidocaine  Infiltration strength: 1 %  Dose: 3 mL  Popliteal  Provider prep: mask and sterile gloves  Local infiltration: lidocaine  Needle  Needle gauge: 21 G  Needle length: 10 cm  Needle localization: ultrasound guidance  Test dose: negative  Assessment  Injection assessment: negative aspiration for heme, no paresthesia on injection and local visualized surrounding nerve on ultrasound  Paresthesia pain: none  Slow fractionated injection: yes  Hemodynamics: stable  Additional Notes  U/S 64041.  (1) Under ultrasound guidance, a 21 gauge needle was inserted and placed in close proximity to the popliteal nerve.  (2) Ultrasound was also used to visualize the spread of the anesthetic in close proximity to the nerve being blocked. (3) The nerve appeared anatomically normal, and (4 there were no apparent abnormal pathological findings on the image that were readily visible and related to the nerve being blocked. (5) A permanent ultrasound image was saved in the patient's record.             Medications Administered  Bupivacaine (MARCAINE) PF injection 0.5%, 25 mL  Reason for block: post-op pain management and at surgeon's request

## 2022-03-16 NOTE — PROGRESS NOTES
Progress Note    Patient:  Katherin Can  YOB: 1945     77 y.o. female    Subjective:  Patient seen and examined at bedside this morning. No new complaints or concerns per patient this morning. No acute issues overnight per nursing. Pain well-controlled. Polar care on. Denies: fever/chills, HA, CP, SOB, N/V, dysuria, or numbness/tingling in extremities. Objective:   Vitals:    03/15/22 1904   BP: (!) 142/91   Pulse: 101   Resp: 20   Temp: 98.2 °F (36.8 °C)   SpO2: 94%     Gen: NAD, cooperative   Cardiovascular: Regular rate  Respiratory: no audible wheezing, symmetrical chest expansion   MSK: Left lower extremity: Circular max frame external fixator device on LLE. Polar care along left ankle which was adjusted today to ensure proper cooling function of device around ankle. Ankle and foot remain swollen, but skin able to be wrinkled. Minimally tender to palpation along ankle. Compartments soft and compressible. EHL/FHL gross motor intact. Sural, saphenous, superificial/deep peroneal, and plantar nerve distribution SILT. DP/PT pulses 2+ with BCR; foot warm and perfused. Recent Labs     03/16/22  0427   WBC 7.2   HGB 12.0   HCT 38.9      *   K 4.3   BUN 17   CREATININE 1.16*   GLUCOSE 145*       Meds: See rec for complete list    Impression: 77 y.o. female being seen and evaluated for left pilon fracture s/p external fixator device placement and adjustment with plan for OR today       Plan:     -Plan for OR today for left pilon open reduction internal fixation with possible external fixator device removal   -NWB LLE   -Hemoglobin this AM 12.0  -Complete post op Ancef  -Pain control: Multimodal pain management protocols   -NPO  -Ancef on call to OR   -Consent in chart and operative extremity marked   -Strict Ice (20 min, 1 hour off) and elevation for edema/pain control  -Encourage deep breathing and IS  -DVT ppx: EPC.  Hold chemical anticoagulation until POD#1 -PT/OT  -Please page Ortho with any questions or concerns    Rigo Weaver DO  PGY-2 Orthopedic Surgery  5:26 AM 3/16/2022

## 2022-03-16 NOTE — CARE COORDINATION
Unable to complete initial discharge assessment as patient is off the floor in OR.  1730 Patient remains off the floor in OR.

## 2022-03-16 NOTE — PROGRESS NOTES
Notified Anesthesia Dr. Hoa Ceballos pt -120s. Dr. Hoa Ceballos at bedside evaluating patient. See new orders.

## 2022-03-16 NOTE — FLOWSHEET NOTE
Dr Donaldo Carrion to the bedside, time out performed @ 1002, Pt monitored, 02, popiteal  and Saphenous nerve blocks completed using 0.5% Bupivacaine, 25 mL 0.5% Bupivacaine injected into popiteal site.  15 mL 0.5% Bupivacaine injected into saphenous site,  pt tolerated procedure well,  Site CDI, (see charting)  Versed Given: 1 mg  Fentanyl 50 mcg  Start time: 1002  End Time:  1009

## 2022-03-17 PROBLEM — I25.10 CORONARY ARTERY DISEASE INVOLVING NATIVE CORONARY ARTERY WITHOUT ANGINA PECTORIS: Status: ACTIVE | Noted: 2022-03-17

## 2022-03-17 PROBLEM — Z79.4 TYPE 2 DIABETES MELLITUS WITH HYPERGLYCEMIA, WITH LONG-TERM CURRENT USE OF INSULIN (HCC): Status: ACTIVE | Noted: 2022-03-17

## 2022-03-17 PROBLEM — N18.1 CKD STAGE 1 DUE TO TYPE 1 DIABETES MELLITUS (HCC): Status: ACTIVE | Noted: 2022-03-17

## 2022-03-17 PROBLEM — G47.33 OSA (OBSTRUCTIVE SLEEP APNEA): Status: ACTIVE | Noted: 2022-03-17

## 2022-03-17 PROBLEM — I50.32 CHRONIC DIASTOLIC (CONGESTIVE) HEART FAILURE (HCC): Status: ACTIVE | Noted: 2022-03-17

## 2022-03-17 PROBLEM — E66.01 MORBID OBESITY (HCC): Status: ACTIVE | Noted: 2022-03-17

## 2022-03-17 PROBLEM — E10.22 CKD STAGE 1 DUE TO TYPE 1 DIABETES MELLITUS (HCC): Status: ACTIVE | Noted: 2022-03-17

## 2022-03-17 PROBLEM — E11.22 CKD STAGE 1 DUE TO TYPE 2 DIABETES MELLITUS (HCC): Status: ACTIVE | Noted: 2022-03-17

## 2022-03-17 PROBLEM — E11.65 TYPE 2 DIABETES MELLITUS WITH HYPERGLYCEMIA, WITH LONG-TERM CURRENT USE OF INSULIN (HCC): Status: ACTIVE | Noted: 2022-03-17

## 2022-03-17 PROBLEM — E03.9 ACQUIRED HYPOTHYROIDISM: Status: ACTIVE | Noted: 2022-03-17

## 2022-03-17 LAB
ABSOLUTE EOS #: <0.03 K/UL (ref 0–0.44)
ABSOLUTE IMMATURE GRANULOCYTE: 0.05 K/UL (ref 0–0.3)
ABSOLUTE LYMPH #: 1.32 K/UL (ref 1.1–3.7)
ABSOLUTE MONO #: 1.01 K/UL (ref 0.1–1.2)
ANION GAP SERPL CALCULATED.3IONS-SCNC: 12 MMOL/L (ref 9–17)
ANION GAP SERPL CALCULATED.3IONS-SCNC: 17 MMOL/L (ref 9–17)
BASOPHILS # BLD: 0 % (ref 0–2)
BASOPHILS ABSOLUTE: <0.03 K/UL (ref 0–0.2)
BUN BLDV-MCNC: 24 MG/DL (ref 8–23)
BUN BLDV-MCNC: 30 MG/DL (ref 8–23)
CALCIUM SERPL-MCNC: 8.3 MG/DL (ref 8.6–10.4)
CALCIUM SERPL-MCNC: 8.4 MG/DL (ref 8.6–10.4)
CHLORIDE BLD-SCNC: 98 MMOL/L (ref 98–107)
CHLORIDE BLD-SCNC: 99 MMOL/L (ref 98–107)
CO2: 15 MMOL/L (ref 20–31)
CO2: 20 MMOL/L (ref 20–31)
CREAT SERPL-MCNC: 1.27 MG/DL (ref 0.5–0.9)
CREAT SERPL-MCNC: 1.33 MG/DL (ref 0.5–0.9)
EOSINOPHILS RELATIVE PERCENT: 0 % (ref 1–4)
GFR AFRICAN AMERICAN: 47 ML/MIN
GFR AFRICAN AMERICAN: 50 ML/MIN
GFR NON-AFRICAN AMERICAN: 39 ML/MIN
GFR NON-AFRICAN AMERICAN: 41 ML/MIN
GFR SERPL CREATININE-BSD FRML MDRD: ABNORMAL ML/MIN/{1.73_M2}
GFR SERPL CREATININE-BSD FRML MDRD: ABNORMAL ML/MIN/{1.73_M2}
GLUCOSE BLD-MCNC: 175 MG/DL (ref 65–105)
GLUCOSE BLD-MCNC: 200 MG/DL (ref 65–105)
GLUCOSE BLD-MCNC: 221 MG/DL (ref 70–99)
GLUCOSE BLD-MCNC: 310 MG/DL (ref 65–105)
GLUCOSE BLD-MCNC: 343 MG/DL (ref 70–99)
HCT VFR BLD CALC: 36.9 % (ref 36.3–47.1)
HEMOGLOBIN: 11.1 G/DL (ref 11.9–15.1)
IMMATURE GRANULOCYTES: 1 %
LYMPHOCYTES # BLD: 13 % (ref 24–43)
MCH RBC QN AUTO: 26.9 PG (ref 25.2–33.5)
MCHC RBC AUTO-ENTMCNC: 30.1 G/DL (ref 28.4–34.8)
MCV RBC AUTO: 89.3 FL (ref 82.6–102.9)
MONOCYTES # BLD: 10 % (ref 3–12)
NRBC AUTOMATED: 0 PER 100 WBC
PDW BLD-RTO: 16 % (ref 11.8–14.4)
PLATELET # BLD: 290 K/UL (ref 138–453)
PMV BLD AUTO: 9.2 FL (ref 8.1–13.5)
POTASSIUM SERPL-SCNC: 4.3 MMOL/L (ref 3.7–5.3)
POTASSIUM SERPL-SCNC: 4.7 MMOL/L (ref 3.7–5.3)
RBC # BLD: 4.13 M/UL (ref 3.95–5.11)
RBC # BLD: ABNORMAL 10*6/UL
SEG NEUTROPHILS: 77 % (ref 36–65)
SEGMENTED NEUTROPHILS ABSOLUTE COUNT: 8.04 K/UL (ref 1.5–8.1)
SODIUM BLD-SCNC: 130 MMOL/L (ref 135–144)
SODIUM BLD-SCNC: 131 MMOL/L (ref 135–144)
WBC # BLD: 10.4 K/UL (ref 3.5–11.3)

## 2022-03-17 PROCEDURE — 97166 OT EVAL MOD COMPLEX 45 MIN: CPT

## 2022-03-17 PROCEDURE — 6370000000 HC RX 637 (ALT 250 FOR IP): Performed by: STUDENT IN AN ORGANIZED HEALTH CARE EDUCATION/TRAINING PROGRAM

## 2022-03-17 PROCEDURE — 97162 PT EVAL MOD COMPLEX 30 MIN: CPT

## 2022-03-17 PROCEDURE — 2580000003 HC RX 258: Performed by: ORTHOPAEDIC SURGERY

## 2022-03-17 PROCEDURE — 6360000002 HC RX W HCPCS: Performed by: ORTHOPAEDIC SURGERY

## 2022-03-17 PROCEDURE — 6370000000 HC RX 637 (ALT 250 FOR IP): Performed by: NURSE PRACTITIONER

## 2022-03-17 PROCEDURE — 97530 THERAPEUTIC ACTIVITIES: CPT

## 2022-03-17 PROCEDURE — 97535 SELF CARE MNGMENT TRAINING: CPT

## 2022-03-17 PROCEDURE — 82947 ASSAY GLUCOSE BLOOD QUANT: CPT

## 2022-03-17 PROCEDURE — 80048 BASIC METABOLIC PNL TOTAL CA: CPT

## 2022-03-17 PROCEDURE — 6370000000 HC RX 637 (ALT 250 FOR IP): Performed by: ORTHOPAEDIC SURGERY

## 2022-03-17 PROCEDURE — 85025 COMPLETE CBC W/AUTO DIFF WBC: CPT

## 2022-03-17 PROCEDURE — 36415 COLL VENOUS BLD VENIPUNCTURE: CPT

## 2022-03-17 PROCEDURE — 1200000000 HC SEMI PRIVATE

## 2022-03-17 PROCEDURE — 6360000002 HC RX W HCPCS: Performed by: STUDENT IN AN ORGANIZED HEALTH CARE EDUCATION/TRAINING PROGRAM

## 2022-03-17 PROCEDURE — 99222 1ST HOSP IP/OBS MODERATE 55: CPT | Performed by: STUDENT IN AN ORGANIZED HEALTH CARE EDUCATION/TRAINING PROGRAM

## 2022-03-17 RX ORDER — NICOTINE POLACRILEX 4 MG
15 LOZENGE BUCCAL PRN
Status: DISCONTINUED | OUTPATIENT
Start: 2022-03-17 | End: 2022-03-18 | Stop reason: HOSPADM

## 2022-03-17 RX ORDER — DOCUSATE SODIUM 100 MG/1
100 CAPSULE, LIQUID FILLED ORAL 2 TIMES DAILY
Qty: 20 CAPSULE | Refills: 0 | Status: SHIPPED | OUTPATIENT
Start: 2022-03-17 | End: 2022-04-26 | Stop reason: ALTCHOICE

## 2022-03-17 RX ORDER — GABAPENTIN 100 MG/1
100 CAPSULE ORAL 3 TIMES DAILY
Qty: 42 CAPSULE | Refills: 0 | Status: SHIPPED | OUTPATIENT
Start: 2022-03-17 | End: 2022-04-26 | Stop reason: ALTCHOICE

## 2022-03-17 RX ORDER — DEXTROSE MONOHYDRATE 25 G/50ML
12.5 INJECTION, SOLUTION INTRAVENOUS PRN
Status: DISCONTINUED | OUTPATIENT
Start: 2022-03-17 | End: 2022-03-18 | Stop reason: HOSPADM

## 2022-03-17 RX ORDER — OXYCODONE HYDROCHLORIDE 5 MG/1
5-10 TABLET ORAL
Qty: 30 TABLET | Refills: 0 | Status: SHIPPED | OUTPATIENT
Start: 2022-03-17 | End: 2022-03-24

## 2022-03-17 RX ORDER — INSULIN GLARGINE 100 [IU]/ML
70 INJECTION, SOLUTION SUBCUTANEOUS NIGHTLY
Status: DISCONTINUED | OUTPATIENT
Start: 2022-03-17 | End: 2022-03-18 | Stop reason: HOSPADM

## 2022-03-17 RX ORDER — NAPROXEN 500 MG/1
500 TABLET ORAL 2 TIMES DAILY WITH MEALS
Qty: 28 TABLET | Refills: 0 | Status: SHIPPED | OUTPATIENT
Start: 2022-03-17

## 2022-03-17 RX ORDER — DEXTROSE MONOHYDRATE 50 MG/ML
100 INJECTION, SOLUTION INTRAVENOUS PRN
Status: DISCONTINUED | OUTPATIENT
Start: 2022-03-17 | End: 2022-03-18 | Stop reason: HOSPADM

## 2022-03-17 RX ORDER — ACETAMINOPHEN 500 MG
1000 TABLET ORAL EVERY 6 HOURS PRN
Qty: 112 TABLET | Refills: 0 | Status: SHIPPED | OUTPATIENT
Start: 2022-03-17 | End: 2022-04-26 | Stop reason: ALTCHOICE

## 2022-03-17 RX ORDER — INSULIN GLARGINE 100 [IU]/ML
80 INJECTION, SOLUTION SUBCUTANEOUS DAILY
Status: DISCONTINUED | OUTPATIENT
Start: 2022-03-17 | End: 2022-03-18 | Stop reason: HOSPADM

## 2022-03-17 RX ORDER — GLUCAGON 1 MG/ML
1 KIT INJECTION PRN
Status: DISCONTINUED | OUTPATIENT
Start: 2022-03-17 | End: 2022-03-18 | Stop reason: HOSPADM

## 2022-03-17 RX ADMIN — GABAPENTIN 300 MG: 300 CAPSULE ORAL at 12:10

## 2022-03-17 RX ADMIN — SACUBITRIL AND VALSARTAN 1 TABLET: 49; 51 TABLET, FILM COATED ORAL at 09:46

## 2022-03-17 RX ADMIN — METHOCARBAMOL TABLETS 750 MG: 750 TABLET, COATED ORAL at 09:47

## 2022-03-17 RX ADMIN — BRIMONIDINE TARTRATE 1 DROP: 2 SOLUTION OPHTHALMIC at 09:46

## 2022-03-17 RX ADMIN — DEXTROSE MONOHYDRATE 2000 MG: 50 INJECTION, SOLUTION INTRAVENOUS at 01:51

## 2022-03-17 RX ADMIN — ACETAMINOPHEN 1000 MG: 500 TABLET ORAL at 04:17

## 2022-03-17 RX ADMIN — SACUBITRIL AND VALSARTAN 1 TABLET: 49; 51 TABLET, FILM COATED ORAL at 21:07

## 2022-03-17 RX ADMIN — Medication 81 MG: at 09:46

## 2022-03-17 RX ADMIN — INSULIN LISPRO 2 UNITS: 100 INJECTION, SOLUTION INTRAVENOUS; SUBCUTANEOUS at 21:34

## 2022-03-17 RX ADMIN — METHOCARBAMOL TABLETS 750 MG: 750 TABLET, COATED ORAL at 12:10

## 2022-03-17 RX ADMIN — ACETAMINOPHEN 1000 MG: 500 TABLET ORAL at 15:20

## 2022-03-17 RX ADMIN — INSULIN GLARGINE 80 UNITS: 100 INJECTION, SOLUTION SUBCUTANEOUS at 13:41

## 2022-03-17 RX ADMIN — ATORVASTATIN CALCIUM 80 MG: 80 TABLET, FILM COATED ORAL at 09:46

## 2022-03-17 RX ADMIN — SULFAMETHOXAZOLE AND TRIMETHOPRIM 1 TABLET: 800; 160 TABLET ORAL at 09:47

## 2022-03-17 RX ADMIN — GABAPENTIN 300 MG: 300 CAPSULE ORAL at 21:08

## 2022-03-17 RX ADMIN — ACETAMINOPHEN 1000 MG: 500 TABLET ORAL at 09:47

## 2022-03-17 RX ADMIN — CITALOPRAM 40 MG: 20 TABLET, FILM COATED ORAL at 09:46

## 2022-03-17 RX ADMIN — INSULIN LISPRO 6 UNITS: 100 INJECTION, SOLUTION INTRAVENOUS; SUBCUTANEOUS at 17:27

## 2022-03-17 RX ADMIN — METHOCARBAMOL TABLETS 750 MG: 750 TABLET, COATED ORAL at 17:27

## 2022-03-17 RX ADMIN — LEVOTHYROXINE SODIUM 125 MCG: 125 TABLET ORAL at 09:47

## 2022-03-17 RX ADMIN — INSULIN GLARGINE 70 UNITS: 100 INJECTION, SOLUTION SUBCUTANEOUS at 21:35

## 2022-03-17 RX ADMIN — DEXTROSE MONOHYDRATE 2000 MG: 50 INJECTION, SOLUTION INTRAVENOUS at 09:57

## 2022-03-17 RX ADMIN — BUSPIRONE HYDROCHLORIDE 7.5 MG: 5 TABLET ORAL at 21:08

## 2022-03-17 RX ADMIN — DOCUSATE SODIUM 100 MG: 100 CAPSULE ORAL at 09:46

## 2022-03-17 RX ADMIN — PANTOPRAZOLE SODIUM 40 MG: 40 TABLET, DELAYED RELEASE ORAL at 09:47

## 2022-03-17 RX ADMIN — METHOCARBAMOL TABLETS 750 MG: 750 TABLET, COATED ORAL at 21:08

## 2022-03-17 RX ADMIN — BRIMONIDINE TARTRATE 1 DROP: 2 SOLUTION OPHTHALMIC at 21:08

## 2022-03-17 RX ADMIN — POLYETHYLENE GLYCOL 3350 17 G: 17 POWDER, FOR SOLUTION ORAL at 09:53

## 2022-03-17 RX ADMIN — DICYCLOMINE HYDROCHLORIDE 10 MG: 10 CAPSULE ORAL at 17:27

## 2022-03-17 RX ADMIN — DICYCLOMINE HYDROCHLORIDE 10 MG: 10 CAPSULE ORAL at 09:46

## 2022-03-17 RX ADMIN — INSULIN LISPRO 12 UNITS: 100 INJECTION, SOLUTION INTRAVENOUS; SUBCUTANEOUS at 12:10

## 2022-03-17 RX ADMIN — GABAPENTIN 300 MG: 300 CAPSULE ORAL at 04:17

## 2022-03-17 RX ADMIN — DICYCLOMINE HYDROCHLORIDE 10 MG: 10 CAPSULE ORAL at 12:10

## 2022-03-17 RX ADMIN — BUSPIRONE HYDROCHLORIDE 7.5 MG: 5 TABLET ORAL at 09:46

## 2022-03-17 RX ADMIN — KETOROLAC TROMETHAMINE 15 MG: 15 INJECTION, SOLUTION INTRAMUSCULAR; INTRAVENOUS at 09:46

## 2022-03-17 RX ADMIN — OXYCODONE 10 MG: 5 TABLET ORAL at 19:10

## 2022-03-17 RX ADMIN — ACETAMINOPHEN 1000 MG: 500 TABLET ORAL at 21:11

## 2022-03-17 RX ADMIN — OXYCODONE 10 MG: 5 TABLET ORAL at 01:56

## 2022-03-17 RX ADMIN — KETOROLAC TROMETHAMINE 15 MG: 15 INJECTION, SOLUTION INTRAMUSCULAR; INTRAVENOUS at 04:17

## 2022-03-17 ASSESSMENT — PAIN DESCRIPTION - ORIENTATION
ORIENTATION: LEFT

## 2022-03-17 ASSESSMENT — PAIN DESCRIPTION - LOCATION
LOCATION: ANKLE
LOCATION: ANKLE
LOCATION: LEG
LOCATION: LEG

## 2022-03-17 ASSESSMENT — PAIN DESCRIPTION - PROGRESSION: CLINICAL_PROGRESSION: NOT CHANGED

## 2022-03-17 ASSESSMENT — PAIN SCALES - GENERAL
PAINLEVEL_OUTOF10: 6
PAINLEVEL_OUTOF10: 7
PAINLEVEL_OUTOF10: 8
PAINLEVEL_OUTOF10: 5
PAINLEVEL_OUTOF10: 7
PAINLEVEL_OUTOF10: 6
PAINLEVEL_OUTOF10: 7
PAINLEVEL_OUTOF10: 7
PAINLEVEL_OUTOF10: 5
PAINLEVEL_OUTOF10: 8

## 2022-03-17 ASSESSMENT — PAIN DESCRIPTION - ONSET: ONSET: ON-GOING

## 2022-03-17 ASSESSMENT — PAIN DESCRIPTION - PAIN TYPE
TYPE: ACUTE PAIN
TYPE: ACUTE PAIN;SURGICAL PAIN
TYPE: ACUTE PAIN;SURGICAL PAIN

## 2022-03-17 ASSESSMENT — PAIN DESCRIPTION - DESCRIPTORS
DESCRIPTORS: DISCOMFORT
DESCRIPTORS: ACHING;DISCOMFORT

## 2022-03-17 ASSESSMENT — PAIN DESCRIPTION - FREQUENCY
FREQUENCY: CONTINUOUS
FREQUENCY: CONTINUOUS
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
## 2022-03-17 NOTE — PLAN OF CARE
Problem: Falls - Risk of:  Goal: Will remain free from falls  Description: Will remain free from falls  3/17/2022 1613 by Harman Ro RN  Outcome: Met This Shift  3/17/2022 1613 by Harman Ro RN  Outcome: Met This Shift  3/17/2022 0503 by Luz Minor RN  Outcome: Ongoing  Goal: Absence of physical injury  Description: Absence of physical injury  3/17/2022 1613 by Harman Ro RN  Outcome: Met This Shift  3/17/2022 1613 by Harman Ro RN  Outcome: Met This Shift  3/17/2022 0503 by Luz Minor RN  Outcome: Ongoing     Problem: Pain:  Goal: Pain level will decrease  Description: Pain level will decrease  3/17/2022 1613 by Harman Ro RN  Outcome: Ongoing  3/17/2022 1613 by Harman Ro RN  Outcome: Ongoing  3/17/2022 0503 by Luz Minor RN  Outcome: Ongoing  Goal: Control of acute pain  Description: Control of acute pain  3/17/2022 1613 by Harman Ro RN  Outcome: Ongoing  3/17/2022 1613 by Harman Ro RN  Outcome: Ongoing  3/17/2022 0503 by Luz Minor RN  Outcome: Ongoing  Goal: Control of chronic pain  Description: Control of chronic pain  3/17/2022 1613 by Harman Ro RN  Outcome: Ongoing  3/17/2022 1613 by Harman Ro RN  Outcome: Ongoing  3/17/2022 0503 by Luz Minor RN  Outcome: Ongoing     Problem: Musculor/Skeletal Functional Status  Goal: Highest potential functional level  3/17/2022 1613 by Harman Ro RN  Outcome: Ongoing  3/17/2022 1613 by Harman Ro RN  Outcome: Ongoing

## 2022-03-17 NOTE — PROGRESS NOTES
Progress Note    Patient:  Tejas Barfield  YOB: 1945     77 y.o. female    Subjective:  Patient seen and examined at bedside this morning. No new complaints or concerns per patient this morning. States she still has numbness and inability to move her toes secondary to nerve block yesterday. Pain medication adjusted overnight but otherwise doing well. Denies: fever/chills, HA, CP, SOB, N/V, dysuria. No BM/is passing flatus. Is tolerating diet. Has been urinating without difficulty. Using polar care for swelling. Objective:   Vitals:    03/17/22 0418   BP: 125/76   Pulse: 94   Resp: 18   Temp: 98.1 °F (36.7 °C)   SpO2: 95%     Gen: NAD, cooperative  Cardiovascular: Regular rate  Respiratory: no audible wheezing, symmetrical chest expansion   MSK: Left lower extremity: Circular external fixator device remains in place. Polar care on over ankle. Minimal swelling to left foot. Dressings c/d/i. Compartments soft and compressible. Unable to move toes or feel light touch distally along entire foot secondary to block. DP pulse 2+ with BCR; foot warm and perfused. Recent Labs     03/16/22  0427   WBC 7.2   HGB 12.0   HCT 38.9      *   K 4.3   BUN 17   CREATININE 1.16*   GLUCOSE 145*       Meds: Aspirin, ancef    See rec for complete list    Impression: 77 y.o. female being seen and evaluated POD#1 from left pilon open reduction internal fixation with left fibula IMN       Plan:     -Maintain dressings and polar care.    -NWB LLE   -BMP/CBC ordered this morning   -Complete post op Ancef  -Pain control: multimodal pain management protocols   -Tolerating PO intake well  -Voiding Well  -Ice (20 min, 1 hour off) and elevation for edema/pain control  -Encourage deep breathing and IS  -DVT ppx: Aspirin and EPC  -PT/OT to evaluate and treat   -Will assess patient pain levels and determine plan for discharge based off physical therapy assessment   -Please page Ortho with any questions or concerns    Enrique Winter, DO  PGY-2 Orthopedic Surgery  5:48 AM 3/17/2022
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
## 2022-03-18 VITALS
HEIGHT: 63 IN | HEART RATE: 67 BPM | SYSTOLIC BLOOD PRESSURE: 109 MMHG | RESPIRATION RATE: 16 BRPM | OXYGEN SATURATION: 95 % | TEMPERATURE: 97.9 F | DIASTOLIC BLOOD PRESSURE: 64 MMHG | BODY MASS INDEX: 40.75 KG/M2 | WEIGHT: 230 LBS

## 2022-03-18 LAB
GLUCOSE BLD-MCNC: 130 MG/DL (ref 65–105)
GLUCOSE BLD-MCNC: 135 MG/DL (ref 65–105)
GLUCOSE BLD-MCNC: 174 MG/DL (ref 65–105)
TSH SERPL DL<=0.05 MIU/L-ACNC: 4.24 MIU/L (ref 0.3–5)

## 2022-03-18 PROCEDURE — 84443 ASSAY THYROID STIM HORMONE: CPT

## 2022-03-18 PROCEDURE — 6370000000 HC RX 637 (ALT 250 FOR IP): Performed by: STUDENT IN AN ORGANIZED HEALTH CARE EDUCATION/TRAINING PROGRAM

## 2022-03-18 PROCEDURE — 6370000000 HC RX 637 (ALT 250 FOR IP): Performed by: ORTHOPAEDIC SURGERY

## 2022-03-18 PROCEDURE — 99232 SBSQ HOSP IP/OBS MODERATE 35: CPT | Performed by: HOSPITALIST

## 2022-03-18 PROCEDURE — 82947 ASSAY GLUCOSE BLOOD QUANT: CPT

## 2022-03-18 PROCEDURE — 2580000003 HC RX 258: Performed by: ORTHOPAEDIC SURGERY

## 2022-03-18 PROCEDURE — 36415 COLL VENOUS BLD VENIPUNCTURE: CPT

## 2022-03-18 PROCEDURE — 97110 THERAPEUTIC EXERCISES: CPT

## 2022-03-18 RX ADMIN — LEVOTHYROXINE SODIUM 125 MCG: 125 TABLET ORAL at 08:08

## 2022-03-18 RX ADMIN — DICYCLOMINE HYDROCHLORIDE 10 MG: 10 CAPSULE ORAL at 07:57

## 2022-03-18 RX ADMIN — INSULIN GLARGINE 80 UNITS: 100 INJECTION, SOLUTION SUBCUTANEOUS at 09:10

## 2022-03-18 RX ADMIN — BRIMONIDINE TARTRATE 1 DROP: 2 SOLUTION OPHTHALMIC at 09:07

## 2022-03-18 RX ADMIN — DOCUSATE SODIUM 100 MG: 100 CAPSULE ORAL at 09:09

## 2022-03-18 RX ADMIN — ACETAMINOPHEN 1000 MG: 500 TABLET ORAL at 16:05

## 2022-03-18 RX ADMIN — CITALOPRAM 40 MG: 20 TABLET, FILM COATED ORAL at 09:10

## 2022-03-18 RX ADMIN — GABAPENTIN 300 MG: 300 CAPSULE ORAL at 06:35

## 2022-03-18 RX ADMIN — OXYCODONE 10 MG: 5 TABLET ORAL at 17:47

## 2022-03-18 RX ADMIN — SACUBITRIL AND VALSARTAN 1 TABLET: 49; 51 TABLET, FILM COATED ORAL at 09:14

## 2022-03-18 RX ADMIN — ACETAMINOPHEN 1000 MG: 500 TABLET ORAL at 09:09

## 2022-03-18 RX ADMIN — SODIUM CHLORIDE, PRESERVATIVE FREE 10 ML: 5 INJECTION INTRAVENOUS at 09:14

## 2022-03-18 RX ADMIN — GABAPENTIN 300 MG: 300 CAPSULE ORAL at 14:17

## 2022-03-18 RX ADMIN — DICYCLOMINE HYDROCHLORIDE 10 MG: 10 CAPSULE ORAL at 12:44

## 2022-03-18 RX ADMIN — METHOCARBAMOL TABLETS 750 MG: 750 TABLET, COATED ORAL at 09:09

## 2022-03-18 RX ADMIN — PANTOPRAZOLE SODIUM 40 MG: 40 TABLET, DELAYED RELEASE ORAL at 08:08

## 2022-03-18 RX ADMIN — DICYCLOMINE HYDROCHLORIDE 10 MG: 10 CAPSULE ORAL at 16:05

## 2022-03-18 RX ADMIN — Medication 81 MG: at 09:09

## 2022-03-18 RX ADMIN — OXYCODONE 10 MG: 5 TABLET ORAL at 14:17

## 2022-03-18 RX ADMIN — ATORVASTATIN CALCIUM 80 MG: 80 TABLET, FILM COATED ORAL at 09:38

## 2022-03-18 RX ADMIN — METHOCARBAMOL TABLETS 750 MG: 750 TABLET, COATED ORAL at 12:44

## 2022-03-18 RX ADMIN — METHOCARBAMOL TABLETS 750 MG: 750 TABLET, COATED ORAL at 17:47

## 2022-03-18 RX ADMIN — OXYCODONE 10 MG: 5 TABLET ORAL at 01:42

## 2022-03-18 RX ADMIN — BUSPIRONE HYDROCHLORIDE 7.5 MG: 5 TABLET ORAL at 09:09

## 2022-03-18 RX ADMIN — OXYCODONE 10 MG: 5 TABLET ORAL at 07:57

## 2022-03-18 ASSESSMENT — PAIN SCALES - GENERAL
PAINLEVEL_OUTOF10: 5
PAINLEVEL_OUTOF10: 9
PAINLEVEL_OUTOF10: 5
PAINLEVEL_OUTOF10: 5
PAINLEVEL_OUTOF10: 7
PAINLEVEL_OUTOF10: 7
PAINLEVEL_OUTOF10: 5
PAINLEVEL_OUTOF10: 6
PAINLEVEL_OUTOF10: 7
PAINLEVEL_OUTOF10: 7
PAINLEVEL_OUTOF10: 6

## 2022-03-18 ASSESSMENT — PAIN DESCRIPTION - PROGRESSION
CLINICAL_PROGRESSION: NOT CHANGED

## 2022-03-18 ASSESSMENT — PAIN DESCRIPTION - LOCATION: LOCATION: ANKLE

## 2022-03-18 ASSESSMENT — PAIN DESCRIPTION - DESCRIPTORS: DESCRIPTORS: DISCOMFORT

## 2022-03-18 ASSESSMENT — PAIN DESCRIPTION - FREQUENCY: FREQUENCY: CONTINUOUS

## 2022-03-18 ASSESSMENT — PAIN DESCRIPTION - PAIN TYPE: TYPE: ACUTE PAIN;SURGICAL PAIN

## 2022-03-18 ASSESSMENT — PAIN DESCRIPTION - ORIENTATION: ORIENTATION: LEFT
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
## 2022-03-18 NOTE — ANESTHESIA POSTPROCEDURE EVALUATION
Department of Anesthesiology  Postprocedure Note    Patient: Rohini Segura  MRN: [id number]  [place]: 1945  Date of evaluation: 3/17/2022  Time:  8:11 PM     Procedure Summary     Date: 03/16/22 Room / Location: [address]    Anesthesia Start: 1301 Anesthesia Stop: 1908    Procedure: OPEN REDUCTION INTERNAL FIXATION  PILON FRACTURE INTRAMEDULLARY NAIL LEFT FIBULA  C ARM, SYNTHES,ACUMED (Left Leg Lower) Diagnosis: (LEFT PILON FRACTURE)    Surgeons: Marti Baldwin DO Responsible Provider: Adrianne Grant MD    Anesthesia Type: regional, general ASA Status: 3          Anesthesia Type: regional, general    Saad Phase I: Saad Score: 8    Saad Phase II:      Last vitals: Reviewed and per EMR flowsheets.        Anesthesia Post Evaluation    Patient location during evaluation: PACU  Patient participation: complete - patient participated  Level of consciousness: awake and alert  Airway patency: patent  Nausea & Vomiting: no nausea and no vomiting  Complications: no  Cardiovascular status: blood pressure returned to baseline  Respiratory status: acceptable  Hydration status: euvolemic
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
## 2022-03-18 NOTE — PLAN OF CARE
Problem: Pain:  Goal: Pain level will decrease  Description: Pain level will decrease  3/18/2022 0316 by Sadia Mendiola RN  Outcome: Ongoing  3/17/2022 1613 by Daysi Martini RN  Outcome: Ongoing  Goal: Control of acute pain  Description: Control of acute pain  3/18/2022 0316 by Sadia Mendiola RN  Outcome: Ongoing  3/17/2022 1613 by Daysi Martini RN  Outcome: Ongoing  Goal: Control of chronic pain  Description: Control of chronic pain  3/18/2022 0316 by Sadia Mendiola RN  Outcome: Ongoing  3/17/2022 1613 by Daysi Martini RN  Outcome: Ongoing     Problem: Falls - Risk of:  Goal: Will remain free from falls  Description: Will remain free from falls  3/18/2022 0316 by Sadia Mendiola RN  Outcome: Ongoing  3/17/2022 1613 by Daysi Martini RN  Outcome: Met This Shift  Goal: Absence of physical injury  Description: Absence of physical injury  3/18/2022 0316 by Sadia Mendiola RN  Outcome: Ongoing  3/17/2022 1613 by Daysi Martini RN  Outcome: Met This Shift     Problem: Musculor/Skeletal Functional Status  Goal: Highest potential functional level  3/18/2022 0316 by Sadia Mendiola RN  Outcome: Ongoing  3/17/2022 1613 by Daysi Martini RN  Outcome: Ongoing     Problem: Skin Integrity:  Goal: Will show no infection signs and symptoms  Description: Will show no infection signs and symptoms  Outcome: Ongoing  Goal: Absence of new skin breakdown  Description: Absence of new skin breakdown  Outcome: Ongoing

## 2022-03-18 NOTE — PROGRESS NOTES
CLINICAL PHARMACY NOTE: MEDS TO BEDS    Total # of Prescriptions Filled: 5   The following medications were delivered to the patient:  · Naproxen 500 mg tab  · Docusate sodium 100 mg cap  · Oxycodone 5 mg tab  · Acetaminophen  mg tab  · Gabapentin 100 mg cap    Additional Documentation:Medications delivered to the patient in her room 236 @ 12:37pm , called patients daughter to get clover payment over the phone.

## 2022-03-18 NOTE — PROGRESS NOTES
Progress Note    Patient:  Rohini Segura  YOB: 1945     77 y.o. female    Subjective:  Patient seen and examined at bedside this morning. No new complaints or concerns per patient this morning. No acute issues overnight per nursing. Nerve block has worn off. Pain well-controlled. Denies: fever/chills, HA, CP, SOB, N/V, dysuria, or numbness/tingling in extremities. + BM  Urinating well. Tolerating oral intake. Objective:   Vitals:    03/18/22 0426   BP: (!) 104/53   Pulse: 53   Resp: 18   Temp:    SpO2: 95%     Gen: NAD, cooperative   Cardiovascular: Regular rate  Respiratory: no audible wheezing, symmetrical chest expansion   MSK: Left lower extremity: Circular external fixator device remains in place. Polar care and dressings on, which were removed today fro evaluation of incisions. Incisions well approximated with no surrounding erythema. Mild swelling diffusely along the ankle. Compartments soft and compressible. Able to move toes today. Sensation for sural/saph/SPN/DPN/plantar distribution intact to light touch. DP pulse 1+. Recent Labs     03/17/22  0813 03/17/22  0813 03/17/22 1954   WBC 10.4  --   --    HGB 11.1*  --   --    HCT 36.9  --   --      --   --    *   < > 130*   K 4.7   < > 4.3   BUN 24*   < > 30*   CREATININE 1.27*   < > 1.33*   GLUCOSE 343*   < > 221*    < > = values in this interval not displayed. Meds: Aspiin   See rec for complete list    Impression: 77 y.o. female being seen and evaluated POD2 from left pilon open reduction internal fixation with left fibula IMN       Plan:     -Dressings changed today. OK for daily dressing changes.    -OK for discharge today   -NWB LLE   -Post-op Hb 11.1 yesterday   -Completed post op antibiotics  -Pain control: multimodal pain management protocol   -Tolerating PO intake well  -Voiding Well  -Ice (20 min, 1 hour off) and elevation for edema/pain control  -Encourage deep breathing and IS  -DVT ppx: Aspiin and EPC  -PT/OT to evaluate and treat   -Discharge today and follow up with Dr. Alton Prabhakar 10-14 days after surgery   -Please page Ortho with any questions or concerns    Barbie Trinidad DO  PGY-2 Orthopedic Surgery  5:45 AM 3/18/2022

## 2022-03-18 NOTE — DISCHARGE INSTR - COC
Continuity of Care Form    Patient Name: Ranjit Perry   :    MRN:  [id number]    Admit date:  3/15/2022  Discharge date:  ***    Code Status Order: Full Code   Advance Directives:   Advance Care Flowsheet Documentation       Date/Time Healthcare Directive Type of Healthcare Directive Copy in [address] Agent's Name Healthcare Agent's Phone Number    22 [phone] No, patient does not have an advance directive for healthcare treatment -- -- -- -- --            Admitting Physician:  Lorie Kumar DO  PCP: SHONA Rooney - CNP    Discharging Nurse: [facility] Unit/Room#: 0236/0236-01  Discharging Unit Phone Number: ***    Emergency Contact:   Extended Emergency Contact Information  Primary Emergency Contact: [address] Phone: [phone]  Relation: Child  Secondary Emergency Contact: Domencio May, [address] Phone: [phone]  Relation: Child  Preferred language: English   needed? No    Past Surgical History:  Past Surgical History:   Procedure Laterality Date    ANKLE FRACTURE SURGERY Left 02/10/2022    CLOSED REDUCTION LEFT PILON   APPLICATION OF RING EXTERNAL FIXATOR performed by Lorie Kumar DO at [address] Left 3/2/2022    REVISION OF LEFT ANKLE EXTERNAL FIXATOR ( SYNTHES, PRE OP NERVE BLOCK, 3080, SUPINE, C ARM, SYNTHES MAX FRAME) performed by Lorie Kumar DO at [address]  10/2013    1 stent placed    CARDIAC CATHETERIZATION  2014    1 stent placed    CARDIAC CATHETERIZATION  2016    CARDIAC CATHETERIZATION  10/18/2021     chronic diastolic heart failure, at [address]  10/2021    Dr. Chung Sheehan at [facility]. ..sent to [address] [place] [address] Right     CATARACT REMOVAL WITH IMPLANT Bilateral ?     CHOLECYSTECTOMY      COLONOSCOPY      has had several, WNL    CYST REMOVAL Left     baker cyst , long  ago    HYSTERECTOMY 1975    JOINT REPLACEMENT      LEG SURGERY Left 03/16/2022    ORIF Pilon fracture intramedullary nail fibula     LEG SURGERY Left 3/16/2022    OPEN REDUCTION INTERNAL FIXATION  PILON FRACTURE INTRAMEDULLARY NAIL LEFT FIBULA  C ARM, SYNTHES,ACUMED performed by Citlalli Conde DO at [facility] Left 1967    cyst and ovary removal while 8.5 months pregnant    TONSILLECTOMY      TOTAL KNEE ARTHROPLASTY Left ?2007    TOTAL KNEE ARTHROPLASTY Right 2006? Immunization History: There is no immunization history on file for this patient.     Active Problems:  Patient Active Problem List   Diagnosis Code    Tibia/fibula fracture, shaft, unspecified laterality, closed, initial encounter S82.209A, S82.409A    Closed displaced pilon fracture of left tibia S82.872A    Post-op pain G89.18    Closed left ankle fracture, initial encounter S82.892A    Closed left ankle fracture, sequela S82.892S    Chronic diastolic (congestive) heart failure ([facility]) I50.32    LEE (obstructive sleep apnea) G47.33    CKD stage 1 due to type 2 diabetes mellitus ([facility]) E11.22, N18.1    Coronary artery disease involving native coronary artery without angina pectoris I25.10    Morbid obesity ([facility] E66.01    Acquired hypothyroidism E03.9    Type 2 diabetes mellitus with hyperglycemia, with long-term current use of insulin ([facility]) E11.65, Z79.4       Isolation/Infection:   Isolation            No Isolation          Patient Infection Status       Infection Onset Added Last Indicated Last Indicated By Review Planned Expiration Resolved Resolved By    None active    Resolved    COVID-19 (Rule Out) 02/09/22 02/09/22 02/09/22 COVID-19, Rapid (Ordered)   02/09/22 Rule-Out Test Resulted            Nurse Assessment:  Last Vital Signs: /64   Pulse 67   Temp 97.9 °F (36.6 °C) (Oral)   Resp 16   Ht 5' 3\" (1.6 m)   Wt 230 lb (104.3 kg)   SpO2 95%   BMI 40.74 kg/m²     Last documented pain score (0-10 scale): Pain Level: 7  Last Weight: Wt Readings from Last 1 Encounters:   22 230 lb (104.3 kg)     Mental Status:  {IP PT MENTAL STATUS:}    IV Access:  { KEVIN IV ACCESS:548670010}    Nursing Mobility/ADLs:  Walking   {CHP DME DACH:638906323}  Transfer  {CHP DME WUIL:157434637}  Bathing  {CHP DME ACZT:081728154}  Dressing  {CHP DME ZSQE:246701217}  Toileting  {CHP DME XCPL:428835873}  Feeding  {P DME HIY}  Med Admin  {P DME OZOD:159790807}  Med Delivery   { KEVIN MED Delivery:468693788}    Wound Care Documentation and Therapy:        Elimination:  Continence: Bowel: {YES / QT:41157}  Bladder: {YES / T}  Urinary Catheter: {Urinary Catheter:070750447}   Colostomy/Ileostomy/Ileal Conduit: {YES / AM:76960}       Date of Last BM: ***  No intake or output data in the 24 hours ending 22 1446  I/O last 3 completed shifts:   In: 300 [I.V.:300]  Out: 1550 [Urine:1300; Blood:250]    Safety Concerns:     [address] Safety Concerns:223686254}    Impairments/Disabilities:      [address] Impairments/Disabilities:173753347}    Nutrition Therapy:  Current Nutrition Therapy:   [address] Diet List:552707285}    Routes of Feeding: {[facility] DME Other Feedings:155641253}  Liquids: {Slp liquid thickness:35507}  Daily Fluid Restriction: {CHP DME Yes amt example:692471347}  Last Modified Barium Swallow with Video (Video Swallowing Test): {Done Not Done YBRM:816399775}    Treatments at the Time of Hospital Discharge:   Respiratory Treatments: ***  Oxygen Therapy:  {Therapy; copd oxygen:32568}  Ventilator:    { CC Vent LLHT:141234449}    Rehab Therapies: Physical Therapy and Occupational Therapy  Weight Bearing Status/Restrictions: [employer]  Weight Bearin}  Other Medical Equipment (for information only, NOT a DME order):  {EQUIPMENT:778498627}  Other Treatments: skilled RN    Patient's personal belongings (please select all that are sent with patient):  {RENE DME Belongings:679122291}    RN SIGNATURE:  {Esignature:493769712}    CASE MANAGEMENT/SOCIAL WORK SECTION    Inpatient Status Date: ***    Readmission Risk Assessment Score:  Readmission Risk              Risk of Unplanned Readmission:  16           Discharging to Facility/ Agency   Name: Fay L[facility]  Address:  [address] [place] [address], [address] [place] [address]         Phone: [phone]       Fax: [phone]        Phone:  Fax:    Dialysis Facility (if applicable)   Name:  Address:  Dialysis Schedule:  Phone:  Fax:    / signature: Electronically signed by Mele Swartz RN on 3/18/22 at 2:58 PM EDT    PHYSICIAN SECTION    Prognosis: Good    Condition at Discharge: Stable    Rehab Potential (if transferring to Rehab): Good    Recommended Labs or Other Treatments After Discharge: None    Physician Certification: I certify the above information and transfer of Chalo Cabrera  is necessary for the continuing treatment of the diagnosis listed and that she requires Home Care for less 30 days.      Update Admission H&P: No change in H&P    PHYSICIAN SIGNATURE:  Electronically signed by Sariah Polo DO on 3/18/22 at 2:47 PM EDT

## 2022-03-18 NOTE — CARE COORDINATION
Case Management Initial Discharge Plan  Ashkan Lima,             Met with:patient to discuss discharge plans. Information verified: address, contacts, phone number, , insurance Yes  Insurance Provider: [address] Medicare    Emergency Contact/Next of Kin name & number: Andrew Joyce (daughter) [phone]  Who are involved in patient's support system? daughters    PCP: SHONA Marmolejo CNP  Date of last visit: 2 weeks ago      Discharge Planning    Living Arrangements:  [address] has 1 stories  0 stairs to climb to get into front door,   Location of bedroom/bathroom in home main    Patient able to perform ADL's:Assisted    Current Services (outpatient & in home) [facility]  DME equipment: hospital bed, wheelchair, walker, shower chair, commode  DME provider:     Is patient receiving oral anticoagulation therapy? No    If indicated:   Physician managing anticoagulation treatment:   Where does patient obtain lab work for ATC treatment? Potential Assistance Needed:  Home Care,Outpatient PT/OT    Patient agreeable to home care: Yes  Freedom of choice provided:  current with [facility]    Prior SNF/Rehab Placement and Facility: yes  Agreeable to SNF/Rehab: No  [place] of choice provided: no     Evaluation: no    Expected Discharge date:  22    Patient expects to be discharged to: If home: is the family and/or caregiver wiling & able to provide support at home? daughter  Who will be providing this support? daughter    Follow Up Appointment: Best Day/ Time: Monday AM    Transportation provider: needs  Transportation arrangements needed for discharge: No    Readmission Risk              Risk of Unplanned Readmission:  16             Does patient have a readmission risk score greater than 14?: Yes  If yes, follow-up appointment must be made within 7 days of discharge.      Goals of Care:       Educated patient on transitional options, provided freedom of choice and are agreeable with plan      Discharge Plan: home with daughters' support, [facility]. Notified Adriel Naqvi at [facility] that patient will DC today.   Transportation through [address] @ 6:30pm per Len Stout, patient updated, agreeable, patient states she will notify her daughter          Electronically signed by Edith Sandra RN on 3/18/22 at 3:00 PM EDT

## 2022-03-19 NOTE — DISCHARGE SUMMARY
tablets by mouth every 4-6 hours as needed for Pain for up to 7 days. Intended supply: 3 days. Take lowest dose possible to manage pain  Notes to patient: Your last dose was 3/18/2022 @ 5:47 pm        CHANGE how you take these medications    gabapentin 100 MG capsule  Commonly known as: NEURONTIN  Take 1 capsule by mouth 3 times daily for 42 doses.   What changed:   · medication strength  · how much to take  · when to take this     vitamin D 1.25 MG (41270 UT) Caps capsule  Commonly known as: ERGOCALCIFEROL  Take 1 capsule by mouth once a week for 8 doses  What changed: additional instructions        CONTINUE taking these medications    alendronate 70 MG tablet  Commonly known as: FOSAMAX     aspirin 81 MG EC tablet     brimonidine 0.2 % ophthalmic solution  Commonly known as: ALPHAGAN     busPIRone 7.5 MG tablet  Commonly known as: BUSPAR     citalopram 40 MG tablet  Commonly known as: CELEXA     dicyclomine 10 MG capsule  Commonly known as: BENTYL  Notes to patient: Your last dose was 3/18/2022 @ 12:44pm     empagliflozin 25 MG tablet  Commonly known as: JARDIANCE     Entresto 49-51 MG per tablet  Generic drug: sacubitril-valsartan     esomeprazole 40 MG delayed release capsule  Commonly known as: NEXIUM     furosemide 40 MG tablet  Commonly known as: LASIX     LIPITOR PO     methocarbamol 750 MG tablet  Commonly known as: Robaxin-750  Take 1 tablet by mouth 4 times daily for 10 days  Notes to patient: Your last dose was 3/18/2022 @ 5:47 pm     Synthroid 125 MCG tablet  Generic drug: levothyroxine        STOP taking these medications    HYDROcodone-acetaminophen 5-325 MG per tablet  Commonly known as: Norco     sulfamethoxazole-trimethoprim 800-160 MG per tablet  Commonly known as: BACTRIM DS;SEPTRA DS           Where to Get Your Medications      These medications were sent to [facility] [address] [facility], [address] [place] [address]  [address], [address] [place] [address]    Phone: [phone]   · acetaminophen 500 MG tablet  · docusate sodium 100 MG capsule  · naproxen 500 MG tablet  · oxyCODONE 5 MG immediate release tablet     You can get these medications from any pharmacy    Bring a paper prescription for each of these medications  · gabapentin 100 MG capsule       Activity: See DC instructions  Wound Care: See DC instructions    Follow-up with Rafi Arroyo DO in 10-14 days.     Signed:  Carmen Negrete DO   9:31 PM 3/18/2022

## 2022-03-23 DIAGNOSIS — S82.872D CLOSED DISPLACED PILON FRACTURE OF LEFT TIBIA WITH ROUTINE HEALING, SUBSEQUENT ENCOUNTER: Primary | ICD-10-CM

## 2022-03-23 RX ORDER — OXYCODONE HYDROCHLORIDE 5 MG/1
5 TABLET ORAL EVERY 6 HOURS PRN
Qty: 28 TABLET | Refills: 0 | Status: SHIPPED | OUTPATIENT
Start: 2022-03-23 | End: 2022-03-30

## 2022-03-23 NOTE — TELEPHONE ENCOUNTER
Patient would like a refill on medication.       DOS:3/16/2022 Open reduction and internal fixation of left pilon  fracture and intramedullary nail fixation of left fibula

## 2022-03-29 ENCOUNTER — OFFICE VISIT (OUTPATIENT)
Dept: ORTHOPEDIC SURGERY | Age: 77
End: 2022-03-29

## 2022-03-29 VITALS — WEIGHT: 230 LBS | BODY MASS INDEX: 40.75 KG/M2 | HEIGHT: 63 IN

## 2022-03-29 DIAGNOSIS — S82.872D CLOSED DISPLACED PILON FRACTURE OF LEFT TIBIA WITH ROUTINE HEALING, SUBSEQUENT ENCOUNTER: Primary | ICD-10-CM

## 2022-03-29 PROCEDURE — 99024 POSTOP FOLLOW-UP VISIT: CPT | Performed by: NURSE PRACTITIONER

## 2022-03-29 RX ORDER — METHOCARBAMOL 750 MG/1
750 TABLET, FILM COATED ORAL 3 TIMES DAILY PRN
Qty: 30 TABLET | Refills: 0 | Status: SHIPPED | OUTPATIENT
Start: 2022-03-29 | End: 2022-04-07 | Stop reason: SDUPTHER

## 2022-03-29 RX ORDER — OXYCODONE HYDROCHLORIDE 5 MG/1
5 TABLET ORAL EVERY 6 HOURS PRN
Qty: 20 TABLET | Refills: 0 | Status: SHIPPED | OUTPATIENT
Start: 2022-03-29 | End: 2022-04-03

## 2022-03-29 RX ORDER — GABAPENTIN 100 MG/1
100 CAPSULE ORAL 3 TIMES DAILY
Qty: 30 CAPSULE | Refills: 0 | Status: SHIPPED | OUTPATIENT
Start: 2022-03-29 | End: 2022-04-07 | Stop reason: SDUPTHER

## 2022-03-29 RX ORDER — DOCUSATE SODIUM 100 MG/1
100 CAPSULE, LIQUID FILLED ORAL 2 TIMES DAILY
Qty: 30 CAPSULE | Refills: 0 | Status: SHIPPED | OUTPATIENT
Start: 2022-03-29

## 2022-03-29 NOTE — LETTER
[facility]  [address] [facility] [address] [facility]  Phone: [phone]  Fax: [phone]    SHONA Ma CNP        March 29, 2022     Patient: Alex Oliveira   YOB: 1945   Date of Visit: 3/29/2022       To Whom it May Concern:    Xu Christensen was seen in my clinic on 3/29/2022. Sutures were removed and steri strips were applied. Please leave steri strips in place until they naturally fall off. Wash incisions and pin sites with soap and water daily. Okay to leave pin sites and incisions open to air. If the incision is draining, okay to cover with dry dressing but change it daily. Continue non-weight bearing to the left lower extremity. Okay for range of motion of the toes and knee as tolerated. Continue to elevate and ice the extremity to improve pain and swelling. If you have any questions or concerns, please don't hesitate to call.     Sincerely,         SHONA Ma CNP

## 2022-03-29 NOTE — PROGRESS NOTES
[facility]  [address] [facility] [address] [place]  [address]  Dept Phone: [phone]  Dept Fax: [phone]      Orthopaedic Trauma Clinic Follow Up      Subjective:   Date of Surgeries:     3/16/2022: ORIF left pilon fracture and IMN of left fibula    3/2/2022: ex fix pin exchange     2/10/2022: closed reduction left pilon fracture and application of [place] ex fix       Aylin Piper is a 77y.o. year old female who presents to the clinic today for routine follow up 2 weeks post-operatively from open reduction and internal fixation of the left pilon fracture and intramedullary nail fixation of the left fibula. Patient states she has maintained non-weight bearing to the left lower extremity. Denies any new injuries or falls. States the swelling and pain continue to improve but she still has numbness to the bottom of her foot that remains unchanged. States she has home care to help with wound care/pin care but they were instructed not to clean the pin sites. Review of Systems  Gen: no fever, chills, malaise  CV: no chest pain or palpitations  Resp: no cough or shortness of breath  GI: no nausea, vomiting, diarrhea, or constipation  Neuro: no seizures, vertigo, or headache  Msk: left leg pain, left foot numbness   10 remaining systems reviewed and negative    Objective : There were no vitals filed for this visit. Body mass index is 40.74 kg/m². General: No acute distress, resting comfortably in the clinic  Neuro: alert. oriented  Eyes: Extra-ocular muscles intact  Pulm: Respirations unlabored and regular. Skin: warm, well perfused  Psych:   Patient has good fund of knowledge and displays understanding of exam, diagnosis, and plan. LLE:  Ex-fix in place appropriately off of the skin, no evidence of drainage or erythema from pin sites. Most superior shanz pin with serosanguinous drainage. Sutures in place-removed. No evidence of dehiscence or infection of the incisions.  Small amount of serous drainage from midline anterior incision. Swelling significantly improved. Dysesthesia to the plantar aspect of the foot from heel to toes. Sensation intact to light touch on dorsal aspect of toes and foot. Able to flex and extend toes. Digits warm and well perfused with BCR. Compartments soft. 2+ DP pulse. Radiology:  No radiographs obtained today. Assessment:   77y.o. year old female with a left pilon and distal fibula fracture s/p closed reduction and circular external fixator application on 5/23/8435, revision of ex-fix on 3/2/2022, ORIF left pilon and IMN left fibula 3/16/2022. Plan:   - Overall, patient is doing well in regards to pain and swelling. Pin sites look clean and free of infection. Sutures removed and steri strips applied. Instructed to wash pin sites and incisions daily with soap and water. May leave open to air but if there is drainage, may cover with a dry dressing and change it daily. - Continue ice and elevation as tolerated. Educated on risk of pressure ulcers if foot is resting on something for too long as patient does not have feeling to the bottom of her foot. Often have someone inspect the bottom of the foot for any signs of skin injury. - Continue non-weight bearing to the left lower extremity. - Refills sent to pt pharmacy  - F/u in 4 weeks with x-rays, plan to discuss surgery for ex-fix removal and timeline for tentative arthrodesis     Follow up:Return in about 4 weeks (around 4/26/2022) for x-rays. Orders Placed This Encounter   Medications    docusate sodium (COLACE) 100 MG capsule     Sig: Take 1 capsule by mouth 2 times daily     Dispense:  30 capsule     Refill:  0    gabapentin (NEURONTIN) 100 MG capsule     Sig: Take 1 capsule by mouth 3 times daily for 10 days.      Dispense:  30 capsule     Refill:  0    oxyCODONE (ROXICODONE) 5 MG immediate release tablet     Sig: Take 1 tablet by mouth every 6 hours as needed for Pain (use as a last resort when all other prescribed medications have failed to adequately control pain) for up to 5 days. Dispense:  20 tablet     Refill:  0     Reduce doses taken as pain becomes manageable    methocarbamol (ROBAXIN-750) 750 MG tablet     Sig: Take 1 tablet by mouth 3 times daily as needed (muscle spasms)     Dispense:  30 tablet     Refill:  0          No orders of the defined types were placed in this encounter. Electronically signed by SHONA Rapp CNP on 3/29/2022 at 11:45 AM    This note is created with the assistance of a speech recognition program.  While intending to generate a document that actually reflects the content of the visit, the document can still have some errors including those of syntax and sound a like substitutions which may escape proof reading.   In such instances, actual meaning can be extrapolated by contextual diversion

## 2022-03-30 ENCOUNTER — TELEPHONE (OUTPATIENT)
Dept: ORTHOPEDIC SURGERY | Age: 77
End: 2022-03-30

## 2022-03-30 NOTE — TELEPHONE ENCOUNTER
pts daughter called in stating that pts insurance is requesting a [place] Auth on methocarbamol (ROBAXIN-750) 750 MG tablet  Pts daughter was told it give this number for a PA [phone].

## 2022-03-31 ENCOUNTER — TELEPHONE (OUTPATIENT)
Dept: ORTHOPEDIC SURGERY | Age: 77
End: 2022-03-31

## 2022-03-31 NOTE — TELEPHONE ENCOUNTER
Patients daughter Nikita Mehta called. She says that Margy's foot is showing some increase in redness. She does not think its infected and its no where near what it looked like when she was infected before. She was more calling to let us know. She didn't know if an antibiotic could be called in? She could not make it to an appt tomorrow d/t transportation but did want to be seen on Tuesday. She says the foot pins look fine its the pins above her ankle.

## 2022-04-01 NOTE — TELEPHONE ENCOUNTER
Patient's daughter called in to inform Dr. Isa Hernandez the home nurse did come look at the patient today and everything looks fine. Please advise thank you!

## 2022-04-07 DIAGNOSIS — S82.872D CLOSED DISPLACED PILON FRACTURE OF LEFT TIBIA WITH ROUTINE HEALING, SUBSEQUENT ENCOUNTER: ICD-10-CM

## 2022-04-07 RX ORDER — METHOCARBAMOL 750 MG/1
750 TABLET, FILM COATED ORAL 3 TIMES DAILY PRN
Qty: 30 TABLET | Refills: 0 | Status: SHIPPED | OUTPATIENT
Start: 2022-04-07 | End: 2022-05-02 | Stop reason: SDUPTHER

## 2022-04-07 RX ORDER — GABAPENTIN 100 MG/1
100 CAPSULE ORAL 3 TIMES DAILY
Qty: 30 CAPSULE | Refills: 0 | Status: SHIPPED | OUTPATIENT
Start: 2022-04-07 | End: 2022-04-26 | Stop reason: ALTCHOICE

## 2022-04-07 NOTE — TELEPHONE ENCOUNTER
Patient called in for pending medication refills . ... She would like to know if she needs to continue the vitamin D once per week, if so she will also need this sent in. She is no longer on oxycodone anymore  but would like something for her  pain? Please advise    03/16/2022  OPERATION PERFORMED:  Open reduction and internal fixation of left pilon  fracture and intramedullary nail fixation of left fibula, CTP 67412.

## 2022-04-11 ENCOUNTER — TELEPHONE (OUTPATIENT)
Dept: ORTHOPEDIC SURGERY | Age: 77
End: 2022-04-11

## 2022-04-11 DIAGNOSIS — S82.872D CLOSED DISPLACED PILON FRACTURE OF LEFT TIBIA WITH ROUTINE HEALING, SUBSEQUENT ENCOUNTER: Primary | ICD-10-CM

## 2022-04-11 RX ORDER — HYDROCODONE BITARTRATE AND ACETAMINOPHEN 5; 325 MG/1; MG/1
1 TABLET ORAL EVERY 8 HOURS PRN
Qty: 21 TABLET | Refills: 0 | Status: SHIPPED | OUTPATIENT
Start: 2022-04-11 | End: 2022-04-18

## 2022-04-11 NOTE — TELEPHONE ENCOUNTER
If patient would like I can give her a prescription for Norco, if she doesn't want that strong of a pain medication I can give her tramadol.

## 2022-04-11 NOTE — TELEPHONE ENCOUNTER
DATE OF PROCEDURE:  03/16/2022     PREOPERATIVE DIAGNOSIS:  Left pilon fracture.     POSTOPERATIVE DIAGNOSIS:  Left pilon fracture.     OPERATION PERFORMED:  Open reduction and internal fixation of left pilon  fracture and intramedullary nail fixation of left fibula    Patient called in asking for something for pain. She is no longer taking oxycodone 5mg, put says tylenol is not strong enough. please advise.

## 2022-04-19 ENCOUNTER — TELEPHONE (OUTPATIENT)
Dept: ORTHOPEDIC SURGERY | Age: 77
End: 2022-04-19

## 2022-04-19 DIAGNOSIS — S82.872D CLOSED DISPLACED PILON FRACTURE OF LEFT TIBIA WITH ROUTINE HEALING, SUBSEQUENT ENCOUNTER: Primary | ICD-10-CM

## 2022-04-19 RX ORDER — HYDROCODONE BITARTRATE AND ACETAMINOPHEN 5; 325 MG/1; MG/1
1 TABLET ORAL EVERY 8 HOURS PRN
Qty: 21 TABLET | Refills: 0 | Status: SHIPPED | OUTPATIENT
Start: 2022-04-19 | End: 2022-04-26

## 2022-04-19 RX ORDER — GABAPENTIN 100 MG/1
100 CAPSULE ORAL 3 TIMES DAILY
Qty: 42 CAPSULE | Refills: 0 | Status: SHIPPED | OUTPATIENT
Start: 2022-04-19 | End: 2022-05-02 | Stop reason: SDUPTHER

## 2022-04-19 RX ORDER — METHOCARBAMOL 750 MG/1
750 TABLET, FILM COATED ORAL 3 TIMES DAILY
Qty: 30 TABLET | Refills: 0 | Status: SHIPPED | OUTPATIENT
Start: 2022-04-19 | End: 2022-04-26 | Stop reason: ALTCHOICE

## 2022-04-19 NOTE — TELEPHONE ENCOUNTER
Patient contacted the office and asked for refills on gabapentin, methocarbamol and norco.  Please advise- [address] in chart.   Patient s/p OPEN REDUCTION INTERNAL FIXATION  PILON FRACTURE INTRAMEDULLARY NAIL LEFT FIBULA  C ARM, JHONATAN,ACUMED on 3/16/22

## 2022-04-26 ENCOUNTER — OFFICE VISIT (OUTPATIENT)
Dept: ORTHOPEDIC SURGERY | Age: 77
End: 2022-04-26

## 2022-04-26 ENCOUNTER — ANESTHESIA EVENT (OUTPATIENT)
Dept: OPERATING ROOM | Age: 77
End: 2022-04-26
Payer: MEDICARE

## 2022-04-26 VITALS — HEIGHT: 63 IN | WEIGHT: 230 LBS | BODY MASS INDEX: 40.75 KG/M2

## 2022-04-26 DIAGNOSIS — S82.872G CLOSED DISPLACED PILON FRACTURE OF LEFT TIBIA WITH DELAYED HEALING, SUBSEQUENT ENCOUNTER: Primary | ICD-10-CM

## 2022-04-26 DIAGNOSIS — S82.872D CLOSED DISPLACED PILON FRACTURE OF LEFT TIBIA WITH ROUTINE HEALING, SUBSEQUENT ENCOUNTER: Primary | ICD-10-CM

## 2022-04-26 PROCEDURE — 99024 POSTOP FOLLOW-UP VISIT: CPT | Performed by: ORTHOPAEDIC SURGERY

## 2022-04-26 NOTE — PROGRESS NOTES
[address] [facility]  [facility] [facility] [address]  Dept: [phone]  Dept Fax: [phone]        Orthopaedic Trauma Clinic Follow Up    Subjective:   Date of Surgery:     3/16/2022: ORIF left pilon fracture and IMN of left fibula     3/2/2022: ex fix pin exchange      2/10/2022: closed reduction left pilon fracture and application of [place] ex fix    Shaista Mcclure is a 77y.o. year old female who presents to the clinic today for routine followup regarding her left pilon fracture that underwent application of circular external fixator and ultimately ORIF of it on 3/16/2022 therefore placing patient probably 6 weeks post surgery but also almost 11 weeks post injury. Patient is here today for routine follow-up. Reports be doing well. Denies any pain. Denies any new injuries/falls. Otherwise, patient has not orthopedic complaints at this time. Review of Systems  Gen: no fever, chills, malaise  CV: no chest pain or palpitations  Resp: no cough or shortness of breath  GI: no nausea, vomiting, diarrhea, or constipation  Neuro: no numbness, tingling, or weakness  Msk: As described in HPI  10 remaining systems reviewed and negative    Objective : There were no vitals filed for this visit. Body mass index is 40.74 kg/m². General: No acute distress, resting comfortably in the clinic  Neuro: alert. oriented  Eyes: Extra-ocular muscles intact  Pulm: Respirations unlabored and regular. Skin: warm, well perfused  Psych:   Patient has good fund of knowledge and displays understanging of exam, diagnosis, and plan. MSK: Left lower extremity:  Circular Ex-Fix is on with pins intact. No signs of erythema or cellulitic changes are noted along the pin sites. Incision is well-healed and scarred. Patient has dysesthesias to the plantar aspect of her foot from the heel to the toes which is at baseline.   Otherwise sensations intact light touch about the dorsal aspect of the toes and foot. Patient is able to flex and extend toes. Digits are warm and well perfused with +2 DP pulse. Radiology:  History: Left pilon fracture status post circular external fixator placement and ORIF    Findings: AP, lateral, oblique views of the ankle and left tibia/fibula showing orthopedic hardware that is redemonstrated. No new displacement is seen in fracture sites. Tibiotalar joint space is well-maintained. Impression: Healing left pilon fracture     Assessment:   77y.o. year old female with left pilon fracture status post circular external fixator placement and ORIF  Plan:      Patient is here today for follow-up of the after mentioned surgery. At this time, patient is approaching 11 weeks post injury. The fracture appears stable and the joint space well-maintained. Ultimately, we feel that she is ready to remove the circular external fixator frame. Patient would like this performed tomorrow which we will be able to accommodate her for. Consent was obtained in office. Risks and benefits of surgery were discussed in length and patient elected to proceed with treatment. We will follow her back up 2 weeks after surgery. She was amenable to all recommendations and was encouraged to call the office with any questions. Follow up:Return 2 weeks post sx. No orders of the defined types were placed in this encounter. No orders of the defined types were placed in this encounter.       Electronically signed by Ferdinand Davenport DO on 4/26/2022 at 4:36 PM

## 2022-04-27 ENCOUNTER — APPOINTMENT (OUTPATIENT)
Dept: GENERAL RADIOLOGY | Age: 77
End: 2022-04-27
Attending: ORTHOPAEDIC SURGERY
Payer: MEDICARE

## 2022-04-27 ENCOUNTER — HOSPITAL ENCOUNTER (OUTPATIENT)
Age: 77
Setting detail: OUTPATIENT SURGERY
Discharge: HOME | End: 2022-04-27
Attending: ORTHOPAEDIC SURGERY | Admitting: ORTHOPAEDIC SURGERY
Payer: MEDICARE

## 2022-04-27 ENCOUNTER — ANESTHESIA (OUTPATIENT)
Dept: OPERATING ROOM | Age: 77
End: 2022-04-27
Payer: MEDICARE

## 2022-04-27 VITALS
BODY MASS INDEX: 40.75 KG/M2 | SYSTOLIC BLOOD PRESSURE: 125 MMHG | RESPIRATION RATE: 20 BRPM | HEIGHT: 63 IN | OXYGEN SATURATION: 94 % | TEMPERATURE: 96.4 F | DIASTOLIC BLOOD PRESSURE: 80 MMHG | WEIGHT: 230 LBS | HEART RATE: 87 BPM

## 2022-04-27 VITALS — SYSTOLIC BLOOD PRESSURE: 111 MMHG | DIASTOLIC BLOOD PRESSURE: 82 MMHG | OXYGEN SATURATION: 99 %

## 2022-04-27 DIAGNOSIS — S82.872D CLOSED DISPLACED PILON FRACTURE OF LEFT TIBIA WITH ROUTINE HEALING, SUBSEQUENT ENCOUNTER: Primary | ICD-10-CM

## 2022-04-27 LAB
GFR NON-AFRICAN AMERICAN: >60 ML/MIN
GFR SERPL CREATININE-BSD FRML MDRD: >60 ML/MIN
GFR SERPL CREATININE-BSD FRML MDRD: NORMAL ML/MIN/{1.73_M2}
GLUCOSE BLD-MCNC: 184 MG/DL (ref 74–100)
POC BUN: 13 MG/DL (ref 8–26)
POC CREATININE: 0.86 MG/DL (ref 0.51–1.19)
POC POTASSIUM: 4.5 MMOL/L (ref 3.5–4.5)
POC SODIUM: 144 MMOL/L (ref 138–146)

## 2022-04-27 PROCEDURE — 2580000003 HC RX 258: Performed by: ANESTHESIOLOGY

## 2022-04-27 PROCEDURE — 3700000000 HC ANESTHESIA ATTENDED CARE: Performed by: ORTHOPAEDIC SURGERY

## 2022-04-27 PROCEDURE — 73590 X-RAY EXAM OF LOWER LEG: CPT

## 2022-04-27 PROCEDURE — 2580000003 HC RX 258

## 2022-04-27 PROCEDURE — 20694 RMVL EXT FIXJ SYS UNDER ANES: CPT | Performed by: ORTHOPAEDIC SURGERY

## 2022-04-27 PROCEDURE — 84132 ASSAY OF SERUM POTASSIUM: CPT

## 2022-04-27 PROCEDURE — 82565 ASSAY OF CREATININE: CPT

## 2022-04-27 PROCEDURE — 7100000040 HC SPAR PHASE II RECOVERY - FIRST 15 MIN: Performed by: ORTHOPAEDIC SURGERY

## 2022-04-27 PROCEDURE — 82947 ASSAY GLUCOSE BLOOD QUANT: CPT

## 2022-04-27 PROCEDURE — 2500000003 HC RX 250 WO HCPCS: Performed by: ANESTHESIOLOGY

## 2022-04-27 PROCEDURE — 3700000001 HC ADD 15 MINUTES (ANESTHESIA): Performed by: ORTHOPAEDIC SURGERY

## 2022-04-27 PROCEDURE — 2580000003 HC RX 258: Performed by: ORTHOPAEDIC SURGERY

## 2022-04-27 PROCEDURE — 2709999900 HC NON-CHARGEABLE SUPPLY: Performed by: ORTHOPAEDIC SURGERY

## 2022-04-27 PROCEDURE — 64445 NJX AA&/STRD SCIATIC NRV IMG: CPT | Performed by: ANESTHESIOLOGY

## 2022-04-27 PROCEDURE — 84295 ASSAY OF SERUM SODIUM: CPT

## 2022-04-27 PROCEDURE — 73610 X-RAY EXAM OF ANKLE: CPT

## 2022-04-27 PROCEDURE — 6360000002 HC RX W HCPCS

## 2022-04-27 PROCEDURE — 3209999900 FLUORO FOR SURGICAL PROCEDURES

## 2022-04-27 PROCEDURE — 6360000002 HC RX W HCPCS: Performed by: ANESTHESIOLOGY

## 2022-04-27 PROCEDURE — 3600000004 HC SURGERY LEVEL 4 BASE: Performed by: ORTHOPAEDIC SURGERY

## 2022-04-27 PROCEDURE — 7100000041 HC SPAR PHASE II RECOVERY - ADDTL 15 MIN: Performed by: ORTHOPAEDIC SURGERY

## 2022-04-27 PROCEDURE — 84520 ASSAY OF UREA NITROGEN: CPT

## 2022-04-27 PROCEDURE — 3600000014 HC SURGERY LEVEL 4 ADDTL 15MIN: Performed by: ORTHOPAEDIC SURGERY

## 2022-04-27 RX ORDER — DIPHENHYDRAMINE HYDROCHLORIDE 50 MG/ML
12.5 INJECTION INTRAMUSCULAR; INTRAVENOUS
Status: DISCONTINUED | OUTPATIENT
Start: 2022-04-27 | End: 2022-04-27 | Stop reason: HOSPADM

## 2022-04-27 RX ORDER — MAGNESIUM HYDROXIDE 1200 MG/15ML
LIQUID ORAL CONTINUOUS PRN
Status: COMPLETED | OUTPATIENT
Start: 2022-04-27 | End: 2022-04-27

## 2022-04-27 RX ORDER — SODIUM CHLORIDE 0.9 % (FLUSH) 0.9 %
5-40 SYRINGE (ML) INJECTION EVERY 12 HOURS SCHEDULED
Status: DISCONTINUED | OUTPATIENT
Start: 2022-04-27 | End: 2022-04-27 | Stop reason: HOSPADM

## 2022-04-27 RX ORDER — SODIUM CHLORIDE 9 MG/ML
INJECTION, SOLUTION INTRAVENOUS PRN
Status: DISCONTINUED | OUTPATIENT
Start: 2022-04-27 | End: 2022-04-27 | Stop reason: HOSPADM

## 2022-04-27 RX ORDER — SODIUM CHLORIDE 0.9 % (FLUSH) 0.9 %
5-40 SYRINGE (ML) INJECTION PRN
Status: DISCONTINUED | OUTPATIENT
Start: 2022-04-27 | End: 2022-04-27 | Stop reason: HOSPADM

## 2022-04-27 RX ORDER — FENTANYL CITRATE 50 UG/ML
INJECTION, SOLUTION INTRAMUSCULAR; INTRAVENOUS
Status: COMPLETED
Start: 2022-04-27 | End: 2022-04-27

## 2022-04-27 RX ORDER — SODIUM CHLORIDE, SODIUM LACTATE, POTASSIUM CHLORIDE, CALCIUM CHLORIDE 600; 310; 30; 20 MG/100ML; MG/100ML; MG/100ML; MG/100ML
INJECTION, SOLUTION INTRAVENOUS CONTINUOUS
Status: DISCONTINUED | OUTPATIENT
Start: 2022-04-27 | End: 2022-04-27 | Stop reason: HOSPADM

## 2022-04-27 RX ORDER — MIDAZOLAM HYDROCHLORIDE 1 MG/ML
INJECTION INTRAMUSCULAR; INTRAVENOUS
Status: COMPLETED
Start: 2022-04-27 | End: 2022-04-27

## 2022-04-27 RX ORDER — PROPOFOL 10 MG/ML
INJECTION, EMULSION INTRAVENOUS CONTINUOUS PRN
Status: DISCONTINUED | OUTPATIENT
Start: 2022-04-27 | End: 2022-04-27 | Stop reason: SDUPTHER

## 2022-04-27 RX ORDER — MIDAZOLAM HYDROCHLORIDE 2 MG/2ML
0.5 INJECTION, SOLUTION INTRAMUSCULAR; INTRAVENOUS 4 TIMES DAILY PRN
Status: DISCONTINUED | OUTPATIENT
Start: 2022-04-27 | End: 2022-04-27 | Stop reason: HOSPADM

## 2022-04-27 RX ORDER — BUPIVACAINE HYDROCHLORIDE 5 MG/ML
40 INJECTION, SOLUTION EPIDURAL; INTRACAUDAL ONCE
Status: COMPLETED | OUTPATIENT
Start: 2022-04-27 | End: 2022-04-27

## 2022-04-27 RX ORDER — ONDANSETRON 2 MG/ML
4 INJECTION INTRAMUSCULAR; INTRAVENOUS
Status: DISCONTINUED | OUTPATIENT
Start: 2022-04-27 | End: 2022-04-27 | Stop reason: HOSPADM

## 2022-04-27 RX ORDER — FENTANYL CITRATE 50 UG/ML
25 INJECTION, SOLUTION INTRAMUSCULAR; INTRAVENOUS EVERY 5 MIN PRN
Status: DISCONTINUED | OUTPATIENT
Start: 2022-04-27 | End: 2022-04-27 | Stop reason: HOSPADM

## 2022-04-27 RX ORDER — MORPHINE SULFATE 2 MG/ML
2 INJECTION, SOLUTION INTRAMUSCULAR; INTRAVENOUS EVERY 5 MIN PRN
Status: DISCONTINUED | OUTPATIENT
Start: 2022-04-27 | End: 2022-04-27 | Stop reason: HOSPADM

## 2022-04-27 RX ORDER — FENTANYL CITRATE 50 UG/ML
50 INJECTION, SOLUTION INTRAMUSCULAR; INTRAVENOUS PRN
Status: DISCONTINUED | OUTPATIENT
Start: 2022-04-27 | End: 2022-04-27 | Stop reason: HOSPADM

## 2022-04-27 RX ORDER — HYDROCODONE BITARTRATE AND ACETAMINOPHEN 5; 325 MG/1; MG/1
1 TABLET ORAL EVERY 6 HOURS PRN
Qty: 28 TABLET | Refills: 0 | Status: SHIPPED | OUTPATIENT
Start: 2022-04-27 | End: 2022-05-11 | Stop reason: SDUPTHER

## 2022-04-27 RX ADMIN — WATER 2000 MG: 1 INJECTION INTRAMUSCULAR; INTRAVENOUS; SUBCUTANEOUS at 08:40

## 2022-04-27 RX ADMIN — Medication 200 MG: at 08:14

## 2022-04-27 RX ADMIN — SODIUM CHLORIDE, POTASSIUM CHLORIDE, SODIUM LACTATE AND CALCIUM CHLORIDE: 600; 310; 30; 20 INJECTION, SOLUTION INTRAVENOUS at 07:38

## 2022-04-27 RX ADMIN — FENTANYL CITRATE 50 MCG: 50 INJECTION, SOLUTION INTRAMUSCULAR; INTRAVENOUS at 08:13

## 2022-04-27 RX ADMIN — PROPOFOL 175 MCG/KG/MIN: 10 INJECTION, EMULSION INTRAVENOUS at 08:33

## 2022-04-27 RX ADMIN — MIDAZOLAM HYDROCHLORIDE 1 MG: 2 INJECTION, SOLUTION INTRAMUSCULAR; INTRAVENOUS at 08:05

## 2022-04-27 RX ADMIN — MIDAZOLAM HYDROCHLORIDE 1 MG: 1 INJECTION, SOLUTION INTRAMUSCULAR; INTRAVENOUS at 08:05

## 2022-04-27 ASSESSMENT — PULMONARY FUNCTION TESTS
PIF_VALUE: 1
PIF_VALUE: 0
PIF_VALUE: 1
PIF_VALUE: 1
PIF_VALUE: 0
PIF_VALUE: 0
PIF_VALUE: 1
PIF_VALUE: 0
PIF_VALUE: 1
PIF_VALUE: 0
PIF_VALUE: 1
PIF_VALUE: 0
PIF_VALUE: 0
PIF_VALUE: 1
PIF_VALUE: 1
PIF_VALUE: 0
PIF_VALUE: 1
PIF_VALUE: 0
PIF_VALUE: 1

## 2022-04-27 ASSESSMENT — PAIN - FUNCTIONAL ASSESSMENT: PAIN_FUNCTIONAL_ASSESSMENT: 0-10

## 2022-04-27 ASSESSMENT — ENCOUNTER SYMPTOMS
SHORTNESS OF BREATH: 1
DYSPNEA ACTIVITY LEVEL: NO INTERVAL CHANGE

## 2022-04-27 ASSESSMENT — LIFESTYLE VARIABLES: SMOKING_STATUS: 0

## 2022-04-27 NOTE — BRIEF OP NOTE
Brief Postoperative Note      Patient: Gwen Montesinos  YOB: 1945  MRN: [id number]    Date of Procedure: 4/27/2022    Pre-Op Diagnosis: Left pilon fracture s/p ring external fixator    Post-Op Diagnosis: Left pilon fracture s/p ring external fixator       Procedure(s):  Left pilon ring external fixator removal    Surgeon(s):  Marcello Galeazzi, DO    Assistant:  Resident: Mekhi Payton DO; Cary Coles DO    Anesthesia: Monitor Anesthesia Care    Estimated Blood Loss (mL): <5 mL    Complications: None    Specimens:   * No specimens in log *    Implants:  Implant Name Type Inv.  Item Serial No.  Lot No. LRB No. Used Action   External Fixator Pins      Left 8 Explanted         Drains: * No LDAs found *    Findings: See op note for details    Electronically signed by Cary Coles DO on 4/27/2022 at 6:35 PM
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
## 2022-04-28 NOTE — OP NOTE
Operative Note      Patient: Roxann Zeng  YOB: 1945  MRN: [id number]    Date of Procedure: 4/27/2022    Pre-Op Diagnosis: Retained External fixator to left lower extremity    Post-Op Diagnosis: Same       Procedure(s):  Removal of left tibia max frame (CPT 13347)    Surgeon(s):  Peter Wilson DO     Assistant:  Resident: Laura Bliss DO; Conrad Doss DO     Anesthesia: Monitor Anesthesia Care     Estimated Blood Loss (mL): <5 mL     Complications: None     Specimens:   * No specimens in log *     Implants:  Implant Name Type Inv. Item Serial No.  Lot No. LRB No. Used Action   External Fixator Pins           Left 8 Explanted          Drains: * No LDAs found *    Surgical Indications:  Roxann Zeng is a 77 y.o. old female who presented with left pilon fracture who was initially placed into a ring external fixator and subsequent ORIF. Patient at time of last visit was approximately 11 weeks out from her initial injury and show signs of healing on imaging thus plan was made to remove the left ring external fixator today she may begin range of motion to the ankle. Following a discussion with the patient regarding both non-operative and operative treatment options, they consented to proceed with left ring external fixator removal. She came to this decision after demonstrating an understanding of our discussion regarding details of the procedure, risks and benefits, expected outcome, and postoperative course. Operative technique: Following appropriate identification of the patient and her operative extremity, consent was reviewed with the patient and Her operative extremity was signed. She was wheeled to the operating room where she finished a course of pre-operative antibiotic prophylaxis by way of ancef. The anesthesia service induced light MAC anesthesia without complication.  All bony prominences were appropriately padded and the patient was secured to the operative table in a supine position. The patient's operative extremity was draped with a nonsterile blue drape. Once timeout was performed we then proceeded with our case. We performed the procedure in a semisterile fashion utilizing Ex-Fix removal set the bolts were loosened along the Schanz pins and all of wires. The olive wires/Schanz pins were removed manually without complication. The frame was completely removed from the leg following removal of all pins. Fluoroscopy was then utilized which demonstrated no retained max frame hardware. The internal implants were noted to be intact without breakage. The pilon fractures redemonstrated showing stable alignment. Following this, the pin sites were dressed in sterile Xeroform, ABDs and fluffs then wrapped in an Ace wrap. Patient was woken from anesthesia without complication wheeled to the PACU in stable condition. Postoperative plan:  Plan is for patient to continue nonweightbearing to the left lower extremity until her next visit which will be in 2 weeks. Patient was encouraged to perform ankle range of motion so that she may regain her function. We will see her back at the scheduled date for wound check at that time and evaluation of her motion. Myriam Cruz DO  Orthopedic Surgery Resident, PGY-3  [address]    I was present for critical portions of this procedure. The pt underwent Removal of external fixator from left lower extremity with no intraoperative or immediate postoperative complications. Any necessary corrections to the dictation were made. I agree with the operative report as listed above.      Klye Webster DO   05/25/22 11:42 AM      Electronically signed by Estela Brenner DO on 4/28/2022 at 8:34 AM

## 2022-05-02 DIAGNOSIS — S82.872D CLOSED DISPLACED PILON FRACTURE OF LEFT TIBIA WITH ROUTINE HEALING, SUBSEQUENT ENCOUNTER: ICD-10-CM

## 2022-05-02 RX ORDER — GABAPENTIN 100 MG/1
100 CAPSULE ORAL 3 TIMES DAILY
Qty: 42 CAPSULE | Refills: 0 | Status: SHIPPED | OUTPATIENT
Start: 2022-05-02 | End: 2022-05-16

## 2022-05-02 RX ORDER — METHOCARBAMOL 750 MG/1
750 TABLET, FILM COATED ORAL 3 TIMES DAILY PRN
Qty: 30 TABLET | Refills: 0 | Status: SHIPPED | OUTPATIENT
Start: 2022-05-02 | End: 2022-05-26 | Stop reason: SDUPTHER

## 2022-05-02 NOTE — TELEPHONE ENCOUNTER
PATIENT REQUESTING A REFILL OF PENDING MEDS.     Procedure(s): 04/28/2022  Removal of left tibia max frame (CPT 46908)

## 2022-05-11 DIAGNOSIS — S82.872D CLOSED DISPLACED PILON FRACTURE OF LEFT TIBIA WITH ROUTINE HEALING, SUBSEQUENT ENCOUNTER: ICD-10-CM

## 2022-05-11 RX ORDER — HYDROCODONE BITARTRATE AND ACETAMINOPHEN 5; 325 MG/1; MG/1
1 TABLET ORAL EVERY 8 HOURS PRN
Qty: 21 TABLET | Refills: 0 | Status: SHIPPED | OUTPATIENT
Start: 2022-05-11 | End: 2022-05-19 | Stop reason: SDUPTHER

## 2022-05-11 NOTE — TELEPHONE ENCOUNTER
Patient is requesting a refill of pain med. I also titrated her down to every 8 hours instead of every 6 hours.     Procedure(s): 04/27/2022  Removal of left tibia max frame (CPT 75939

## 2022-05-13 ENCOUNTER — OFFICE VISIT (OUTPATIENT)
Dept: ORTHOPEDIC SURGERY | Age: 77
End: 2022-05-13

## 2022-05-13 VITALS — BODY MASS INDEX: 40.75 KG/M2 | WEIGHT: 230 LBS | HEIGHT: 63 IN

## 2022-05-13 DIAGNOSIS — E55.9 VITAMIN D DEFICIENCY: Primary | ICD-10-CM

## 2022-05-13 DIAGNOSIS — S82.872D CLOSED DISPLACED PILON FRACTURE OF LEFT TIBIA WITH ROUTINE HEALING, SUBSEQUENT ENCOUNTER: ICD-10-CM

## 2022-05-13 PROCEDURE — 99024 POSTOP FOLLOW-UP VISIT: CPT | Performed by: NURSE PRACTITIONER

## 2022-05-13 RX ORDER — METHOCARBAMOL 750 MG/1
750 TABLET, FILM COATED ORAL 3 TIMES DAILY
Qty: 30 TABLET | Refills: 0 | Status: SHIPPED | OUTPATIENT
Start: 2022-05-13 | End: 2022-07-27

## 2022-05-13 RX ORDER — GABAPENTIN 300 MG/1
300 CAPSULE ORAL 3 TIMES DAILY
Qty: 42 CAPSULE | Refills: 0 | Status: SHIPPED | OUTPATIENT
Start: 2022-05-13 | End: 2022-05-31 | Stop reason: SDUPTHER
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
## 2022-05-19 DIAGNOSIS — S82.872D CLOSED DISPLACED PILON FRACTURE OF LEFT TIBIA WITH ROUTINE HEALING, SUBSEQUENT ENCOUNTER: ICD-10-CM

## 2022-05-19 RX ORDER — HYDROCODONE BITARTRATE AND ACETAMINOPHEN 5; 325 MG/1; MG/1
1 TABLET ORAL EVERY 8 HOURS PRN
Qty: 21 TABLET | Refills: 0 | Status: SHIPPED | OUTPATIENT
Start: 2022-05-19 | End: 2022-05-26

## 2022-05-19 NOTE — TELEPHONE ENCOUNTER
Date of Procedure: 4/27/2022     Pre-Op Diagnosis: Retained hardware to left tibia     Post-Op Diagnosis: Retained hardware to left tibia        Procedure(s):  Removal of left tibia max frame (CPT 92524)       Refill request pended.

## 2022-05-26 DIAGNOSIS — S82.872D CLOSED DISPLACED PILON FRACTURE OF LEFT TIBIA WITH ROUTINE HEALING, SUBSEQUENT ENCOUNTER: ICD-10-CM

## 2022-05-26 RX ORDER — METHOCARBAMOL 750 MG/1
750 TABLET, FILM COATED ORAL 2 TIMES DAILY PRN
Qty: 20 TABLET | Refills: 0 | Status: SHIPPED | OUTPATIENT
Start: 2022-05-26 | End: 2022-06-02 | Stop reason: SDUPTHER

## 2022-05-31 DIAGNOSIS — S82.872D CLOSED DISPLACED PILON FRACTURE OF LEFT TIBIA WITH ROUTINE HEALING, SUBSEQUENT ENCOUNTER: ICD-10-CM

## 2022-05-31 RX ORDER — GABAPENTIN 300 MG/1
300 CAPSULE ORAL 3 TIMES DAILY
Qty: 42 CAPSULE | Refills: 0 | Status: SHIPPED | OUTPATIENT
Start: 2022-05-31 | End: 2022-07-27

## 2022-05-31 NOTE — TELEPHONE ENCOUNTER
Patient requesting a refill of pending medication      Date of Procedure: 4/27/2022     Procedure(s):  Removal of left tibia max frame (JCS 32288)

## 2022-06-02 DIAGNOSIS — S82.872D CLOSED DISPLACED PILON FRACTURE OF LEFT TIBIA WITH ROUTINE HEALING, SUBSEQUENT ENCOUNTER: ICD-10-CM

## 2022-06-02 RX ORDER — METHOCARBAMOL 750 MG/1
750 TABLET, FILM COATED ORAL 2 TIMES DAILY PRN
Qty: 14 TABLET | Refills: 0 | Status: SHIPPED | OUTPATIENT
Start: 2022-06-02 | End: 2022-06-13 | Stop reason: SDUPTHER

## 2022-06-02 NOTE — TELEPHONE ENCOUNTER
Patient is requesting a refill of  Pending medication    Procedure(s): 04/27/2022  Removal of left tibia max frame (CPT 53365)

## 2022-06-07 DIAGNOSIS — S82.872D CLOSED DISPLACED PILON FRACTURE OF LEFT TIBIA WITH ROUTINE HEALING, SUBSEQUENT ENCOUNTER: ICD-10-CM

## 2022-06-07 RX ORDER — METHOCARBAMOL 750 MG/1
750 TABLET, FILM COATED ORAL 2 TIMES DAILY PRN
Qty: 14 TABLET | Refills: 0 | OUTPATIENT
Start: 2022-06-07 | End: 2022-06-14

## 2022-06-13 DIAGNOSIS — S82.872D CLOSED DISPLACED PILON FRACTURE OF LEFT TIBIA WITH ROUTINE HEALING, SUBSEQUENT ENCOUNTER: Primary | ICD-10-CM

## 2022-06-13 RX ORDER — METHOCARBAMOL 750 MG/1
750 TABLET, FILM COATED ORAL 2 TIMES DAILY PRN
Qty: 14 TABLET | Refills: 0 | Status: SHIPPED | OUTPATIENT
Start: 2022-06-13 | End: 2022-07-05 | Stop reason: SDUPTHER

## 2022-06-14 ENCOUNTER — OFFICE VISIT (OUTPATIENT)
Dept: ORTHOPEDIC SURGERY | Age: 77
End: 2022-06-14

## 2022-06-14 DIAGNOSIS — S82.872D CLOSED DISPLACED PILON FRACTURE OF LEFT TIBIA WITH ROUTINE HEALING, SUBSEQUENT ENCOUNTER: Primary | ICD-10-CM

## 2022-06-14 PROCEDURE — 99024 POSTOP FOLLOW-UP VISIT: CPT | Performed by: ORTHOPAEDIC SURGERY

## 2022-06-14 RX ORDER — GABAPENTIN 100 MG/1
300 CAPSULE ORAL 3 TIMES DAILY
Qty: 189 CAPSULE | Refills: 0 | Status: SHIPPED | OUTPATIENT
Start: 2022-06-14 | End: 2022-07-05

## 2022-06-14 NOTE — PROGRESS NOTES
[address] [facility]  [address] [place] [address]  Dept: [phone]  Dept Fax: [phone]        Orthopaedic Trauma Clinic Follow Up      Subjective:   Date of Surgery:   4/27/2022: Removal of left tibia max frame    3/16/2022: ORIF left pilon fracture and IMN of left fibula     3/2/2022: ex fix pin exchange      2/10/2022: closed reduction left pilon fracture and application of [place] ex fix    Violette Arango is a 77y.o. year old female who presents to the clinic today for routine followup regarding her 6 week follow-up of removal of max frame of left tibia, 12 weeks post ORIF (3/16/22) and 18 weeks post injury. Patient is doing well and has been compliant with NWB of LLE. Patient has been using a wheelchair for mobility and occasionally bears weight on LLE with transfers. Patient does report occasional shooting pain from ankle up left leg. Patient utilizes tylenol and naproxen for pain relief. Patient denies any new injuries/falls. No other orthopedic complaints at this time. Review of Systems  Gen: no fever, chills, malaise  CV: no chest pain or palpitations  Resp: no cough or shortness of breath  GI: no nausea, vomiting, diarrhea, or constipation  Neuro: no numbness, tingling, or weakness  Msk: Left ankle stiffness and mild swelling  10 remaining systems reviewed and negative    Objective : There were no vitals filed for this visit. There is no height or weight on file to calculate BMI. General: No acute distress, resting comfortably in the clinic  Neuro: alert. oriented  Eyes: Extra-ocular muscles intact  Pulm: Respirations unlabored and regular. Skin: warm, well perfused  Psych:   Patient has good fund of knowledge and displays understanging of exam, diagnosis, and plan. MSK:    LLE: Sking with healing incisions and pin site, no signs of erythema or cellulitic changes are noted.  Mild swelling noted diffusely about the lower leg, no concern for cellulitis or pitting edema. PF 30, DF neutral. Patient reported surgical serosanguinous drainage 1 wk ago, resolved. Patient has dysesthesias to the plantar aspect of her foot from the heel to the toes which is at baseline. Compartments soft. 2+ DP pulse. TA/EHL/FHL/GS motor intact. Deep and Superficial Peroneal/Saphenous/Sural SILT. Radiology:  History: Left pilon fracture status post ORIF and circular external fixator placement removal    Comparison: 4/27/2022    Findings: 3 views of the left ankle showing retained hardware without signs of loosening or loss of alignment when compared to previous films. Relative anatomic alignment maintained with interval healing and mild callous formation. Significant degenerative arthritic changes noted. Impression: Stable left pilon fracture s/p ORIF with interval healing     Assessment:   77y.o. year old female with Left pilon fracture status post ORIF and circular external fixator placement removal  Plan:      Patient presents today for evaluation and radiograph review. Films reviewed with patient. Discussed potential transition to boot on left ankle as well as with walker for assistance to maintain toe-touch weightbearing initially and progress to weightbearing as tolerated. Encourage patient utilize compressive stockings to assist with swelling as well as ice and elevation when resting. Patient encouraged to use Tylenol and iIbuprofen as needed for pain relief. Patient provided with refill of Gabapentin. Instructed to wean. Provided patient with referral to PT. Encouraged patient to begin taking Vitamin D and Calcium, last vitamin D 9.4 on 2/9/22. Patient directed to follow-up in 6 weeks for reevaluation and just treatment plan. Patient understood and agreed with the plan with all questions being answered to the patient's satisfaction at the time of visit.     Follow up:Return in about 6 weeks (around 7/26/2022) for evaluation with x-rays OUT of cast/splint/brace. Orders Placed This Encounter   Medications    gabapentin (NEURONTIN) 100 MG capsule     Sig: Take 3 capsules by mouth 3 times daily for 21 days.      Dispense:  189 capsule     Refill:  0          Orders Placed This Encounter   Procedures    XR ANKLE LEFT (MIN 3 VIEWS)     Standing Status:   Future     Number of Occurrences:   1     Standing Expiration Date:   6/14/2023   [facility]     Referral Priority:   Routine     Referral Type:   Eval and Treat     Referral Reason:   Specialty Services Required     Requested Specialty:   Physical Therapist     Number of Visits Requested:   1           Electronically signed by Raad Harris DO on 6/14/2022 at 12:17 PM

## 2022-06-24 ENCOUNTER — TELEPHONE (OUTPATIENT)
Dept: ORTHOPEDIC SURGERY | Age: 77
End: 2022-06-24
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
## 2022-06-24 NOTE — TELEPHONE ENCOUNTER
pts daughter called in stating she missed a called from the office. Writer did not see any notes as to why a call would have been made.

## 2022-07-05 DIAGNOSIS — S82.872D CLOSED DISPLACED PILON FRACTURE OF LEFT TIBIA WITH ROUTINE HEALING, SUBSEQUENT ENCOUNTER: ICD-10-CM

## 2022-07-05 RX ORDER — METHOCARBAMOL 750 MG/1
750 TABLET, FILM COATED ORAL 2 TIMES DAILY PRN
Qty: 14 TABLET | Refills: 0 | Status: SHIPPED | OUTPATIENT
Start: 2022-07-05 | End: 2022-07-18 | Stop reason: SDUPTHER

## 2022-07-18 ENCOUNTER — TELEPHONE (OUTPATIENT)
Dept: ORTHOPEDIC SURGERY | Age: 77
End: 2022-07-18

## 2022-07-18 DIAGNOSIS — S82.872D CLOSED DISPLACED PILON FRACTURE OF LEFT TIBIA WITH ROUTINE HEALING, SUBSEQUENT ENCOUNTER: ICD-10-CM

## 2022-07-18 RX ORDER — METHOCARBAMOL 750 MG/1
750 TABLET, FILM COATED ORAL 2 TIMES DAILY PRN
Qty: 14 TABLET | Refills: 0 | Status: SHIPPED | OUTPATIENT
Start: 2022-07-18 | End: 2022-07-25

## 2022-07-18 NOTE — PROGRESS NOTES
Date of Surgery:      4/27/2022: Removal of ex-fix      3/16/2022: ORIF left pilon fracture and IMN of left fibula     3/2/2022: ex fix pin exchange      2/10/2022: closed reduction left pilon fracture and application of [place] ex fix

## 2022-07-19 DIAGNOSIS — S82.872D CLOSED DISPLACED PILON FRACTURE OF LEFT TIBIA WITH ROUTINE HEALING, SUBSEQUENT ENCOUNTER: Primary | ICD-10-CM

## 2022-07-21 ENCOUNTER — OFFICE VISIT (OUTPATIENT)
Dept: ORTHOPEDIC SURGERY | Age: 77
End: 2022-07-21
Payer: MEDICARE

## 2022-07-21 VITALS — BODY MASS INDEX: 40.75 KG/M2 | HEIGHT: 63 IN | WEIGHT: 230 LBS

## 2022-07-21 DIAGNOSIS — E55.9 VITAMIN D DEFICIENCY: ICD-10-CM

## 2022-07-21 DIAGNOSIS — M25.562 ACUTE PAIN OF LEFT KNEE: Primary | ICD-10-CM

## 2022-07-21 DIAGNOSIS — S82.872D CLOSED DISPLACED PILON FRACTURE OF LEFT TIBIA WITH ROUTINE HEALING, SUBSEQUENT ENCOUNTER: ICD-10-CM

## 2022-07-21 PROCEDURE — 99024 POSTOP FOLLOW-UP VISIT: CPT

## 2022-07-21 PROCEDURE — 20610 DRAIN/INJ JOINT/BURSA W/O US: CPT
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
## 2022-07-22 RX ORDER — LIDOCAINE HYDROCHLORIDE 10 MG/ML
5 INJECTION, SOLUTION INFILTRATION; PERINEURAL ONCE
Status: SHIPPED | OUTPATIENT
Start: 2022-07-22

## 2022-07-22 RX ORDER — BUPIVACAINE HYDROCHLORIDE 2.5 MG/ML
5 INJECTION, SOLUTION INFILTRATION; PERINEURAL ONCE
Status: SHIPPED | OUTPATIENT
Start: 2022-07-22

## 2022-07-26 DIAGNOSIS — S82.872D CLOSED DISPLACED PILON FRACTURE OF LEFT TIBIA WITH ROUTINE HEALING, SUBSEQUENT ENCOUNTER: ICD-10-CM

## 2022-07-26 RX ORDER — METHOCARBAMOL 750 MG/1
750 TABLET, FILM COATED ORAL 2 TIMES DAILY PRN
Qty: 14 TABLET | Refills: 0 | Status: CANCELLED | OUTPATIENT
Start: 2022-07-26 | End: 2022-08-02

## 2022-07-26 RX ORDER — GABAPENTIN 100 MG/1
300 CAPSULE ORAL 3 TIMES DAILY
Qty: 189 CAPSULE | Refills: 0 | Status: CANCELLED | OUTPATIENT
Start: 2022-07-26 | End: 2022-08-16

## 2022-07-26 NOTE — TELEPHONE ENCOUNTER
Medication  request pending  Date of Surgery:   4/27/2022: Removal of left tibia max frame     3/16/2022: ORIF left pilon fracture and IMN of left fibula     3/2/2022: ex fix pin exchange      2/10/2022: closed reduction left pilon fracture and application of [place] ex fix

## 2022-07-27 RX ORDER — GABAPENTIN 100 MG/1
300 CAPSULE ORAL 3 TIMES DAILY
Qty: 189 CAPSULE | Refills: 0 | OUTPATIENT
Start: 2022-07-27 | End: 2022-08-17

## 2022-07-27 RX ORDER — GABAPENTIN 300 MG/1
CAPSULE ORAL
Qty: 42 CAPSULE | Refills: 0 | Status: SHIPPED | OUTPATIENT
Start: 2022-07-27 | End: 2022-08-16 | Stop reason: SDUPTHER

## 2022-07-27 RX ORDER — METHOCARBAMOL 750 MG/1
TABLET, FILM COATED ORAL
Qty: 30 TABLET | Refills: 0 | Status: SHIPPED | OUTPATIENT
Start: 2022-07-27 | End: 2022-08-16 | Stop reason: SDUPTHER

## 2022-07-27 RX ORDER — METHOCARBAMOL 750 MG/1
750 TABLET, FILM COATED ORAL 2 TIMES DAILY PRN
Qty: 14 TABLET | Refills: 0 | OUTPATIENT
Start: 2022-07-27 | End: 2022-08-03

## 2022-08-08 NOTE — DISCHARGE INSTR - COC
Continuity of Care Form    Patient Name: Ranjit Perry   :  [birth year]  MRN:  [id number]    Admit date:  2022  Discharge date:  ***    Code Status Order: Full Code   Advance Directives:      Admitting Physician:  Jean Paul Sultana MD  PCP: No primary care provider on file. Discharging Nurse: [facility] Unit/Room#: 5234/7216-03  Discharging Unit Phone Number: ***    Emergency Contact:   No emergency contact information on file. Past Surgical History:  Past Surgical History:   Procedure Laterality Date    JOINT REPLACEMENT         Immunization History: There is no immunization history on file for this patient. Active Problems:  Patient Active Problem List   Diagnosis Code    Tibia/fibula fracture, shaft, unspecified laterality, closed, initial encounter S82.209A, S82.409A       Isolation/Infection:   Isolation            No Isolation          Patient Infection Status       Infection Onset Added Last Indicated Last Indicated By Review Planned Expiration Resolved Resolved By    None active    Resolved    COVID-19 (Rule Out) 22 COVID-19, Rapid (Ordered)   22 Rule-Out Test Resulted            Nurse Assessment:  Last Vital Signs: BP (!) 153/89   Pulse 69   Temp 98.1 °F (36.7 °C)   Resp 18   SpO2 96%     Last documented pain score (0-10 scale): Pain Level: 10  Last Weight:   Wt Readings from Last 1 Encounters:   No data found for Wt     Mental Status:  oriented, alert, coherent, logical, thought processes intact, and able to concentrate and follow conversation    IV Access:  - None    Nursing Mobility/ADLs:  Walking   Dependent  Transfer  Assisted  Bathing  Assisted  Dressing  Assisted  Toileting  Assisted  Feeding  [facility] Delivery   whole    Wound Care Documentation and Therapy:        Elimination:  Continence:    Bowel: Yes  Bladder: Yes  Urinary Catheter: None   Colostomy/Ileostomy/Ileal Conduit: No       Date of Last BM: ***  No intake or output data in the 24 hours ending 02/09/22 1458  No intake/output data recorded. Safety Concerns: At Risk for Falls    Impairments/Disabilities:      None    Nutrition Therapy:  Current Nutrition Therapy:   - Oral Diet:  Carb Control 3 carbs/meal (1500kcals/day)    Routes of Feeding: Oral  Liquids: Thin Liquids  Daily Fluid Restriction: no  Last Modified Barium Swallow with Video (Video Swallowing Test): not done    Treatments at the Time of Hospital Discharge:   Respiratory Treatments: ***  Oxygen Therapy:  is not on home oxygen therapy. Ventilator:    - No ventilator support    Rehab Therapies: Physical Therapy  Weight Bearing Status/Restrictions: Non-weight bearing on left leg  Other Medical Equipment (for information only, NOT a DME order):  wheelchair  Other Treatments: ***    Patient's personal belongings (please select all that are sent with patient):  None    RN SIGNATURE:  Electronically signed by Ayaan Langley RN on 2/11/22 at 5:17 PM EST    CASE MANAGEMENT/SOCIAL WORK SECTION    Inpatient Status Date: ***    Readmission Risk Assessment Score:  Readmission Risk              Risk of Unplanned Readmission:  9           Discharging to Facility/ Agency   Name:   [facility]  [facility]       Phone: [phone]       Fax: [phone]          / signature: Electronically signed by Lazaro Billy RN on 2/11/22 at 4:58 PM EST    PHYSICIAN SECTION    Prognosis: Good    Condition at Discharge: Stable    Rehab Potential (if transferring to Rehab): {Prognosis:[id number]}    Recommended Labs or Other Treatments After Discharge: ***    Physician Certification: I certify the above information and transfer of Shekhar Flynn  is necessary for the continuing treatment of the diagnosis listed and that she requires {Admit to Appropriate Level of Care:88448} for less 30 days.      Update Admission H&P: No change in Detail Level: Simple

## 2022-08-16 DIAGNOSIS — S82.872D CLOSED DISPLACED PILON FRACTURE OF LEFT TIBIA WITH ROUTINE HEALING, SUBSEQUENT ENCOUNTER: ICD-10-CM

## 2022-08-16 RX ORDER — METHOCARBAMOL 750 MG/1
750 TABLET, FILM COATED ORAL 2 TIMES DAILY PRN
Qty: 14 TABLET | Refills: 0 | Status: SHIPPED | OUTPATIENT
Start: 2022-08-16 | End: 2022-09-06 | Stop reason: SDUPTHER

## 2022-08-16 RX ORDER — GABAPENTIN 300 MG/1
300 CAPSULE ORAL 3 TIMES DAILY
Qty: 42 CAPSULE | Refills: 0 | Status: SHIPPED | OUTPATIENT
Start: 2022-08-16 | End: 2022-09-19 | Stop reason: SDUPTHER

## 2022-08-16 NOTE — TELEPHONE ENCOUNTER
Medication  request pending  Date of Surgery:   4/27/2022: Removal of left tibia max frame     3/16/2022: ORIF left pilon fracture and IMN of left fibula     3/2/2022: ex fix pin exchange      2/10/2022: closed reduction left pilon fracture and application of [place] ex fix

## 2022-08-22 ENCOUNTER — TELEPHONE (OUTPATIENT)
Dept: ORTHOPEDIC SURGERY | Age: 77
End: 2022-08-22

## 2022-08-22 DIAGNOSIS — S82.872D CLOSED DISPLACED PILON FRACTURE OF LEFT TIBIA WITH ROUTINE HEALING, SUBSEQUENT ENCOUNTER: Primary | ICD-10-CM

## 2022-08-22 NOTE — TELEPHONE ENCOUNTER
The patient missed a call from [facility] ([facility]). The automated message line called to confirm your appointment for August 23. confirmed appointment with the patient.

## 2022-08-23 ENCOUNTER — OFFICE VISIT (OUTPATIENT)
Dept: ORTHOPEDIC SURGERY | Age: 77
End: 2022-08-23
Payer: MEDICARE

## 2022-08-23 VITALS — BODY MASS INDEX: 40.75 KG/M2 | WEIGHT: 230 LBS | HEIGHT: 63 IN

## 2022-08-23 DIAGNOSIS — S82.872D CLOSED DISPLACED PILON FRACTURE OF LEFT TIBIA WITH ROUTINE HEALING, SUBSEQUENT ENCOUNTER: Primary | ICD-10-CM

## 2022-08-23 PROCEDURE — 1123F ACP DISCUSS/DSCN MKR DOCD: CPT | Performed by: NURSE PRACTITIONER

## 2022-08-23 PROCEDURE — 99213 OFFICE O/P EST LOW 20 MIN: CPT | Performed by: NURSE PRACTITIONER

## 2022-08-23 RX ORDER — METHOCARBAMOL 750 MG/1
750 TABLET, FILM COATED ORAL 2 TIMES DAILY PRN
Qty: 20 TABLET | Refills: 0 | Status: SHIPPED | OUTPATIENT
Start: 2022-08-23 | End: 2022-09-02

## 2022-08-23 NOTE — PROGRESS NOTES
pain or palpitations  Resp: no cough or shortness of breath  GI: no nausea, vomiting, diarrhea, or constipation  Neuro: no seizures, vertigo, or headache  Msk: left ankle and left knee pain   10 remaining systems reviewed and negative    Objective : There were no vitals filed for this visit. Body mass index is 40.74 kg/m². General: No acute distress, resting comfortably in the clinic  Neuro: alert. oriented  Eyes: Extra-ocular muscles intact  Pulm: Respirations unlabored and regular. Skin: warm, well perfused  Psych:   Patient has good fund of knowledge and displays understanding of exam, diagnosis, and plan. LLE:  Skin intact. Previous surgical incisions well approximated and healed without evidence of infection. TTP medial anterior aspect of the ankle. TTP medial knee joint line. Approximately 5 degrees of dorsiflexion and 15 degrees of plantar flexion. Anterior medial ankle pain with resisted inversion and eversion of the ankle. Compartments soft. 2+ DP pulse. TA/EHL/FHL/GS motor intact. Deep and Superficial Peroneal/Saphenous/Sural SILT with dysesthesias to plantar aspect of foot from toes to heels- remains at baseline. Radiology:  History: ORIF left pilon fracture    Comparison: 7/21/2022    Findings: 3 views of the left ankle (AP, mortise, lateral) in a skeletally mature patient showing internal fixation of a pilon fracture with increased bony callus formation compared to previous imaging. No orthopedic hardware complications or loss of fixation. No acute fractures or dislocations. No evidence of the talus collapsing into the tibia. Degenerative arthritic changes again noted. Impression: Stable ORIF left pilon fracture with interval healing. Assessment:   77y.o. year old female with ORIF left pilon fracture; DOS: 3/16/2022. Plan:   - Overall patient continues to improve in regards to pain, function, mobility of the left ankle.  Patient would benefit from continuing PT, provided a note to avoid rotational and inversion/eversion exercises of the ankle for two weeks to see if this calms the ankle down and makes therapy more tolerable. After two weeks, may slowly introduce these exercises again but if they continue to cause pain, advised to permanently remove them from her therapy program. Recommend wearing good supportive tennis shoes when weight bearing, walking on even pavement/floors, and to wear the brace as needed for support/stability of the ankle. - F/u in 3 months w/ x-rays     Follow up:Return in about 3 months (around 11/23/2022) for x-rays. Orders Placed This Encounter   Medications    methocarbamol (ROBAXIN-750) 750 MG tablet     Sig: Take 1 tablet by mouth 2 times daily as needed (muscle spasms)     Dispense:  20 tablet     Refill:  0          No orders of the defined types were placed in this encounter. Electronically signed by SHONA Vogel CNP on 8/23/2022 at 1:05 PM    This note is created with the assistance of a speech recognition program.  While intending to generate a document that actually reflects the content of the visit, the document can still have some errors including those of syntax and sound a like substitutions which may escape proof reading.   In such instances, actual meaning can be extrapolated by contextual diversion

## 2022-08-23 NOTE — LETTER
[facility]  [facility] [facility] [address]  Phone: [phone]  Fax: [phone]    Kenny Schaefer DO        August 23, 2022     Patient: Jeromy Bell   YOB: 1945   Date of Visit: 8/23/2022       To Whom it May Concern:    Darling Henderson was seen in my clinic on 8/23/2022. Please avoid rotational and inversion/eversion exercises for a two week trial as these are extremely irritable for the patient. At that time, okay to slowly reincorporate if tolerating well. If these continue to remain significantly troublesome, then discontinue these exercises permanently. If you have any questions or concerns, please don't hesitate to call.     Sincerely,         Kenny Schaefer DO

## 2022-09-06 DIAGNOSIS — S82.872D CLOSED DISPLACED PILON FRACTURE OF LEFT TIBIA WITH ROUTINE HEALING, SUBSEQUENT ENCOUNTER: ICD-10-CM

## 2022-09-06 RX ORDER — METHOCARBAMOL 750 MG/1
750 TABLET, FILM COATED ORAL 2 TIMES DAILY PRN
Qty: 14 TABLET | Refills: 0 | Status: SHIPPED | OUTPATIENT
Start: 2022-09-06 | End: 2022-09-16 | Stop reason: SDUPTHER

## 2022-09-06 NOTE — TELEPHONE ENCOUNTER
Medication  request pending  Date of Surgery:   4/27/2022: Removal of left tibia max frame     3/16/2022: ORIF left pilon fracture and IMN of left fibula     3/2/2022: ex fix pin exchange      2/10/2022: closed reduction left pilon fracture and application of [place] ex fix

## 2022-09-16 DIAGNOSIS — S82.872D CLOSED DISPLACED PILON FRACTURE OF LEFT TIBIA WITH ROUTINE HEALING, SUBSEQUENT ENCOUNTER: ICD-10-CM

## 2022-09-16 RX ORDER — METHOCARBAMOL 750 MG/1
750 TABLET, FILM COATED ORAL 2 TIMES DAILY PRN
Qty: 14 TABLET | Refills: 0 | Status: SHIPPED | OUTPATIENT
Start: 2022-09-16 | End: 2022-10-03

## 2022-09-16 RX ORDER — METHOCARBAMOL 750 MG/1
750 TABLET, FILM COATED ORAL 2 TIMES DAILY PRN
Qty: 20 TABLET | Refills: 0 | Status: CANCELLED | OUTPATIENT
Start: 2022-09-16 | End: 2022-09-26

## 2022-09-16 NOTE — TELEPHONE ENCOUNTER
The patient left a voicemail about  getting a refill on pain medication    Last office visit: 8/23/22   Date of Surgery:   4/27/2022: Removal of left tibia max frame  3/16/2022: ORIF left pilon fracture and IMN of left fibula  3/2/2022: ex fix pin exchange   2/10/2022: closed reduction left pilon fracture and application of [place] ex fix      Medication request pending

## 2022-09-19 DIAGNOSIS — S82.872D CLOSED DISPLACED PILON FRACTURE OF LEFT TIBIA WITH ROUTINE HEALING, SUBSEQUENT ENCOUNTER: ICD-10-CM

## 2022-09-19 RX ORDER — GABAPENTIN 300 MG/1
300 CAPSULE ORAL 3 TIMES DAILY
Qty: 21 CAPSULE | Refills: 0 | Status: SHIPPED | OUTPATIENT
Start: 2022-09-19 | End: 2022-10-03

## 2022-09-19 NOTE — TELEPHONE ENCOUNTER
Last office visit: 8/23/22   Date of Surgery:   4/27/2022: Removal of left tibia max frame  3/16/2022: ORIF left pilon fracture and IMN of left fibula  3/2/2022: ex fix pin exchange   2/10/2022: closed reduction left pilon fracture and application of [place] ex fix       Medication request pending

## 2022-10-01 DIAGNOSIS — S82.872D CLOSED DISPLACED PILON FRACTURE OF LEFT TIBIA WITH ROUTINE HEALING, SUBSEQUENT ENCOUNTER: ICD-10-CM

## 2022-10-03 RX ORDER — METHOCARBAMOL 750 MG/1
TABLET, FILM COATED ORAL
Qty: 14 TABLET | Refills: 0 | Status: SHIPPED | OUTPATIENT
Start: 2022-10-03 | End: 2022-10-12

## 2022-10-03 RX ORDER — GABAPENTIN 300 MG/1
CAPSULE ORAL
Qty: 42 CAPSULE | Refills: 0 | Status: SHIPPED | OUTPATIENT
Start: 2022-10-03 | End: 2022-10-26

## 2022-10-03 NOTE — TELEPHONE ENCOUNTER
4/27/2022: Removal of left tibia max frame     3/16/2022: ORIF left pilon fracture and IMN of left fibula     3/2/2022: ex fix pin exchange      2/10/2022: closed reduction left pilon fracture and application of [place] ex fix    Refill request pended.

## 2022-10-04 ENCOUNTER — TELEPHONE (OUTPATIENT)
Dept: ORTHOPEDIC SURGERY | Age: 77
End: 2022-10-04

## 2022-10-04 NOTE — TELEPHONE ENCOUNTER
Patient called in stating that PT is too painful, inquiring if doing exercises at home for one month to see if that elevates her discomfort then resuming outpatient PT. Patient is also inquiring if you would recommend more appropriate footwear that better supports the ankle. Informed patient she may need an appointment to address her concerns. Please advise.

## 2022-10-05 NOTE — TELEPHONE ENCOUNTER
That is fine as long as she feels like she can do her own home exercises and continue making process. She can go into any running store like [employer] and get fitted for an a supportive shoe. I don't have any specific recommendations on shoes.

## 2022-10-12 DIAGNOSIS — S82.872D CLOSED DISPLACED PILON FRACTURE OF LEFT TIBIA WITH ROUTINE HEALING, SUBSEQUENT ENCOUNTER: ICD-10-CM

## 2022-10-12 RX ORDER — METHOCARBAMOL 750 MG/1
750 TABLET, FILM COATED ORAL 2 TIMES DAILY PRN
Qty: 28 TABLET | Refills: 0 | Status: SHIPPED | OUTPATIENT
Start: 2022-10-12 | End: 2022-10-26

## 2022-10-12 NOTE — TELEPHONE ENCOUNTER
4/27/2022: Removal of left tibia max frame     3/16/2022: ORIF left pilon fracture and IMN of left fibula     3/2/2022: ex fix pin exchange      2/10/2022: closed reduction left pilon fracture and application of [place] ex fix    Refill pended

## 2022-10-26 DIAGNOSIS — S82.872D CLOSED DISPLACED PILON FRACTURE OF LEFT TIBIA WITH ROUTINE HEALING, SUBSEQUENT ENCOUNTER: ICD-10-CM

## 2022-10-26 RX ORDER — GABAPENTIN 300 MG/1
CAPSULE ORAL
Qty: 42 CAPSULE | Refills: 0 | Status: SHIPPED | OUTPATIENT
Start: 2022-10-26 | End: 2022-11-16

## 2022-10-26 RX ORDER — METHOCARBAMOL 750 MG/1
TABLET, FILM COATED ORAL
Qty: 14 TABLET | Refills: 0 | Status: SHIPPED | OUTPATIENT
Start: 2022-10-26 | End: 2022-11-04

## 2022-11-04 DIAGNOSIS — S82.872D CLOSED DISPLACED PILON FRACTURE OF LEFT TIBIA WITH ROUTINE HEALING, SUBSEQUENT ENCOUNTER: ICD-10-CM

## 2022-11-04 RX ORDER — METHOCARBAMOL 750 MG/1
TABLET, FILM COATED ORAL
Qty: 14 TABLET | Refills: 0 | Status: SHIPPED | OUTPATIENT
Start: 2022-11-04 | End: 2022-11-16

## 2022-11-16 DIAGNOSIS — S82.872D CLOSED DISPLACED PILON FRACTURE OF LEFT TIBIA WITH ROUTINE HEALING, SUBSEQUENT ENCOUNTER: ICD-10-CM

## 2022-11-16 RX ORDER — METHOCARBAMOL 750 MG/1
TABLET, FILM COATED ORAL
Qty: 14 TABLET | Refills: 0 | Status: SHIPPED | OUTPATIENT
Start: 2022-11-16 | End: 2022-11-28

## 2022-11-16 RX ORDER — GABAPENTIN 300 MG/1
CAPSULE ORAL
Qty: 42 CAPSULE | Refills: 0 | Status: SHIPPED | OUTPATIENT
Start: 2022-11-16 | End: 2022-11-30

## 2022-11-16 NOTE — TELEPHONE ENCOUNTER
4/27/2022: Removal of left tibia max frame     3/16/2022: ORIF left pilon fracture and IMN of left fibula     3/2/2022: ex fix pin exchange      2/10/2022: closed reduction left pilon fracture and application of [place] ex fix      Refill request pended.

## 2022-11-18 DIAGNOSIS — S82.872G CLOSED DISPLACED PILON FRACTURE OF LEFT TIBIA WITH DELAYED HEALING, SUBSEQUENT ENCOUNTER: Primary | ICD-10-CM

## 2022-11-22 ENCOUNTER — HOSPITAL ENCOUNTER (OUTPATIENT)
Age: 77
Discharge: HOME | End: 2022-11-22
Payer: MEDICARE

## 2022-11-22 ENCOUNTER — OFFICE VISIT (OUTPATIENT)
Dept: ORTHOPEDIC SURGERY | Age: 77
End: 2022-11-22
Payer: MEDICARE

## 2022-11-22 VITALS — WEIGHT: 230 LBS | BODY MASS INDEX: 40.75 KG/M2 | HEIGHT: 63 IN

## 2022-11-22 DIAGNOSIS — S82.872D CLOSED DISPLACED PILON FRACTURE OF LEFT TIBIA WITH ROUTINE HEALING, SUBSEQUENT ENCOUNTER: Primary | ICD-10-CM

## 2022-11-22 DIAGNOSIS — E55.9 VITAMIN D DEFICIENCY: ICD-10-CM

## 2022-11-22 PROCEDURE — 99213 OFFICE O/P EST LOW 20 MIN: CPT | Performed by: NURSE PRACTITIONER

## 2022-11-22 PROCEDURE — 1123F ACP DISCUSS/DSCN MKR DOCD: CPT | Performed by: NURSE PRACTITIONER

## 2022-11-22 PROCEDURE — 82306 VITAMIN D 25 HYDROXY: CPT

## 2022-11-22 NOTE — PROGRESS NOTES
[facility]  [address] [place]  [address]  Dept Phone: [phone]  Dept Fax: [phone]      [facility] Follow Up      Subjective:   Date of Surgery:     4/27/2022: Removal of left tibia max frame     3/16/2022: ORIF left pilon fracture and IMN of left fibula     3/2/2022: ex fix pin exchange      2/10/2022: closed reduction left pilon fracture and application of [place] ex fix    Caitlyn Owen is a 69y.o. year old female who presents to the clinic today for routine follow up approximately 8 months status post definitive fixation of her left pilon fracture. Patient presents today with her daughter. Patient states she continues to have ankle pain remains unchanged if not worsened from the last time she was seen in the clinic 3 months ago. Patient states she stopped formal physical therapy in October because they were not doing enough with her ankle that she cannot do on her own at home. States she is only able to ambulate she is wearing the lace up ankle brace. Denies any feeling of the ankle being unstable, giving out or rolling but states the brace provides support to tolerate weightbearing. She typically ambulates with a walker but states just walking from the parking garage to the office, she has severe ankle pain and has to sit down. States she is not able to tolerate standing for periods of time such as not being able to stand long enough to cook a meal.  Denies any new injuries or falls. She continues to have decreased sensation along the entire plantar aspect of her foot that remains unchanged. She is currently taking Robaxin and gabapentin with moderate relief of pain but expresses frustration with dealing with daily pain.      Review of Systems  Gen: no fever, chills, malaise  CV: no chest pain or palpitations  Resp: no cough or shortness of breath  GI: no nausea, vomiting, diarrhea, or constipation  Neuro: no seizures, vertigo, or headache  Msk: left ankle pain   10 remaining systems reviewed and negative    Objective : There were no vitals filed for this visit. Body mass index is 40.74 kg/m². General: No acute distress, resting comfortably in the clinic  Neuro: alert. oriented  Eyes: Extra-ocular muscles intact  Pulm: Respirations unlabored and regular. Skin: warm, well perfused  Psych:   Patient has good fund of knowledge and displays understanding of exam, diagnosis, and plan. LLE: Skin intact. Previous surgical incisions well approximated and healed without evidence of infection. Minimal swelling about the ankle. TTP along the anterior ankle. Very minimal plantarflexion and dorsiflexion to neutral. Compartments soft. 2+ DP pulse. TA/EHL/FHL/GS motor intact. Deep and Superficial Peroneal/Saphenous/Sural SILT with dysesthesias about the entire plantar aspect of the foot from digits to heel. Radiology:  Imaging studies from today were independently reviewed and read as listed below. Any relevant images obtained prior to today's visit were also independently interpreted. History: ORIF left pilon fracture    Comparison: 8/23/2022    Findings: 2 views of the left tibia fibula (AP, lateral) in a skeletally mature patient showing previous internal fixation of a pilon fracture with multiple plates and screws. IMN fixation of the fibula unchanged in alignment. No acute hardware complications or loss of fixation. No new fractures or dislocations. Decreased tibiotalar joint space consistent with post traumatic arthritis. 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 History: ORIF left pilon fracture    Comparison: 8/23/2022    Findings: 3 views of the left ankle (AP, mortise, lateral) in a skeletally mature patient showing previous internal fixation of a pilon fracture with multiple plates and screws. IMN fixation of the fibula unchanged in alignment.  No acute hardware complications or loss of fixation. No new fractures or dislocations. Decreased tibiotalar joint space consistent with post traumatic arthritis. 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 Assessment:   69y.o. year old female with ORIF left pilon fracture; DOS: 3/16/2022. Plan:   - Reviewed x-rays with the patient which reveal no orthopedic hardware complications and again reveal evidence of post traumatic arthritis due to significant initial injury to the joint.     - Discussed option of continuing non-operative treatment with an AFO brace to help better support the ankle and limit range of motion of the ankle when ambulating. The other option would be operative treatment consisting of removal of hardware and insertion of a hindfoot nail. Benefits, risks and alternatives explained to the patient and her daughter. Patient wishes to proceed with ankle arthrodesis. - Discussed timing for surgery, patient wishes to wait until after the holidays. Patient verbalized understanding that if pain significantly increases/is no longer tolerable, she can call to schedule the surgery to be sooner than scheduled date. As of now, planning for ankle arthrodesis/hindfoot nail surgery on 1/4/23.     - Repeat vitamin D lab ordered. Will notify patient of result. Follow up:No follow-ups on file. No orders of the defined types were placed in this encounter. Orders Placed This Encounter   Procedures    Vitamin D 25 hydroxy       Electronically signed by SHONA Stratton CNP on 11/22/2022 at 5:34 PM    This note is created with the assistance of a speech recognition program.  While intending to generate a document that actually reflects the content of the visit, the document can still have some errors including those of syntax and sound a like substitutions which may escape proof reading.   In such instances, actual meaning can be extrapolated by contextual diversion

## 2022-11-23 DIAGNOSIS — E55.9 VITAMIN D DEFICIENCY: Primary | ICD-10-CM

## 2022-11-23 LAB — VITAMIN D 25-HYDROXY: 19.4 NG/ML

## 2022-11-23 RX ORDER — ERGOCALCIFEROL 1.25 MG/1
50000 CAPSULE ORAL WEEKLY
Qty: 8 CAPSULE | Refills: 0 | Status: SHIPPED | OUTPATIENT
Start: 2022-11-23 | End: 2023-01-12

## 2022-11-28 DIAGNOSIS — S82.872D CLOSED DISPLACED PILON FRACTURE OF LEFT TIBIA WITH ROUTINE HEALING, SUBSEQUENT ENCOUNTER: ICD-10-CM

## 2022-11-28 RX ORDER — METHOCARBAMOL 750 MG/1
TABLET, FILM COATED ORAL
Qty: 30 TABLET | Refills: 0 | Status: SHIPPED | OUTPATIENT
Start: 2022-11-28 | End: 2022-12-12

## 2022-11-28 NOTE — TELEPHONE ENCOUNTER
Date of Surgery:      4/27/2022: Removal of left tibia max frame     3/16/2022: ORIF left pilon fracture and IMN of left fibula     3/2/2022: ex fix pin exchange      2/10/2022: closed reduction left pilon fracture and application of [place] ex fix    Refill request pended

## 2022-12-16 DIAGNOSIS — S82.872D CLOSED DISPLACED PILON FRACTURE OF LEFT TIBIA WITH ROUTINE HEALING, SUBSEQUENT ENCOUNTER: ICD-10-CM

## 2022-12-16 RX ORDER — GABAPENTIN 100 MG/1
300 CAPSULE ORAL 3 TIMES DAILY
Qty: 189 CAPSULE | Refills: 0 | Status: CANCELLED | OUTPATIENT
Start: 2022-12-16 | End: 2023-01-06

## 2022-12-16 RX ORDER — GABAPENTIN 300 MG/1
300 CAPSULE ORAL 3 TIMES DAILY
Qty: 90 CAPSULE | Refills: 0 | Status: SHIPPED | OUTPATIENT
Start: 2022-12-16 | End: 2023-01-15

## 2022-12-16 NOTE — TELEPHONE ENCOUNTER
Gabapentin refill request, to [address] [facility] in [facility], pharmacy updated.

## 2022-12-20 ENCOUNTER — TELEPHONE (OUTPATIENT)
Dept: ORTHOPEDIC SURGERY | Age: 77
End: 2022-12-20

## 2023-01-28 ENCOUNTER — HOSPITAL ENCOUNTER (EMERGENCY)
Age: 78
Discharge: HOME | End: 2023-01-28
Attending: EMERGENCY MEDICINE
Payer: MEDICARE

## 2023-01-28 ENCOUNTER — APPOINTMENT (OUTPATIENT)
Dept: GENERAL RADIOLOGY | Age: 78
End: 2023-01-28
Payer: MEDICARE

## 2023-01-28 VITALS
SYSTOLIC BLOOD PRESSURE: 197 MMHG | HEART RATE: 69 BPM | TEMPERATURE: 97.2 F | OXYGEN SATURATION: 96 % | DIASTOLIC BLOOD PRESSURE: 87 MMHG | RESPIRATION RATE: 18 BRPM

## 2023-01-28 DIAGNOSIS — S93.602A FOOT SPRAIN, LEFT, INITIAL ENCOUNTER: Primary | ICD-10-CM

## 2023-01-28 PROCEDURE — 73610 X-RAY EXAM OF ANKLE: CPT

## 2023-01-28 PROCEDURE — 73630 X-RAY EXAM OF FOOT: CPT

## 2023-01-28 PROCEDURE — 73590 X-RAY EXAM OF LOWER LEG: CPT

## 2023-01-28 PROCEDURE — 6370000000 HC RX 637 (ALT 250 FOR IP): Performed by: STUDENT IN AN ORGANIZED HEALTH CARE EDUCATION/TRAINING PROGRAM

## 2023-01-28 PROCEDURE — 99283 EMERGENCY DEPT VISIT LOW MDM: CPT

## 2023-01-28 RX ORDER — IBUPROFEN 800 MG/1
800 TABLET ORAL ONCE
Status: COMPLETED | OUTPATIENT
Start: 2023-01-28 | End: 2023-01-28

## 2023-01-28 RX ADMIN — SACUBITRIL AND VALSARTAN 1 TABLET: 49; 51 TABLET, FILM COATED ORAL at 16:22

## 2023-01-28 RX ADMIN — IBUPROFEN 800 MG: 800 TABLET, FILM COATED ORAL at 15:53

## 2023-01-28 ASSESSMENT — PAIN - FUNCTIONAL ASSESSMENT: PAIN_FUNCTIONAL_ASSESSMENT: 0-10

## 2023-01-28 ASSESSMENT — ENCOUNTER SYMPTOMS
ABDOMINAL PAIN: 0
SHORTNESS OF BREATH: 0
FACIAL SWELLING: 0

## 2023-01-28 ASSESSMENT — PAIN DESCRIPTION - LOCATION
LOCATION: FOOT
LOCATION: FOOT

## 2023-01-28 ASSESSMENT — PAIN SCALES - GENERAL
PAINLEVEL_OUTOF10: 6
PAINLEVEL_OUTOF10: 6

## 2023-01-28 NOTE — DISCHARGE INSTRUCTIONS
Thank you for coming to [facility]. [facility] emergency department! You were seen today for foot pain, please follow up with Dr. Lilian Terry in one week. For pain take tylenol and motrin. Rest, ice, compress, and elevate the foot. If you have any worsening of symptoms or any other concerns, please return to the emergency department. We are always available!
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
## 2023-01-30 ENCOUNTER — TELEPHONE (OUTPATIENT)
Dept: ORTHOPEDIC SURGERY | Age: 78
End: 2023-01-30

## 2023-01-30 NOTE — TELEPHONE ENCOUNTER
Patient was seen at [facility] ED for a left foot sprain on 1/28. She states she was instructed to follow up with Dr Yazmin Mancilla, however, she is feeling fine and no need to be seen at this time. Thank you.

## 2023-02-15 DIAGNOSIS — S82.872D CLOSED DISPLACED PILON FRACTURE OF LEFT TIBIA WITH ROUTINE HEALING, SUBSEQUENT ENCOUNTER: ICD-10-CM

## 2023-02-15 RX ORDER — GABAPENTIN 300 MG/1
CAPSULE ORAL
Qty: 42 CAPSULE | Refills: 0 | Status: SHIPPED | OUTPATIENT
Start: 2023-02-15 | End: 2023-03-01

## 2023-04-18 ENCOUNTER — TELEPHONE (OUTPATIENT)
Dept: ORTHOPEDIC SURGERY | Age: 78
End: 2023-04-18

## 2023-04-18 NOTE — TELEPHONE ENCOUNTER
Patient called to report she was due to have surgery L ankle in January but had to cancel due to an emergency. She is asking can that be rescheduled?  Patient can be called back at  [phone]

## 2023-04-18 NOTE — TELEPHONE ENCOUNTER
As of now, planning for ankle arthrodesis/hindfoot nail surgery on 1/4/23. Do you want to see her, or just have her re-scheduled?

## 2023-05-16 ENCOUNTER — OFFICE VISIT (OUTPATIENT)
Dept: ORTHOPEDIC SURGERY | Age: 78
End: 2023-05-16

## 2023-05-16 ENCOUNTER — TELEPHONE (OUTPATIENT)
Dept: ORTHOPEDIC SURGERY | Age: 78
End: 2023-05-16

## 2023-05-16 VITALS — BODY MASS INDEX: 40.74 KG/M2 | WEIGHT: 230 LBS

## 2023-05-16 DIAGNOSIS — M79.672 LEFT FOOT PAIN: ICD-10-CM

## 2023-05-16 DIAGNOSIS — M25.572 LEFT ANKLE PAIN, UNSPECIFIED CHRONICITY: Primary | ICD-10-CM

## 2023-05-16 DIAGNOSIS — S82.872D CLOSED DISPLACED PILON FRACTURE OF LEFT TIBIA WITH ROUTINE HEALING, SUBSEQUENT ENCOUNTER: ICD-10-CM

## 2023-05-16 NOTE — TELEPHONE ENCOUNTER
Pt injured lt ankle in a fall last night 05/15/23, we scheduled a appt with Dr. Melinda Watson on 05/18/23 to evaluate her injury. Pt states is in pain and is unable to bear any weight on her foot. Has not been to ED    Is currently taking 2 650 MG Tylenol Arthritis tablets 3x a day and wanted to know if there was any things else she could take for the pain or if she could stop the Tylenol Arthritis and take something just for the pain.     Please send any medicine to     [facility], [address] [place] [address]     Please call pt with any questions @ [phone]

## 2023-07-27 ENCOUNTER — TELEPHONE (OUTPATIENT)
Dept: ORTHOPEDIC SURGERY | Age: 78
End: 2023-07-27

## 2023-07-27 NOTE — TELEPHONE ENCOUNTER
[employer] for [facility] states he sent a fax over yesterday requesting a physician signature from 1 year ago. Please return his call to [phone]. Thank you.

## 2024-03-15 ENCOUNTER — TELEPHONE (OUTPATIENT)
Dept: ORTHOPEDIC SURGERY | Age: 79
End: 2024-03-15

## 2024-03-15 NOTE — TELEPHONE ENCOUNTER
Received call from patient requesting to schedule ankle fusion. Pt has not been seen since May 2023. Pt would like to know if she needs to be seen prior to scheduling surgery. Pt stated she has several questions. Pt declined scheduling, would like a call back from Dr. Hawkins prior to scheduling office visit.    Please advise.    Call back#: [phone]

## 2024-04-09 ENCOUNTER — OFFICE VISIT (OUTPATIENT)
Dept: ORTHOPEDIC SURGERY | Age: 79
End: 2024-04-09

## 2024-04-09 VITALS — WEIGHT: 231 LBS | HEIGHT: 63 IN | BODY MASS INDEX: 40.93 KG/M2

## 2024-04-09 DIAGNOSIS — S82.872D CLOSED DISPLACED PILON FRACTURE OF LEFT TIBIA WITH ROUTINE HEALING, SUBSEQUENT ENCOUNTER: ICD-10-CM

## 2024-04-09 DIAGNOSIS — Z01.818 PRE-OP TESTING: ICD-10-CM

## 2024-04-09 DIAGNOSIS — M19.172 POST-TRAUMATIC ARTHRITIS OF LEFT ANKLE: Primary | ICD-10-CM

## 2024-04-09 DIAGNOSIS — M25.572 LEFT ANKLE PAIN, UNSPECIFIED CHRONICITY: ICD-10-CM

## 2024-04-09 PROCEDURE — 99213 OFFICE O/P EST LOW 20 MIN: CPT | Performed by: ORTHOPAEDIC SURGERY

## 2024-04-09 PROCEDURE — 1123F ACP DISCUSS/DSCN MKR DOCD: CPT | Performed by: ORTHOPAEDIC SURGERY

## 2024-04-09 NOTE — PROGRESS NOTES
[facility]  [address]  [facility] [address]  Dept Phone: [phone]  Dept Fax: [phone]      Orthopaedic Trauma Clinic Follow Up      Subjective:   Date of Surgery:   4/27/2022: Removal of left tibia max frame  3/16/2022: ORIF left pilon fracture and IMN of left fibula  3/2/2022: Ex-Fix pin exchange  2/10/2022: Closed reduction of left pilon fracture and application of circular external fixator    Margy Valdez is a 78 y.o. year old female who presents to the clinic today for routine follow up approximately 2 years from treatment of left pilon fracture.  Relevant interval history includes sustaining an MI that required triple bypass surgery in October 2023.  She has since been cleared by her cardiology team to undergo surgery on the left ankle.  She is currently on aspirin as her only blood thinner.  States that she still has daily pain when attempting to ambulate therefore is nearly entirely wheelchair dependent.  Discussed numerous times in the past the recommendation for left ankle arthrodesis and she is ready to go through with this at this time.    Review of Systems  Gen: no fever, chills, malaise  CV: no chest pain or palpitations  Resp: no cough or shortness of breath  GI: no nausea, vomiting, diarrhea, or constipation  Neuro: no seizures, vertigo, or headache  Msk: Left ankle pain with ambulation  10 remaining systems reviewed and negative    Objective :   There were no vitals filed for this visit.Body mass index is 40.92 kg/m².  General: No acute distress, resting comfortably in the clinic  Neuro: alert. oriented  Eyes: Extra-ocular muscles intact  Pulm: Respirations unlabored and regular.  Skin: warm, well perfused  Psych:   Patient has good fund of knowledge and displays understanding of exam, diagnosis, and plan.  MSK: LLE: Incisions well-healed.  Displaced dorsiflexion approximate 5 degrees and plantarflexion of 20 degrees.  No gross instability of the ankle.

## 2024-04-18 RX ORDER — SODIUM CHLORIDE, SODIUM LACTATE, POTASSIUM CHLORIDE, CALCIUM CHLORIDE 600; 310; 30; 20 MG/100ML; MG/100ML; MG/100ML; MG/100ML
INJECTION, SOLUTION INTRAVENOUS CONTINUOUS
OUTPATIENT
Start: 2024-04-18

## 2024-04-18 NOTE — DISCHARGE INSTRUCTIONS
Pre-operative Instructions    Please arrive at the surgery center by 6:20 AM on 5/8/2024  (or as directed by your surgeon's office). See Directons to Surgery Center below.                FASTING    NOTHING TO EAT OR DRINK AFTER MIDNIGHT the night prior to surgery (This includes gum, candy, mints, chewing tobacco, etc).                 MEDICATIONS    What to STOP: ANY BLOOD THINNING MEDICATION(S) as directed by your surgeon or prescribing physician.  FAILURE TO STOP CERTAIN MEDICATIONS MAY INTERFERE WITH YOUR SCHEDULED SURGERY.      According to the medication list you provided today, PLEASE STOP: naproxen (Naprosyn)    2. What to CONTINUE leading up to your surgery:   Please take all your other daily medications except the medications listed above that you were instructed to hold.    3. What to TAKE MORNING OF SURGERY with SMALL SIP OF WATER: synthroid, nexium, gabapentin (Neurontin)    Hold entresto morning of surgery.    PLEASE NOTE: ANY \"STOPPED\" MEDICATIONS MAY BE DUE TO CLEANING UP MEDICATION LIST DURING THIS VISIT.                        IF APPLICABLE:  -If you have been given a blood band, you must bring it with you the day of surgery, unclasped.  -Use routine inhalers and bring inhalers the day of surgery.   -Bring C-Pap/Bi-pap with you morning of surgery if planning on staying in the hospital overnight.  -Do not take diabetic medications on the day of surgery.  -Any weekly antidiabetic injections must be stopped one week prior to surgery and plan discussed with prescribing provider.                             OTHER IMPORTANT REMINDERS    1) You may be required to provide a urine sample upon your arrival to the pre-op area, so please take this into consideration.     2) If  NOT planning on staying in the hospital overnight :    A.You will need an adult family member /friend to drive you home after your procedure. Taxi cabs or any form of public transportation ALONE is not acceptable.   -Your  must

## 2024-04-25 ENCOUNTER — HOSPITAL ENCOUNTER (OUTPATIENT)
Dept: PREADMISSION TESTING | Age: 79
Discharge: HOME | End: 2024-04-25

## 2024-04-25 NOTE — PROGRESS NOTES
Missed PAT appointment. Writer phoned pt but no answer and voicemail is full. Writer then phoned emergency contact and informed her of missed appointment. Phone number to reschedule appointment given to Holly. Message left on Darcy's phone at Dr Hawkins office of missed appointment. Information also faxed to office.

## 2024-04-26 ENCOUNTER — HOSPITAL ENCOUNTER (OUTPATIENT)
Dept: GENERAL RADIOLOGY | Age: 79
End: 2024-04-26
Payer: MEDICARE

## 2024-04-26 ENCOUNTER — HOSPITAL ENCOUNTER (OUTPATIENT)
Dept: PREADMISSION TESTING | Age: 79
End: 2024-04-26
Payer: MEDICARE

## 2024-04-26 VITALS
RESPIRATION RATE: 18 BRPM | WEIGHT: 228 LBS | TEMPERATURE: 97.7 F | DIASTOLIC BLOOD PRESSURE: 80 MMHG | HEIGHT: 63 IN | OXYGEN SATURATION: 96 % | HEART RATE: 60 BPM | SYSTOLIC BLOOD PRESSURE: 154 MMHG | BODY MASS INDEX: 40.4 KG/M2

## 2024-04-26 DIAGNOSIS — Z01.818 PRE-OP TESTING: ICD-10-CM

## 2024-04-26 LAB
ALBUMIN SERPL-MCNC: 4.1 G/DL (ref 3.5–5.2)
ALBUMIN/GLOB SERPL: 1 {RATIO} (ref 1–2.5)
ALP SERPL-CCNC: 79 U/L (ref 35–104)
ALT SERPL-CCNC: 14 U/L (ref 10–35)
ANION GAP SERPL CALCULATED.3IONS-SCNC: 15 MMOL/L (ref 9–16)
AST SERPL-CCNC: 26 U/L (ref 10–35)
BACTERIA URNS QL MICRO: NORMAL
BILIRUB SERPL-MCNC: 0.4 MG/DL (ref 0–1.2)
BILIRUB UR QL STRIP: NEGATIVE
BUN SERPL-MCNC: 11 MG/DL (ref 8–23)
CALCIUM SERPL-MCNC: 9.4 MG/DL (ref 8.6–10.4)
CASTS #/AREA URNS LPF: NORMAL /LPF (ref 0–8)
CHLORIDE SERPL-SCNC: 101 MMOL/L (ref 98–107)
CLARITY UR: CLEAR
CO2 SERPL-SCNC: 20 MMOL/L (ref 20–31)
COLOR UR: YELLOW
CREAT SERPL-MCNC: 1.1 MG/DL (ref 0.5–0.9)
EKG ATRIAL RATE: 65 BPM
EKG P AXIS: 73 DEGREES
EKG P-R INTERVAL: 164 MS
EKG Q-T INTERVAL: 484 MS
EKG QRS DURATION: 88 MS
EKG QTC CALCULATION (BAZETT): 503 MS
EKG R AXIS: 20 DEGREES
EKG T AXIS: 51 DEGREES
EKG VENTRICULAR RATE: 65 BPM
EPI CELLS #/AREA URNS HPF: NORMAL /HPF (ref 0–5)
ERYTHROCYTE [DISTWIDTH] IN BLOOD BY AUTOMATED COUNT: 14.9 % (ref 11.8–14.4)
GFR SERPL CREATININE-BSD FRML MDRD: 54 ML/MIN/1.73M2
GLUCOSE SERPL-MCNC: 171 MG/DL (ref 74–99)
GLUCOSE UR STRIP-MCNC: ABNORMAL MG/DL
HCT VFR BLD AUTO: 44.6 % (ref 36.3–47.1)
HGB BLD-MCNC: 13.6 G/DL (ref 11.9–15.1)
HGB UR QL STRIP.AUTO: NEGATIVE
KETONES UR STRIP-MCNC: NEGATIVE MG/DL
LEUKOCYTE ESTERASE UR QL STRIP: ABNORMAL
MCH RBC QN AUTO: 26.6 PG (ref 25.2–33.5)
MCHC RBC AUTO-ENTMCNC: 30.5 G/DL (ref 28.4–34.8)
MCV RBC AUTO: 87.3 FL (ref 82.6–102.9)
NITRITE UR QL STRIP: NEGATIVE
NRBC BLD-RTO: 0 PER 100 WBC
PH UR STRIP: 5.5 [PH] (ref 5–8)
PLATELET # BLD AUTO: 302 K/UL (ref 138–453)
PMV BLD AUTO: 9.2 FL (ref 8.1–13.5)
POTASSIUM SERPL-SCNC: 4.1 MMOL/L (ref 3.7–5.3)
PROT SERPL-MCNC: 7.8 G/DL (ref 6.6–8.7)
PROT UR STRIP-MCNC: NEGATIVE MG/DL
RBC # BLD AUTO: 5.11 M/UL (ref 3.95–5.11)
RBC #/AREA URNS HPF: NORMAL /HPF (ref 0–4)
SODIUM SERPL-SCNC: 136 MMOL/L (ref 136–145)
SP GR UR STRIP: 1.02 (ref 1–1.03)
UROBILINOGEN UR STRIP-ACNC: NORMAL EU/DL (ref 0–1)
WBC #/AREA URNS HPF: NORMAL /HPF (ref 0–5)
WBC OTHER # BLD: 10.5 K/UL (ref 3.5–11.3)

## 2024-04-26 PROCEDURE — 36415 COLL VENOUS BLD VENIPUNCTURE: CPT

## 2024-04-26 PROCEDURE — 71046 X-RAY EXAM CHEST 2 VIEWS: CPT

## 2024-04-26 PROCEDURE — 81001 URINALYSIS AUTO W/SCOPE: CPT

## 2024-04-26 PROCEDURE — 93005 ELECTROCARDIOGRAM TRACING: CPT

## 2024-04-26 PROCEDURE — 80053 COMPREHEN METABOLIC PANEL: CPT

## 2024-04-26 PROCEDURE — 85027 COMPLETE CBC AUTOMATED: CPT

## 2024-04-26 RX ORDER — ASPIRIN 81 MG/1
81 TABLET, CHEWABLE ORAL DAILY
COMMUNITY

## 2024-04-26 RX ORDER — INSULIN GLARGINE 100 [IU]/ML
INJECTION, SOLUTION SUBCUTANEOUS 2 TIMES DAILY
COMMUNITY
Start: 2024-01-31

## 2024-04-26 RX ORDER — FUROSEMIDE 20 MG/1
20 TABLET ORAL PRN
COMMUNITY

## 2024-04-26 ASSESSMENT — PAIN DESCRIPTION - DESCRIPTORS: DESCRIPTORS: ACHING

## 2024-04-26 ASSESSMENT — PAIN DESCRIPTION - ONSET: ONSET: ON-GOING

## 2024-04-26 ASSESSMENT — PAIN SCALES - GENERAL: PAINLEVEL_OUTOF10: 6

## 2024-04-26 ASSESSMENT — PAIN DESCRIPTION - LOCATION: LOCATION: ANKLE

## 2024-04-26 ASSESSMENT — PAIN DESCRIPTION - FREQUENCY: FREQUENCY: CONTINUOUS

## 2024-04-26 ASSESSMENT — PAIN DESCRIPTION - ORIENTATION: ORIENTATION: LEFT

## 2024-04-26 ASSESSMENT — PAIN DESCRIPTION - PAIN TYPE: TYPE: CHRONIC PAIN;ACUTE PAIN

## 2024-04-26 NOTE — DISCHARGE INSTRUCTIONS
Preoperative Instructions:    Stop eating solid foods at midnight the night prior to surgery.    Stop drinking clear liquids at midnight the night prior to surgery.    Arrive at the surgery center (Entrance B) by 6:20 on 5/8/2024  (or as directed by your surgeon's office).    Please stop any blood thinning medications as directed by your surgeon or prescribing physician. Failure to stop certain medications may interfere with your scheduled surgery.    These may include:  Aspirin, Warfarin (Coumadin), Clopidogrel (Plavix), Ibuprofen (Motrin, Advil), Naproxen (Aleve), Meloxicam (Mobic), Celecoxib (Celebrex), Eliquis, Pradaxa, Xarelto, Effient, Fish Oil, Herbal supplements.    You may continue the rest of your medications through the night before surgery unless instructed otherwise.    Please take only the following medication(s) the day of surgery with a small sip of water:  Gabapentin/neurontin, synthroid/levothyroxine, nexium/omeprazole    No entresto AM of surgery.    Please use and bring inhalers the day of surgery.  Please bring CPAP the day of surgery.    PLEASE NOTE:  THE ABOVE (IF ANY) DISCONTINUED MEDS MAY ONLY BE FROM   \"CLEANING UP\" THE MED LIST AND WERE NOT ACTUALLY CANCELLED; SEE CHART FOR DETAILS AND ALWAYS CHECK WITH PRESCRIBING PROVIDER BEFORE DISCONTINUING ANY MEDICATIONS      REMINDERS:  ** If you are going home the day of your procedure, you will need a friend or family member to drive you home after your procedure.  Your  must be 18 years of age or older and able to sign off on your discharge instructions.  Taxi cabs or any form of public transportation is not acceptable.    ** It is preferable that the friend or family member stay at the hospital throughout your procedure.  ** If you are going home the same day as your procedure, someone must remain with you for the first 24 hours after your surgery if you receive anesthesia or sedation.  If you do not have someone to stay with you, your  powder.    Dress with clean freshly washed clothes.    The morning of surgery:    Repeat shower following steps above  - using remaining half of CHG soap in bottle.     If you have any questions, call the Pre-Admission Testing Unit at [phone].     Day of Surgery/Procedure    As a patient at [facility] you can expect quality medical and nursing care that is centered on your individual needs.  Our goal is to make your surgical experience as comfortable as possible  .  Directions to the Surgery Center    [facility] is located at [address] [place] [address].  Please pull into the Emergency Room & Surgery Center parking lot (Entrance B) and park in that lot.  We also have additional parking across the street.  You will enter the facility following the [facility] sign.  Please stop at the reception desk where you will be checked in by the staff.  If you have any questions please call [phone].    Transportation after your procedure.    You will need a friend or family member to drive you home after your procedure.  Your  must be 18 years of age or older and able to sign off on your discharge instructions.  Taxi cabs or any form of public transportation is not acceptable.  It is preferable that the friend or family member stay at the hospital throughout your procedure.  Someone must remain at home with you for the first 24 hours after your surgery if you receive anesthesia or sedation.  If you do not have someone to stay with you, your procedure may be cancelled.    Patient Instructions    If you are having any type of anesthesia you are to have nothing to eat or drink after midnight the night before your surgery.  This includes gum, mints, water or smoking or chewing tobacco.  The only exception to this is a small sip of water to take with any morning dose of heart, blood pressure, or seizure medications.    Bring a list of all medications you
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
## 2024-04-26 NOTE — PROGRESS NOTES
Anesthesia Focused Assessment      STOP-BANG Sleep Apnea Questionnaire    SNORE loudly (heard through closed doors)?   Yes  TIRED, fatigued, sleepy during daytime?    Yes  OBSERVED stopping breathing during sleep?   Yes  High blood PRESSURE being treated?    Yes    BMI over 35?        Yes  AGE over 50?        Yes  NECK circumference over 16\"?     No  GENDER (male)?       No             Total 6  High risk 5-8  Intermediate risk 3-4  Low risk 0-2    Obstructive Sleep Apnea: yes  If YES, machine used: does not use cpap     Type 1 DM:   no  T2DM:  yes    Coronary Artery Disease:  yes, s/p CABG 8/2023, follows with [facility].  Hypertension:  yes    Active smoker:  no  Drinks alcohol:  no  Recreational drugs: no    Dentition: benign    Defib / AICD / Pacemaker: no      Renal Failure/dialysis:  no    Patient was evaluated in PAT & anesthesia guidelines were applied.   NPO guidelines, medication instructions and scheduled arrival time were reviewed with patient.  I advised patient to please contact the surgeon's office, ahead of time if possible, if any new signs or symptoms of illness, infection, rash, etc    Hx of anesthesia complications:  no  Family hx of anesthesia complications:  no                                                                                                                     Office notes from [facility] are on file paper chart and CE, clearance is outdated, will request updated clearance.  Patient is without cardiac or pulmonary complaints, says that any SOB that she had prior to her CABG last year has resolved, feels overall good.    EUGENIO ROSARIO PA-C  4/26/24  10:10 AM

## 2024-04-29 NOTE — PROGRESS NOTES
Cardiac clearance received for procedure scheduled 5/8/2024. ASA instructions also received. Writer then called pt and informed her of ASA instructions. May hold ASA x7 days if needed.

## 2024-05-08 ENCOUNTER — HOSPITAL ENCOUNTER (INPATIENT)
Age: 79
LOS: 2 days | Discharge: HOME | End: 2024-05-10
Attending: ORTHOPAEDIC SURGERY | Admitting: ORTHOPAEDIC SURGERY
Payer: MEDICARE

## 2024-05-08 ENCOUNTER — APPOINTMENT (OUTPATIENT)
Dept: GENERAL RADIOLOGY | Age: 79
End: 2024-05-08
Attending: ORTHOPAEDIC SURGERY
Payer: MEDICARE

## 2024-05-08 ENCOUNTER — ANESTHESIA EVENT (OUTPATIENT)
Dept: OPERATING ROOM | Age: 79
End: 2024-05-08
Payer: MEDICARE

## 2024-05-08 ENCOUNTER — ANESTHESIA (OUTPATIENT)
Dept: OPERATING ROOM | Age: 79
End: 2024-05-08
Payer: MEDICARE

## 2024-05-08 DIAGNOSIS — M19.172 POST-TRAUMATIC ARTHRITIS OF LEFT ANKLE: Primary | ICD-10-CM

## 2024-05-08 PROBLEM — M19.072: Status: ACTIVE | Noted: 2024-05-08

## 2024-05-08 LAB
GLUCOSE BLD-MCNC: 196 MG/DL (ref 65–105)
GLUCOSE BLD-MCNC: 231 MG/DL (ref 65–105)
GLUCOSE BLD-MCNC: 260 MG/DL (ref 65–105)

## 2024-05-08 PROCEDURE — 0QPH04Z REMOVAL OF INTERNAL FIXATION DEVICE FROM LEFT TIBIA, OPEN APPROACH: ICD-10-PCS | Performed by: ORTHOPAEDIC SURGERY

## 2024-05-08 PROCEDURE — 82947 ASSAY GLUCOSE BLOOD QUANT: CPT

## 2024-05-08 PROCEDURE — 3600000004 HC SURGERY LEVEL 4 BASE: Performed by: ORTHOPAEDIC SURGERY

## 2024-05-08 PROCEDURE — 2500000003 HC RX 250 WO HCPCS

## 2024-05-08 PROCEDURE — 6360000002 HC RX W HCPCS

## 2024-05-08 PROCEDURE — 2720000010 HC SURG SUPPLY STERILE: Performed by: ORTHOPAEDIC SURGERY

## 2024-05-08 PROCEDURE — 2580000003 HC RX 258

## 2024-05-08 PROCEDURE — 73590 X-RAY EXAM OF LOWER LEG: CPT

## 2024-05-08 PROCEDURE — 2580000003 HC RX 258: Performed by: STUDENT IN AN ORGANIZED HEALTH CARE EDUCATION/TRAINING PROGRAM

## 2024-05-08 PROCEDURE — 99222 1ST HOSP IP/OBS MODERATE 55: CPT | Performed by: NURSE PRACTITIONER

## 2024-05-08 PROCEDURE — 27870 FUSION OF ANKLE JOINT OPEN: CPT | Performed by: ORTHOPAEDIC SURGERY

## 2024-05-08 PROCEDURE — 64447 NJX AA&/STRD FEMORAL NRV IMG: CPT | Performed by: STUDENT IN AN ORGANIZED HEALTH CARE EDUCATION/TRAINING PROGRAM

## 2024-05-08 PROCEDURE — 7100000001 HC PACU RECOVERY - ADDTL 15 MIN: Performed by: ORTHOPAEDIC SURGERY

## 2024-05-08 PROCEDURE — C1769 GUIDE WIRE: HCPCS | Performed by: ORTHOPAEDIC SURGERY

## 2024-05-08 PROCEDURE — 0SGG0KZ FUSION OF LEFT ANKLE JOINT WITH NONAUTOLOGOUS TISSUE SUBSTITUTE, OPEN APPROACH: ICD-10-PCS | Performed by: ORTHOPAEDIC SURGERY

## 2024-05-08 PROCEDURE — 6370000000 HC RX 637 (ALT 250 FOR IP): Performed by: NURSE PRACTITIONER

## 2024-05-08 PROCEDURE — 0SGJ0KZ FUSION OF LEFT TARSAL JOINT WITH NONAUTOLOGOUS TISSUE SUBSTITUTE, OPEN APPROACH: ICD-10-PCS | Performed by: ORTHOPAEDIC SURGERY

## 2024-05-08 PROCEDURE — 2709999900 HC NON-CHARGEABLE SUPPLY: Performed by: ORTHOPAEDIC SURGERY

## 2024-05-08 PROCEDURE — 3700000000 HC ANESTHESIA ATTENDED CARE: Performed by: ORTHOPAEDIC SURGERY

## 2024-05-08 PROCEDURE — 3600000014 HC SURGERY LEVEL 4 ADDTL 15MIN: Performed by: ORTHOPAEDIC SURGERY

## 2024-05-08 PROCEDURE — C1713 ANCHOR/SCREW BN/BN,TIS/BN: HCPCS | Performed by: ORTHOPAEDIC SURGERY

## 2024-05-08 PROCEDURE — 6370000000 HC RX 637 (ALT 250 FOR IP): Performed by: STUDENT IN AN ORGANIZED HEALTH CARE EDUCATION/TRAINING PROGRAM

## 2024-05-08 PROCEDURE — 2580000003 HC RX 258: Performed by: ORTHOPAEDIC SURGERY

## 2024-05-08 PROCEDURE — 7100000000 HC PACU RECOVERY - FIRST 15 MIN: Performed by: ORTHOPAEDIC SURGERY

## 2024-05-08 PROCEDURE — 6360000002 HC RX W HCPCS: Performed by: STUDENT IN AN ORGANIZED HEALTH CARE EDUCATION/TRAINING PROGRAM

## 2024-05-08 PROCEDURE — 28725 ARTHRODESIS SUBTALAR: CPT | Performed by: ORTHOPAEDIC SURGERY

## 2024-05-08 PROCEDURE — 73610 X-RAY EXAM OF ANKLE: CPT

## 2024-05-08 PROCEDURE — 2580000003 HC RX 258: Performed by: ANESTHESIOLOGY

## 2024-05-08 PROCEDURE — 1200000000 HC SEMI PRIVATE

## 2024-05-08 PROCEDURE — 3700000001 HC ADD 15 MINUTES (ANESTHESIA): Performed by: ORTHOPAEDIC SURGERY

## 2024-05-08 PROCEDURE — C1734 ORTH/DEVIC/DRUG BN/BN,TIS/BN: HCPCS | Performed by: ORTHOPAEDIC SURGERY

## 2024-05-08 PROCEDURE — 64445 NJX AA&/STRD SCIATIC NRV IMG: CPT | Performed by: STUDENT IN AN ORGANIZED HEALTH CARE EDUCATION/TRAINING PROGRAM

## 2024-05-08 DEVICE — LOCKING SCREW
Type: IMPLANTABLE DEVICE | Site: ANKLE | Status: FUNCTIONAL
Brand: T2 ALPHA

## 2024-05-08 DEVICE — GRAFT BNE INJ 3 CC AUG: Type: IMPLANTABLE DEVICE | Site: ANKLE | Status: FUNCTIONAL

## 2024-05-08 DEVICE — COMPRESSION SCREW CANNULATED
Type: IMPLANTABLE DEVICE | Site: ANKLE | Status: FUNCTIONAL
Brand: T2

## 2024-05-08 DEVICE — ANKLE ARTHRODESIS NAIL, LEFT
Type: IMPLANTABLE DEVICE | Site: ANKLE | Status: FUNCTIONAL
Brand: T2

## 2024-05-08 DEVICE — GRAFT BNE SUB 15CC 1 8MM CANC CRUSH CHIP READIGRFT: Type: IMPLANTABLE DEVICE | Site: ANKLE | Status: FUNCTIONAL

## 2024-05-08 RX ORDER — ACETAMINOPHEN 500 MG
1000 TABLET ORAL EVERY 6 HOURS PRN
Qty: 112 TABLET | Refills: 0 | Status: SHIPPED | OUTPATIENT
Start: 2024-05-08

## 2024-05-08 RX ORDER — SODIUM CHLORIDE 0.9 % (FLUSH) 0.9 %
5-40 SYRINGE (ML) INJECTION EVERY 12 HOURS SCHEDULED
Status: DISCONTINUED | OUTPATIENT
Start: 2024-05-08 | End: 2024-05-10 | Stop reason: HOSPADM

## 2024-05-08 RX ORDER — DICYCLOMINE HYDROCHLORIDE 10 MG/1
10 CAPSULE ORAL 2 TIMES DAILY PRN
Status: DISCONTINUED | OUTPATIENT
Start: 2024-05-08 | End: 2024-05-10 | Stop reason: HOSPADM

## 2024-05-08 RX ORDER — DEXAMETHASONE SODIUM PHOSPHATE 4 MG/ML
INJECTION, SOLUTION INTRA-ARTICULAR; INTRALESIONAL; INTRAMUSCULAR; INTRAVENOUS; SOFT TISSUE PRN
Status: DISCONTINUED | OUTPATIENT
Start: 2024-05-08 | End: 2024-05-08

## 2024-05-08 RX ORDER — LIDOCAINE HYDROCHLORIDE 10 MG/ML
INJECTION, SOLUTION EPIDURAL; INFILTRATION; INTRACAUDAL; PERINEURAL PRN
Status: DISCONTINUED | OUTPATIENT
Start: 2024-05-08 | End: 2024-05-08 | Stop reason: SDUPTHER

## 2024-05-08 RX ORDER — CITALOPRAM 20 MG/1
40 TABLET ORAL DAILY
Status: DISCONTINUED | OUTPATIENT
Start: 2024-05-08 | End: 2024-05-10 | Stop reason: HOSPADM

## 2024-05-08 RX ORDER — SODIUM CHLORIDE, SODIUM LACTATE, POTASSIUM CHLORIDE, CALCIUM CHLORIDE 600; 310; 30; 20 MG/100ML; MG/100ML; MG/100ML; MG/100ML
INJECTION, SOLUTION INTRAVENOUS CONTINUOUS
Status: DISCONTINUED | OUTPATIENT
Start: 2024-05-08 | End: 2024-05-08 | Stop reason: HOSPADM

## 2024-05-08 RX ORDER — ASPIRIN 81 MG/1
81 TABLET ORAL 2 TIMES DAILY
Status: DISCONTINUED | OUTPATIENT
Start: 2024-05-09 | End: 2024-05-10 | Stop reason: HOSPADM

## 2024-05-08 RX ORDER — ONDANSETRON 2 MG/ML
4 INJECTION INTRAMUSCULAR; INTRAVENOUS EVERY 6 HOURS PRN
Status: DISCONTINUED | OUTPATIENT
Start: 2024-05-08 | End: 2024-05-10 | Stop reason: HOSPADM

## 2024-05-08 RX ORDER — NAPROXEN 500 MG/1
500 TABLET ORAL EVERY 12 HOURS PRN
Qty: 28 TABLET | Refills: 0 | Status: SHIPPED | OUTPATIENT
Start: 2024-05-08 | End: 2024-05-08 | Stop reason: HOSPADM

## 2024-05-08 RX ORDER — GABAPENTIN 100 MG/1
100 CAPSULE ORAL 3 TIMES DAILY
Qty: 42 CAPSULE | Refills: 0 | Status: SHIPPED | OUTPATIENT
Start: 2024-05-08 | End: 2024-05-22

## 2024-05-08 RX ORDER — GABAPENTIN 300 MG/1
300 CAPSULE ORAL 3 TIMES DAILY
Status: DISCONTINUED | OUTPATIENT
Start: 2024-05-08 | End: 2024-05-10 | Stop reason: HOSPADM

## 2024-05-08 RX ORDER — BUPIVACAINE HYDROCHLORIDE 5 MG/ML
30 INJECTION, SOLUTION EPIDURAL; INTRACAUDAL ONCE
Status: COMPLETED | OUTPATIENT
Start: 2024-05-08 | End: 2024-05-08

## 2024-05-08 RX ORDER — ACETAMINOPHEN 500 MG
500 TABLET ORAL ONCE
Status: DISCONTINUED | OUTPATIENT
Start: 2024-05-08 | End: 2024-05-08 | Stop reason: HOSPADM

## 2024-05-08 RX ORDER — SODIUM CHLORIDE 0.9 % (FLUSH) 0.9 %
5-40 SYRINGE (ML) INJECTION PRN
Status: DISCONTINUED | OUTPATIENT
Start: 2024-05-08 | End: 2024-05-10 | Stop reason: HOSPADM

## 2024-05-08 RX ORDER — FENTANYL CITRATE 50 UG/ML
100 INJECTION, SOLUTION INTRAMUSCULAR; INTRAVENOUS ONCE
Status: COMPLETED | OUTPATIENT
Start: 2024-05-08 | End: 2024-05-08

## 2024-05-08 RX ORDER — SODIUM CHLORIDE 9 MG/ML
INJECTION, SOLUTION INTRAVENOUS CONTINUOUS
Status: DISCONTINUED | OUTPATIENT
Start: 2024-05-08 | End: 2024-05-09

## 2024-05-08 RX ORDER — POLYETHYLENE GLYCOL 3350 17 G/17G
17 POWDER, FOR SOLUTION ORAL DAILY PRN
Status: DISCONTINUED | OUTPATIENT
Start: 2024-05-08 | End: 2024-05-10 | Stop reason: HOSPADM

## 2024-05-08 RX ORDER — ROCURONIUM BROMIDE 10 MG/ML
INJECTION, SOLUTION INTRAVENOUS PRN
Status: DISCONTINUED | OUTPATIENT
Start: 2024-05-08 | End: 2024-05-08 | Stop reason: SDUPTHER

## 2024-05-08 RX ORDER — ERGOCALCIFEROL 1.25 MG/1
50000 CAPSULE ORAL WEEKLY
Status: DISCONTINUED | OUTPATIENT
Start: 2024-05-08 | End: 2024-05-10 | Stop reason: HOSPADM

## 2024-05-08 RX ORDER — SODIUM CHLORIDE 9 MG/ML
INJECTION, SOLUTION INTRAVENOUS PRN
Status: DISCONTINUED | OUTPATIENT
Start: 2024-05-08 | End: 2024-05-10 | Stop reason: HOSPADM

## 2024-05-08 RX ORDER — BUPIVACAINE HYDROCHLORIDE 5 MG/ML
INJECTION, SOLUTION EPIDURAL; INTRACAUDAL
Status: COMPLETED | OUTPATIENT
Start: 2024-05-08 | End: 2024-05-08

## 2024-05-08 RX ORDER — MIDAZOLAM HYDROCHLORIDE 2 MG/2ML
2 INJECTION, SOLUTION INTRAMUSCULAR; INTRAVENOUS ONCE
Status: COMPLETED | OUTPATIENT
Start: 2024-05-08 | End: 2024-05-08

## 2024-05-08 RX ORDER — OXYCODONE HYDROCHLORIDE 5 MG/1
5 TABLET ORAL EVERY 4 HOURS PRN
Status: DISCONTINUED | OUTPATIENT
Start: 2024-05-08 | End: 2024-05-10 | Stop reason: HOSPADM

## 2024-05-08 RX ORDER — OXYCODONE HYDROCHLORIDE 5 MG/1
5-10 TABLET ORAL EVERY 6 HOURS PRN
Qty: 30 TABLET | Refills: 0 | Status: SHIPPED | OUTPATIENT
Start: 2024-05-08 | End: 2024-05-13

## 2024-05-08 RX ORDER — ASPIRIN 81 MG/1
81 TABLET ORAL 2 TIMES DAILY
Qty: 28 TABLET | Refills: 0 | Status: SHIPPED | OUTPATIENT
Start: 2024-05-09 | End: 2024-05-23

## 2024-05-08 RX ORDER — INSULIN LISPRO 100 [IU]/ML
58 INJECTION, SOLUTION INTRAVENOUS; SUBCUTANEOUS SEE ADMIN INSTRUCTIONS
Status: DISCONTINUED | OUTPATIENT
Start: 2024-05-08 | End: 2024-05-10 | Stop reason: HOSPADM

## 2024-05-08 RX ORDER — BUSPIRONE HYDROCHLORIDE 5 MG/1
7.5 TABLET ORAL 2 TIMES DAILY
Status: DISCONTINUED | OUTPATIENT
Start: 2024-05-08 | End: 2024-05-10 | Stop reason: HOSPADM

## 2024-05-08 RX ORDER — DOCUSATE SODIUM 100 MG/1
100 CAPSULE, LIQUID FILLED ORAL 2 TIMES DAILY
Qty: 20 CAPSULE | Refills: 0 | Status: SHIPPED | OUTPATIENT
Start: 2024-05-08

## 2024-05-08 RX ORDER — INSULIN GLARGINE 100 [IU]/ML
80 INJECTION, SOLUTION SUBCUTANEOUS EVERY MORNING
Status: DISCONTINUED | OUTPATIENT
Start: 2024-05-09 | End: 2024-05-10 | Stop reason: HOSPADM

## 2024-05-08 RX ORDER — ATORVASTATIN CALCIUM 80 MG/1
80 TABLET, FILM COATED ORAL DAILY
Status: DISCONTINUED | OUTPATIENT
Start: 2024-05-08 | End: 2024-05-10 | Stop reason: HOSPADM

## 2024-05-08 RX ORDER — SODIUM CHLORIDE 0.9 % (FLUSH) 0.9 %
5-40 SYRINGE (ML) INJECTION EVERY 12 HOURS SCHEDULED
Status: DISCONTINUED | OUTPATIENT
Start: 2024-05-08 | End: 2024-05-08 | Stop reason: HOSPADM

## 2024-05-08 RX ORDER — HYDRALAZINE HYDROCHLORIDE 20 MG/ML
10 INJECTION INTRAMUSCULAR; INTRAVENOUS
Status: DISCONTINUED | OUTPATIENT
Start: 2024-05-08 | End: 2024-05-08 | Stop reason: HOSPADM

## 2024-05-08 RX ORDER — ACETAMINOPHEN 325 MG/1
650 TABLET ORAL EVERY 6 HOURS
Status: DISCONTINUED | OUTPATIENT
Start: 2024-05-08 | End: 2024-05-10 | Stop reason: HOSPADM

## 2024-05-08 RX ORDER — LEVOTHYROXINE SODIUM 0.12 MG/1
125 TABLET ORAL DAILY
Status: DISCONTINUED | OUTPATIENT
Start: 2024-05-09 | End: 2024-05-10 | Stop reason: HOSPADM

## 2024-05-08 RX ORDER — ONDANSETRON 2 MG/ML
4 INJECTION INTRAMUSCULAR; INTRAVENOUS
Status: COMPLETED | OUTPATIENT
Start: 2024-05-08 | End: 2024-05-08

## 2024-05-08 RX ORDER — INSULIN GLARGINE 100 [IU]/ML
60 INJECTION, SOLUTION SUBCUTANEOUS NIGHTLY
Status: DISCONTINUED | OUTPATIENT
Start: 2024-05-08 | End: 2024-05-10 | Stop reason: HOSPADM

## 2024-05-08 RX ORDER — BUPIVACAINE HYDROCHLORIDE 2.5 MG/ML
5 INJECTION, SOLUTION INFILTRATION; PERINEURAL ONCE
Status: DISCONTINUED | OUTPATIENT
Start: 2024-05-08 | End: 2024-05-08

## 2024-05-08 RX ORDER — CYCLOBENZAPRINE HCL 10 MG
10 TABLET ORAL EVERY 6 HOURS PRN
Qty: 50 TABLET | Refills: 0 | Status: SHIPPED | OUTPATIENT
Start: 2024-05-08 | End: 2024-05-21

## 2024-05-08 RX ORDER — NALOXONE HYDROCHLORIDE 0.4 MG/ML
INJECTION, SOLUTION INTRAMUSCULAR; INTRAVENOUS; SUBCUTANEOUS PRN
Status: DISCONTINUED | OUTPATIENT
Start: 2024-05-08 | End: 2024-05-08 | Stop reason: HOSPADM

## 2024-05-08 RX ORDER — ONDANSETRON 4 MG/1
4 TABLET, ORALLY DISINTEGRATING ORAL EVERY 8 HOURS PRN
Status: DISCONTINUED | OUTPATIENT
Start: 2024-05-08 | End: 2024-05-10 | Stop reason: HOSPADM

## 2024-05-08 RX ORDER — DEXAMETHASONE SODIUM PHOSPHATE 10 MG/ML
INJECTION INTRAMUSCULAR; INTRAVENOUS PRN
Status: DISCONTINUED | OUTPATIENT
Start: 2024-05-08 | End: 2024-05-08 | Stop reason: SDUPTHER

## 2024-05-08 RX ORDER — CEFAZOLIN SODIUM 1 G/3ML
INJECTION, POWDER, FOR SOLUTION INTRAMUSCULAR; INTRAVENOUS PRN
Status: DISCONTINUED | OUTPATIENT
Start: 2024-05-08 | End: 2024-05-08 | Stop reason: SDUPTHER

## 2024-05-08 RX ORDER — PROPOFOL 10 MG/ML
INJECTION, EMULSION INTRAVENOUS PRN
Status: DISCONTINUED | OUTPATIENT
Start: 2024-05-08 | End: 2024-05-08 | Stop reason: SDUPTHER

## 2024-05-08 RX ORDER — DOCUSATE SODIUM 100 MG/1
100 CAPSULE, LIQUID FILLED ORAL 2 TIMES DAILY
Status: DISCONTINUED | OUTPATIENT
Start: 2024-05-08 | End: 2024-05-10 | Stop reason: HOSPADM

## 2024-05-08 RX ORDER — OXYCODONE HYDROCHLORIDE 5 MG/1
10 TABLET ORAL EVERY 4 HOURS PRN
Status: DISCONTINUED | OUTPATIENT
Start: 2024-05-08 | End: 2024-05-10 | Stop reason: HOSPADM

## 2024-05-08 RX ORDER — MAGNESIUM HYDROXIDE 1200 MG/15ML
LIQUID ORAL CONTINUOUS PRN
Status: DISCONTINUED | OUTPATIENT
Start: 2024-05-08 | End: 2024-05-08 | Stop reason: HOSPADM

## 2024-05-08 RX ORDER — DEXTROSE MONOHYDRATE 100 MG/ML
INJECTION, SOLUTION INTRAVENOUS CONTINUOUS PRN
Status: DISCONTINUED | OUTPATIENT
Start: 2024-05-08 | End: 2024-05-10 | Stop reason: HOSPADM

## 2024-05-08 RX ORDER — ONDANSETRON 2 MG/ML
INJECTION INTRAMUSCULAR; INTRAVENOUS PRN
Status: DISCONTINUED | OUTPATIENT
Start: 2024-05-08 | End: 2024-05-08 | Stop reason: SDUPTHER

## 2024-05-08 RX ORDER — PANTOPRAZOLE SODIUM 40 MG/1
40 TABLET, DELAYED RELEASE ORAL
Status: DISCONTINUED | OUTPATIENT
Start: 2024-05-09 | End: 2024-05-10 | Stop reason: HOSPADM

## 2024-05-08 RX ORDER — FUROSEMIDE 20 MG/1
20 TABLET ORAL PRN
Status: DISCONTINUED | OUTPATIENT
Start: 2024-05-08 | End: 2024-05-09

## 2024-05-08 RX ORDER — SODIUM CHLORIDE 0.9 % (FLUSH) 0.9 %
5-40 SYRINGE (ML) INJECTION PRN
Status: DISCONTINUED | OUTPATIENT
Start: 2024-05-08 | End: 2024-05-08 | Stop reason: HOSPADM

## 2024-05-08 RX ORDER — OXYCODONE HYDROCHLORIDE 5 MG/1
5 TABLET ORAL ONCE
Status: DISCONTINUED | OUTPATIENT
Start: 2024-05-08 | End: 2024-05-08 | Stop reason: HOSPADM

## 2024-05-08 RX ORDER — GLUCAGON 1 MG/ML
1 KIT INJECTION PRN
Status: DISCONTINUED | OUTPATIENT
Start: 2024-05-08 | End: 2024-05-10 | Stop reason: HOSPADM

## 2024-05-08 RX ORDER — KETAMINE HCL IN NACL, ISO-OSM 100MG/10ML
SYRINGE (ML) INJECTION PRN
Status: DISCONTINUED | OUTPATIENT
Start: 2024-05-08 | End: 2024-05-08 | Stop reason: SDUPTHER

## 2024-05-08 RX ORDER — BUSPIRONE HYDROCHLORIDE 7.5 MG/1
7.5 TABLET ORAL 2 TIMES DAILY
Status: DISCONTINUED | OUTPATIENT
Start: 2024-05-08 | End: 2024-05-08 | Stop reason: SDUPTHER

## 2024-05-08 RX ORDER — SODIUM CHLORIDE, SODIUM LACTATE, POTASSIUM CHLORIDE, CALCIUM CHLORIDE 600; 310; 30; 20 MG/100ML; MG/100ML; MG/100ML; MG/100ML
INJECTION, SOLUTION INTRAVENOUS CONTINUOUS PRN
Status: DISCONTINUED | OUTPATIENT
Start: 2024-05-08 | End: 2024-05-08 | Stop reason: SDUPTHER

## 2024-05-08 RX ORDER — EPHEDRINE SULFATE/0.9% NACL/PF 25 MG/5 ML
SYRINGE (ML) INTRAVENOUS PRN
Status: DISCONTINUED | OUTPATIENT
Start: 2024-05-08 | End: 2024-05-08 | Stop reason: SDUPTHER

## 2024-05-08 RX ORDER — SODIUM CHLORIDE 9 MG/ML
INJECTION, SOLUTION INTRAVENOUS PRN
Status: DISCONTINUED | OUTPATIENT
Start: 2024-05-08 | End: 2024-05-08 | Stop reason: HOSPADM

## 2024-05-08 RX ADMIN — FENTANYL CITRATE 50 MCG: 50 INJECTION, SOLUTION INTRAMUSCULAR; INTRAVENOUS at 08:16

## 2024-05-08 RX ADMIN — FENTANYL CITRATE 50 MCG: 50 INJECTION, SOLUTION INTRAMUSCULAR; INTRAVENOUS at 12:16

## 2024-05-08 RX ADMIN — Medication 30 MG: at 10:50

## 2024-05-08 RX ADMIN — BUSPIRONE HYDROCHLORIDE 7.5 MG: 5 TABLET ORAL at 21:48

## 2024-05-08 RX ADMIN — BUPIVACAINE HYDROCHLORIDE 20 ML: 5 INJECTION, SOLUTION EPIDURAL; INTRACAUDAL at 08:21

## 2024-05-08 RX ADMIN — FENTANYL CITRATE 50 MCG: 50 INJECTION, SOLUTION INTRAMUSCULAR; INTRAVENOUS at 10:52

## 2024-05-08 RX ADMIN — SODIUM CHLORIDE, POTASSIUM CHLORIDE, SODIUM LACTATE AND CALCIUM CHLORIDE: 600; 310; 30; 20 INJECTION, SOLUTION INTRAVENOUS at 09:19

## 2024-05-08 RX ADMIN — PHENYLEPHRINE HYDROCHLORIDE 100 MCG: 10 INJECTION INTRAVENOUS at 13:13

## 2024-05-08 RX ADMIN — BUPIVACAINE HYDROCHLORIDE 150 MG: 5 INJECTION, SOLUTION EPIDURAL; INTRACAUDAL at 08:16

## 2024-05-08 RX ADMIN — SODIUM CHLORIDE, POTASSIUM CHLORIDE, SODIUM LACTATE AND CALCIUM CHLORIDE: 600; 310; 30; 20 INJECTION, SOLUTION INTRAVENOUS at 08:42

## 2024-05-08 RX ADMIN — ERGOCALCIFEROL 50000 UNITS: 1.25 CAPSULE ORAL at 21:48

## 2024-05-08 RX ADMIN — ONDANSETRON 4 MG: 2 INJECTION INTRAMUSCULAR; INTRAVENOUS at 14:16

## 2024-05-08 RX ADMIN — CEFAZOLIN 2 G: 1 INJECTION, POWDER, FOR SOLUTION INTRAMUSCULAR; INTRAVENOUS at 12:51

## 2024-05-08 RX ADMIN — EPHEDRINE SULFATE 10 MG: 5 INJECTION INTRAVENOUS at 11:31

## 2024-05-08 RX ADMIN — EPHEDRINE SULFATE 5 MG: 5 INJECTION INTRAVENOUS at 11:26

## 2024-05-08 RX ADMIN — PHENYLEPHRINE HYDROCHLORIDE 200 MCG: 10 INJECTION INTRAVENOUS at 13:34

## 2024-05-08 RX ADMIN — Medication 20 MG: at 11:07

## 2024-05-08 RX ADMIN — EPHEDRINE SULFATE 10 MG: 5 INJECTION INTRAVENOUS at 08:55

## 2024-05-08 RX ADMIN — Medication 2000 MG: at 21:51

## 2024-05-08 RX ADMIN — PHENYLEPHRINE HYDROCHLORIDE 100 MCG: 10 INJECTION INTRAVENOUS at 13:43

## 2024-05-08 RX ADMIN — DEXAMETHASONE SODIUM PHOSPHATE 10 MG: 10 INJECTION INTRAMUSCULAR; INTRAVENOUS at 09:04

## 2024-05-08 RX ADMIN — EPHEDRINE SULFATE 5 MG: 5 INJECTION INTRAVENOUS at 13:07

## 2024-05-08 RX ADMIN — SODIUM CHLORIDE, POTASSIUM CHLORIDE, SODIUM LACTATE AND CALCIUM CHLORIDE: 600; 310; 30; 20 INJECTION, SOLUTION INTRAVENOUS at 08:22

## 2024-05-08 RX ADMIN — SUGAMMADEX 300 MG: 100 INJECTION, SOLUTION INTRAVENOUS at 15:19

## 2024-05-08 RX ADMIN — SODIUM CHLORIDE, POTASSIUM CHLORIDE, SODIUM LACTATE AND CALCIUM CHLORIDE: 600; 310; 30; 20 INJECTION, SOLUTION INTRAVENOUS at 13:56

## 2024-05-08 RX ADMIN — BUPIVACAINE HYDROCHLORIDE 10 ML: 5 INJECTION, SOLUTION EPIDURAL; INTRACAUDAL; PERINEURAL at 08:23

## 2024-05-08 RX ADMIN — SACUBITRIL AND VALSARTAN 1 TABLET: 49; 51 TABLET, FILM COATED ORAL at 21:48

## 2024-05-08 RX ADMIN — ROCURONIUM BROMIDE 40 MG: 10 INJECTION, SOLUTION INTRAVENOUS at 08:34

## 2024-05-08 RX ADMIN — PHENYLEPHRINE HYDROCHLORIDE 100 MCG: 10 INJECTION INTRAVENOUS at 14:02

## 2024-05-08 RX ADMIN — CITALOPRAM 40 MG: 20 TABLET, FILM COATED ORAL at 21:48

## 2024-05-08 RX ADMIN — MIDAZOLAM HYDROCHLORIDE 1 MG: 1 INJECTION, SOLUTION INTRAMUSCULAR; INTRAVENOUS at 08:16

## 2024-05-08 RX ADMIN — FENTANYL CITRATE 50 MCG: 50 INJECTION, SOLUTION INTRAMUSCULAR; INTRAVENOUS at 08:33

## 2024-05-08 RX ADMIN — ONDANSETRON 4 MG: 2 INJECTION INTRAMUSCULAR; INTRAVENOUS at 15:51

## 2024-05-08 RX ADMIN — LIDOCAINE HYDROCHLORIDE 50 MG: 10 INJECTION, SOLUTION EPIDURAL; INFILTRATION; INTRACAUDAL; PERINEURAL at 08:33

## 2024-05-08 RX ADMIN — PROPOFOL 50 MG: 10 INJECTION, EMULSION INTRAVENOUS at 14:32

## 2024-05-08 RX ADMIN — PROPOFOL 150 MG: 10 INJECTION, EMULSION INTRAVENOUS at 08:33

## 2024-05-08 RX ADMIN — INSULIN GLARGINE 60 UNITS: 100 INJECTION, SOLUTION SUBCUTANEOUS at 22:01

## 2024-05-08 RX ADMIN — EPHEDRINE SULFATE 10 MG: 5 INJECTION INTRAVENOUS at 09:31

## 2024-05-08 RX ADMIN — FENTANYL CITRATE 50 MCG: 50 INJECTION, SOLUTION INTRAMUSCULAR; INTRAVENOUS at 10:40

## 2024-05-08 RX ADMIN — Medication 2 G: at 08:46

## 2024-05-08 RX ADMIN — SODIUM CHLORIDE: 9 INJECTION, SOLUTION INTRAVENOUS at 16:32

## 2024-05-08 RX ADMIN — EPHEDRINE SULFATE 10 MG: 5 INJECTION INTRAVENOUS at 11:38

## 2024-05-08 RX ADMIN — SODIUM CHLORIDE, PRESERVATIVE FREE 10 ML: 5 INJECTION INTRAVENOUS at 21:51

## 2024-05-08 RX ADMIN — PHENYLEPHRINE HYDROCHLORIDE 100 MCG: 10 INJECTION INTRAVENOUS at 13:22

## 2024-05-08 RX ADMIN — ACETAMINOPHEN 650 MG: 325 TABLET ORAL at 22:00

## 2024-05-08 RX ADMIN — DOCUSATE SODIUM 100 MG: 100 CAPSULE, LIQUID FILLED ORAL at 22:00

## 2024-05-08 RX ADMIN — GABAPENTIN 300 MG: 300 CAPSULE ORAL at 22:00

## 2024-05-08 ASSESSMENT — PAIN SCALES - GENERAL
PAINLEVEL_OUTOF10: 4

## 2024-05-08 ASSESSMENT — PAIN DESCRIPTION - FREQUENCY: FREQUENCY: CONTINUOUS

## 2024-05-08 ASSESSMENT — PAIN DESCRIPTION - LOCATION
LOCATION: ANKLE

## 2024-05-08 ASSESSMENT — LIFESTYLE VARIABLES: SMOKING_STATUS: 0

## 2024-05-08 ASSESSMENT — ENCOUNTER SYMPTOMS
DYSPNEA ACTIVITY LEVEL: NO INTERVAL CHANGE
SHORTNESS OF BREATH: 1

## 2024-05-08 ASSESSMENT — PAIN DESCRIPTION - ORIENTATION
ORIENTATION: LEFT

## 2024-05-08 ASSESSMENT — PAIN DESCRIPTION - PAIN TYPE: TYPE: SURGICAL PAIN;ACUTE PAIN

## 2024-05-08 ASSESSMENT — PAIN DESCRIPTION - ONSET: ONSET: ON-GOING

## 2024-05-08 ASSESSMENT — PAIN DESCRIPTION - DESCRIPTORS: DESCRIPTORS: DISCOMFORT

## 2024-05-08 NOTE — BRIEF OP NOTE
Brief Postoperative Note      Patient: Margy Valdez  YOB: 1945  MRN: [id number]    Date of Procedure: 5/8/2024    Pre-Op Diagnosis:  Left ankle posttraumatic arthritis  Retained hardware of left distal tibia    Post-Op Diagnosis:   Left ankle posttraumatic arthritis  Retained hardware of left distal tibia       Procedure(s):  Removal of hardware from left distal tibia  Left ankle open arthrodesis  Left subtalar arthrodesis    Surgeon(s):  Gonzales Hawkins DO    Assistant:  Resident: Alber Tse DPM; Julio Herrera DO; Patience Shankar DO    Anesthesia: General    Estimated Blood Loss (mL): 200 mL     Fluids: 2100 mL crystalloid     Tourniquet time: 130 minutes; deflated and re-inflated for 78 minutes     Complications: None    Specimens:   * No specimens in log *    Implants:  Pranav 12 x 300 mm T2 hindfoot arthrodesis nail and endcap  Implant Name Type Inv. Item Serial No.  Lot No. LRB No. Used Action   GRAFT BNE INJ 3 CC AUG - [id number]  GRAFT BNE INJ 3 CC AUG  [facility] [id number] Left 1 Implanted   PLATE      Left 2 Explanted   SCREWS      Left 13 Explanted   GRAFT BNE SUB 15CC 1 8MM CANC CRUSH CHIP READIGRFT - [id number]  GRAFT BNE SUB 15CC 1 8MM CANC CRUSH CHIP READIGRFT  [facility] T[facility]UE [facility]-WD  Left 1 Implanted   SCREW BNE L14.5MM DIA8MM SARA TI SHIRA NONLOCKING COMPR FOR - [id number]  SCREW BNE L14.5MM DIA8MM SARA TI SHIRA NONLOCKING COMPR FOR  [employer] [facility] [id number] Left 1 Implanted   NAIL IM L300MM [id number] L ANK TI ALLY SHIRA PRAFUL LAT BEND FOR - [id number]  NAIL IM L300MM [id number] L ANK TI ALLY SHIRA PRAFUL LAT BEND FOR  [employer] [facility] [id number] Left 1 Implanted   SCREW LK 5X40MM - [id number]  SCREW LK 5X40MM  [employer] [facility] [id number] Left 1 Implanted   LOCKING SCREW G6Z922 - [id number]  LOCKING SCREW [id number]  [employer]  [employer] [facility] [id number] Left 1 Implanted   LOCKING SCREW D5D093 - [id number]  LOCKING SCREW [id number]  [place] [employer] [facility] [id number] Left 1 Implanted   SCREW LK 5X70MM - [id number]  SCREW LK 5X70MM  [facility] [id number] Left 1 Implanted   ENDCAP ORTH L4MM DIA8MM L ANK TI EXTN - [id number]  ENDCAP ORTH L4MM DIA8MM L ANK TI EXTN  [place] [facility] [id number] Left 1 Implanted   SCREW BNE LCK 5X32.5 MM STRL T2 ALPHA - [id number]  SCREW BNE LCK 5X32.5 MM STRL T2 ALPHA  [place] [facility] [id number] Left 1 Implanted         Drains:   Urinary Catheter 05/08/24 Mirtha (Active)       Findings:  Infection Present At Time Of Surgery (PATOS) (choose all levels that have infection present):  No infection present  Other Findings: Retained hardware of left ankle; severe post traumatic osteoarthritis of the tibiotalar joint     Electronically signed by Gonzales Hawkins DO on 5/8/2024 at 3:01 PM

## 2024-05-08 NOTE — ANESTHESIA PROCEDURE NOTES
Peripheral Block    Patient location during procedure: pre-op  Reason for block: procedure for pain, post-op pain management and at surgeon's request  Start time: 5/8/2024 8:16 AM  End time: 5/8/2024 8:21 AM  Staffing  Performed: anesthesiologist   Anesthesiologist: Alexx Glez MD  Performed by: Alexx Glez MD  Authorized by: Alexx Glez MD    Preanesthetic Checklist  Completed: patient identified, IV checked, site marked, risks and benefits discussed, surgical/procedural consents, equipment checked, pre-op evaluation, timeout performed, anesthesia consent given, oxygen available, monitors applied/VS acknowledged, fire risk safety assessment completed and verbalized and blood product R/B/A discussed and consented  Peripheral Block   Patient position: supine  Prep: ChloraPrep  Provider prep: mask and sterile gloves  Patient monitoring: cardiac monitor, continuous pulse ox, frequent blood pressure checks, IV access, oxygen and responsive to questions  Block type: Sciatic  Popliteal  Laterality: left  Injection technique: single-shot  Guidance: ultrasound guided    Needle   Needle type: insulated echogenic nerve stimulator needle   Needle gauge: 22 G  Needle localization: ultrasound guidance  Needle length: 11 cm  Assessment   Injection assessment: negative aspiration for heme, no paresthesia on injection and local visualized surrounding nerve on ultrasound  Outcomes: patient tolerated procedure well and uncomplicated    Medications Administered  BUPivacaine (MARCAINE) PF injection 0.5% - Perineural   20 mL - 5/8/2024 8:21:00 AM

## 2024-05-08 NOTE — DISCHARGE INSTRUCTIONS
Orthopedic Instructions:  -Weight bearing status: Non weightbearing to the left leg.  -Keep dressing clean and dry. Do not remove splint. If splint were to become wet or saturated or fall off, please notify the office.   -Physical Therapy for strengthening and gait training. Occupational therapy for activities of daily living.  -Ice (20 minutes on and off 1 hour) and elevate (above heart) as needed for swelling/pain.  -Drink plenty of fluids.  -Call the office or come to Emergency Room if signs of infection appear (hot, swollen, red, draining pus, fever).  -Take medications as prescribed.  -Wean off narcotics (Percocet/Norco) as soon as possible. Do not take Tylenol if still taking narcotics.  -No alcoholic beverages or driving/operating while taking narcotics.  -Follow up with Dr. Hawkins in his office 10-14 days after surgery. You have an appointment scheduled on 5/21/24 at 11:00 AM. Call [phone] if you need to reschedule.

## 2024-05-08 NOTE — ANESTHESIA PROCEDURE NOTES
Peripheral Block    Patient location during procedure: pre-op  Reason for block: procedure for pain, post-op pain management and at surgeon's request  Start time: 5/8/2024 8:21 AM  End time: 5/8/2024 8:23 AM  Staffing  Performed: anesthesiologist   Anesthesiologist: Alexx Glez MD  Performed by: Alexx Glez MD  Authorized by: Alexx Glez MD    Preanesthetic Checklist  Completed: patient identified, IV checked, site marked, risks and benefits discussed, surgical/procedural consents, equipment checked, pre-op evaluation, timeout performed, anesthesia consent given, oxygen available, monitors applied/VS acknowledged, fire risk safety assessment completed and verbalized and blood product R/B/A discussed and consented  Peripheral Block   Patient position: supine  Prep: ChloraPrep  Provider prep: mask and sterile gloves  Patient monitoring: cardiac monitor, continuous pulse ox, frequent blood pressure checks, IV access, oxygen and responsive to questions  Block type: Femoral  Adductor canal  Laterality: left  Injection technique: single-shot  Guidance: ultrasound guided    Needle   Needle type: insulated echogenic nerve stimulator needle   Needle gauge: 22 G  Needle localization: ultrasound guidance  Needle length: 11 cm  Assessment   Injection assessment: negative aspiration for heme, no paresthesia on injection and local visualized surrounding nerve on ultrasound  Outcomes: patient tolerated procedure well and uncomplicated    Medications Administered  BUPivacaine (MARCAINE) PF injection 0.5% - Perineural   10 mL - 5/8/2024 8:23:00 AM
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
## 2024-05-08 NOTE — ANESTHESIA PRE PROCEDURE
Applicable)    Cardiac Testing (If Applicable)     EKG (If Applicable) PAC's,inc QT        Medications prior to admission:   Prior to Admission medications    Medication Sig Start Date End Date Taking? Authorizing Provider   furosemide (LASIX) 20 MG tablet Take 1 tablet by mouth as needed    Twan Mcgraw MD BASAGLAR KWIKPEN 100 UNIT/ML injection pen Inject into the skin 2 times daily 1/31/24   Twan Mcgraw MD   insulin regular (HUMULIN R;NOVOLIN R) 100 UNIT/ML injection Inject into the skin See Admin Instructions 28 units in before breakfast,  58 units before supper    Twan Mcgraw MD   aspirin 81 MG chewable tablet Take 1 tablet by mouth daily    Twan Mcgraw MD   gabapentin (NEURONTIN) 300 MG capsule TAKE 1 CAPSULE BY MOUTH THREE TIMES DAILY 2/15/23 5/8/24  Brisa Rodriges APRN - CNP   vitamin D (ERGOCALCIFEROL) 1.25 MG (18261 UT) CAPS capsule Take 1 capsule by mouth once a week for 8 doses 11/23/22 4/26/24  Brisa Rodriges APRN - CNP   docusate sodium (COLACE) 100 MG capsule Take 1 capsule by mouth 2 times daily 3/29/22   Brisa Rodriges APRN - CNP   naproxen (NAPROSYN) 500 MG tablet Take 1 tablet by mouth 2 times daily (with meals) 3/17/22   Grant Arriaga DO   SYNTHROID 125 MCG tablet TAKE 1 TABLET BY MOUTH ONCE DAILY 3/11/22   Twan Mcgraw MD   Atorvastatin Calcium (LIPITOR PO) Take 80 mg by mouth daily    Twan Mcgraw MD   esomeprazole (NEXIUM) 40 MG delayed release capsule Take 1 capsule by mouth every morning (before breakfast)    Twan Mcgraw MD   alendronate (FOSAMAX) 70 MG tablet Take 1 tablet by mouth once a week Mondays    Twan Mcgraw MD   busPIRone (BUSPAR) 7.5 MG tablet Take 1 tablet by mouth 2 times daily 11/23/21   Twan Mcgraw MD   citalopram (CELEXA) 40 MG tablet TAKE 1 TABLET BY MOUTH ONCE DAILY 11/29/21   Twan Mcgraw MD   dicyclomine (BENTYL) 10 MG capsule Take 1 capsule by mouth 2 times daily as needed

## 2024-05-09 LAB
ANION GAP SERPL CALCULATED.3IONS-SCNC: 11 MMOL/L (ref 9–16)
BASOPHILS # BLD: <0.03 K/UL (ref 0–0.2)
BASOPHILS NFR BLD: 0 % (ref 0–2)
BUN SERPL-MCNC: 14 MG/DL (ref 8–23)
CALCIUM SERPL-MCNC: 8.3 MG/DL (ref 8.6–10.4)
CHLORIDE SERPL-SCNC: 104 MMOL/L (ref 98–107)
CO2 SERPL-SCNC: 20 MMOL/L (ref 20–31)
CREAT SERPL-MCNC: 1 MG/DL (ref 0.5–0.9)
EOSINOPHIL # BLD: <0.03 K/UL (ref 0–0.44)
EOSINOPHILS RELATIVE PERCENT: 0 % (ref 1–4)
ERYTHROCYTE [DISTWIDTH] IN BLOOD BY AUTOMATED COUNT: 15.6 % (ref 11.8–14.4)
GFR, ESTIMATED: 57 ML/MIN/1.73M2
GLUCOSE BLD-MCNC: 202 MG/DL (ref 65–105)
GLUCOSE BLD-MCNC: 220 MG/DL (ref 65–105)
GLUCOSE BLD-MCNC: 225 MG/DL (ref 65–105)
GLUCOSE BLD-MCNC: 242 MG/DL (ref 65–105)
GLUCOSE SERPL-MCNC: 214 MG/DL (ref 74–99)
HCT VFR BLD AUTO: 32.1 % (ref 36.3–47.1)
HGB BLD-MCNC: 9.6 G/DL (ref 11.9–15.1)
IMM GRANULOCYTES # BLD AUTO: 0.05 K/UL (ref 0–0.3)
IMM GRANULOCYTES NFR BLD: 1 %
LYMPHOCYTES NFR BLD: 1.88 K/UL (ref 1.1–3.7)
LYMPHOCYTES RELATIVE PERCENT: 20 % (ref 24–43)
MCH RBC QN AUTO: 27.5 PG (ref 25.2–33.5)
MCHC RBC AUTO-ENTMCNC: 29.9 G/DL (ref 28.4–34.8)
MCV RBC AUTO: 92 FL (ref 82.6–102.9)
MONOCYTES NFR BLD: 1.18 K/UL (ref 0.1–1.2)
MONOCYTES NFR BLD: 12 % (ref 3–12)
NEUTROPHILS NFR BLD: 67 % (ref 36–65)
NEUTS SEG NFR BLD: 6.48 K/UL (ref 1.5–8.1)
NRBC BLD-RTO: 0 PER 100 WBC
PLATELET # BLD AUTO: 201 K/UL (ref 138–453)
PMV BLD AUTO: 9.6 FL (ref 8.1–13.5)
POTASSIUM SERPL-SCNC: 4.4 MMOL/L (ref 3.7–5.3)
RBC # BLD AUTO: 3.49 M/UL (ref 3.95–5.11)
RBC # BLD: ABNORMAL 10*6/UL
SODIUM SERPL-SCNC: 135 MMOL/L (ref 136–145)
WBC OTHER # BLD: 9.6 K/UL (ref 3.5–11.3)

## 2024-05-09 PROCEDURE — 6360000002 HC RX W HCPCS: Performed by: STUDENT IN AN ORGANIZED HEALTH CARE EDUCATION/TRAINING PROGRAM

## 2024-05-09 PROCEDURE — 6370000000 HC RX 637 (ALT 250 FOR IP): Performed by: STUDENT IN AN ORGANIZED HEALTH CARE EDUCATION/TRAINING PROGRAM

## 2024-05-09 PROCEDURE — 80048 BASIC METABOLIC PNL TOTAL CA: CPT

## 2024-05-09 PROCEDURE — 97530 THERAPEUTIC ACTIVITIES: CPT

## 2024-05-09 PROCEDURE — 97535 SELF CARE MNGMENT TRAINING: CPT

## 2024-05-09 PROCEDURE — 97162 PT EVAL MOD COMPLEX 30 MIN: CPT

## 2024-05-09 PROCEDURE — 36415 COLL VENOUS BLD VENIPUNCTURE: CPT

## 2024-05-09 PROCEDURE — 2580000003 HC RX 258: Performed by: NURSE PRACTITIONER

## 2024-05-09 PROCEDURE — 6370000000 HC RX 637 (ALT 250 FOR IP)

## 2024-05-09 PROCEDURE — 85025 COMPLETE CBC W/AUTO DIFF WBC: CPT

## 2024-05-09 PROCEDURE — 97166 OT EVAL MOD COMPLEX 45 MIN: CPT

## 2024-05-09 PROCEDURE — 82947 ASSAY GLUCOSE BLOOD QUANT: CPT

## 2024-05-09 PROCEDURE — 2580000003 HC RX 258: Performed by: STUDENT IN AN ORGANIZED HEALTH CARE EDUCATION/TRAINING PROGRAM

## 2024-05-09 PROCEDURE — 99232 SBSQ HOSP IP/OBS MODERATE 35: CPT | Performed by: STUDENT IN AN ORGANIZED HEALTH CARE EDUCATION/TRAINING PROGRAM

## 2024-05-09 PROCEDURE — 6370000000 HC RX 637 (ALT 250 FOR IP): Performed by: NURSE PRACTITIONER

## 2024-05-09 PROCEDURE — 1200000000 HC SEMI PRIVATE

## 2024-05-09 RX ORDER — FUROSEMIDE 20 MG/1
20 TABLET ORAL PRN
Status: DISCONTINUED | OUTPATIENT
Start: 2024-05-09 | End: 2024-05-10 | Stop reason: HOSPADM

## 2024-05-09 RX ORDER — CHOLECALCIFEROL (VITAMIN D3) 125 MCG
5 CAPSULE ORAL NIGHTLY PRN
Status: DISCONTINUED | OUTPATIENT
Start: 2024-05-09 | End: 2024-05-10 | Stop reason: HOSPADM

## 2024-05-09 RX ORDER — INSULIN LISPRO 100 [IU]/ML
0-8 INJECTION, SOLUTION INTRAVENOUS; SUBCUTANEOUS
Status: DISCONTINUED | OUTPATIENT
Start: 2024-05-09 | End: 2024-05-10 | Stop reason: HOSPADM

## 2024-05-09 RX ORDER — INSULIN LISPRO 100 [IU]/ML
0-4 INJECTION, SOLUTION INTRAVENOUS; SUBCUTANEOUS NIGHTLY
Status: DISCONTINUED | OUTPATIENT
Start: 2024-05-09 | End: 2024-05-10 | Stop reason: HOSPADM

## 2024-05-09 RX ADMIN — ACETAMINOPHEN 650 MG: 325 TABLET ORAL at 09:28

## 2024-05-09 RX ADMIN — ASPIRIN 81 MG: 81 TABLET, COATED ORAL at 05:16

## 2024-05-09 RX ADMIN — LEVOTHYROXINE SODIUM 125 MCG: 0.12 TABLET ORAL at 05:14

## 2024-05-09 RX ADMIN — SACUBITRIL AND VALSARTAN 1 TABLET: 49; 51 TABLET, FILM COATED ORAL at 09:27

## 2024-05-09 RX ADMIN — INSULIN GLARGINE 60 UNITS: 100 INJECTION, SOLUTION SUBCUTANEOUS at 22:09

## 2024-05-09 RX ADMIN — ACETAMINOPHEN 650 MG: 325 TABLET ORAL at 05:14

## 2024-05-09 RX ADMIN — SODIUM CHLORIDE: 9 INJECTION, SOLUTION INTRAVENOUS at 03:50

## 2024-05-09 RX ADMIN — ASPIRIN 81 MG: 81 TABLET, COATED ORAL at 20:21

## 2024-05-09 RX ADMIN — GABAPENTIN 300 MG: 300 CAPSULE ORAL at 14:12

## 2024-05-09 RX ADMIN — ACETAMINOPHEN 650 MG: 325 TABLET ORAL at 20:22

## 2024-05-09 RX ADMIN — INSULIN GLARGINE 80 UNITS: 100 INJECTION, SOLUTION SUBCUTANEOUS at 09:32

## 2024-05-09 RX ADMIN — DOCUSATE SODIUM 100 MG: 100 CAPSULE, LIQUID FILLED ORAL at 20:21

## 2024-05-09 RX ADMIN — Medication 2000 MG: at 05:17

## 2024-05-09 RX ADMIN — INSULIN LISPRO 2 UNITS: 100 INJECTION, SOLUTION INTRAVENOUS; SUBCUTANEOUS at 09:32

## 2024-05-09 RX ADMIN — GABAPENTIN 300 MG: 300 CAPSULE ORAL at 09:28

## 2024-05-09 RX ADMIN — INSULIN LISPRO 2 UNITS: 100 INJECTION, SOLUTION INTRAVENOUS; SUBCUTANEOUS at 12:42

## 2024-05-09 RX ADMIN — SODIUM CHLORIDE, PRESERVATIVE FREE 10 ML: 5 INJECTION INTRAVENOUS at 20:21

## 2024-05-09 RX ADMIN — PANTOPRAZOLE SODIUM 40 MG: 40 TABLET, DELAYED RELEASE ORAL at 05:14

## 2024-05-09 RX ADMIN — ACETAMINOPHEN 650 MG: 325 TABLET ORAL at 17:56

## 2024-05-09 RX ADMIN — GABAPENTIN 300 MG: 300 CAPSULE ORAL at 20:21

## 2024-05-09 RX ADMIN — ATORVASTATIN CALCIUM 80 MG: 80 TABLET, FILM COATED ORAL at 09:27

## 2024-05-09 RX ADMIN — OXYCODONE 5 MG: 5 TABLET ORAL at 14:12

## 2024-05-09 RX ADMIN — CITALOPRAM 40 MG: 20 TABLET, FILM COATED ORAL at 09:28

## 2024-05-09 RX ADMIN — OXYCODONE 5 MG: 5 TABLET ORAL at 20:22

## 2024-05-09 RX ADMIN — EMPAGLIFLOZIN 25 MG: 25 TABLET, FILM COATED ORAL at 09:27

## 2024-05-09 RX ADMIN — DOCUSATE SODIUM 100 MG: 100 CAPSULE, LIQUID FILLED ORAL at 09:28

## 2024-05-09 RX ADMIN — BUSPIRONE HYDROCHLORIDE 7.5 MG: 5 TABLET ORAL at 09:28

## 2024-05-09 RX ADMIN — BUSPIRONE HYDROCHLORIDE 7.5 MG: 5 TABLET ORAL at 20:48

## 2024-05-09 RX ADMIN — SACUBITRIL AND VALSARTAN 1 TABLET: 49; 51 TABLET, FILM COATED ORAL at 20:48

## 2024-05-09 RX ADMIN — INSULIN LISPRO 2 UNITS: 100 INJECTION, SOLUTION INTRAVENOUS; SUBCUTANEOUS at 17:55

## 2024-05-09 RX ADMIN — Medication 5 MG: at 00:51

## 2024-05-09 ASSESSMENT — PAIN DESCRIPTION - ORIENTATION
ORIENTATION: LEFT

## 2024-05-09 ASSESSMENT — PAIN DESCRIPTION - DESCRIPTORS
DESCRIPTORS: DISCOMFORT

## 2024-05-09 ASSESSMENT — PAIN SCALES - GENERAL
PAINLEVEL_OUTOF10: 8
PAINLEVEL_OUTOF10: 4
PAINLEVEL_OUTOF10: 6
PAINLEVEL_OUTOF10: 3
PAINLEVEL_OUTOF10: 5
PAINLEVEL_OUTOF10: 5

## 2024-05-09 ASSESSMENT — PAIN SCALES - WONG BAKER: WONGBAKER_NUMERICALRESPONSE: NO HURT

## 2024-05-09 ASSESSMENT — PAIN DESCRIPTION - LOCATION
LOCATION: FOOT
LOCATION: LEG
LOCATION: ANKLE

## 2024-05-09 NOTE — PROGRESS NOTES
Assessment: Patient was reclining in her chair when  visited. Family was not present at the time. However, patient made mention of her children, grandchildren and great grandchildren. Patient was in good spirit and seemed to appreciate the treatment and care she was receiving. When asked how she was feeling, patient responded, I am feeing okay. Patient said she was raised Rastafarian.   Intervention:  maintained listening presence, offered support and prayed with patient.   Outcome: Patient expressed gratitude for the spiritual and emotional support she received.  Plan: Follow up visits recommended for more prayers and support.

## 2024-05-09 NOTE — PROGRESS NOTES
Orthopedic Progress Note    Patient:  Margy Valdez  YOB: 1945     78 y.o. female    Subjective:  Patient seen and examined this morning. No complaints or concerns. No issues overnight per nursing. Pain is well controlled on current regimen. Denies fever, HA, CP, SOB, N/V, dysuria, new numbness/tingling.  Will work with physical therapy today.    Vitals reviewed, afebrile    Objective:   Vitals:    05/09/24 0400   BP: (!) 159/62   Pulse: 67   Resp: 16   Temp: 98 °F (36.7 °C)   SpO2: 100%     Gen: NAD, cooperative    Cardiovascular: Regular rate   Respiratory: No acute respiratory distress, breathing comfortably on room air    Orthopedic Exam    LLE: Splint on.  It is clean, dry and intact.  Patient is able to flex and extend all exposed toes.  She has baseline numbness in the great toe, however has mild numbness in all toes (received preop block).  This was compartments are soft and easily compressible.  No bony crepitus felt on palpation.  Brisk capillary refill to all exposed toes.    No results for input(s): \"WBC\", \"HGB\", \"HCT\", \"PLT\", \"INR\", \"NA\", \"K\", \"BUN\", \"CREATININE\", \"GLUCOSE\" in the last 72 hours.    Invalid input(s): \"PT\", \"PTT\"   Meds:   Abx: Postop Ancef  DVT ppx: Aspirin 81 mg twice daily  See rec for complete list    Impression 78 y.o. female who is being seen for:    -Left ankle post traumatic arthritis     S/p: Left ankle hardware removal and arthrodesis DOS: 5/8    Plan  - No further plans for orthopedic intervention at this time  - Post-op Ancef q8h x2 doses  - Splint on LLE. Okay to reinforce/maintain by nursing if necessary. Please notify Ortho if saturated.  - NWB  to the LLE  - Pain control and medical management per orthopedics.   - Multimodal pain control ordered   - DVT ppx:  ASA 81mg BID.   - Ice/Elevate to control pain and edema  - Diet: Adult diet  - Encourage Incentive Spirometry use  - PT/OT will evaluate today   - F/u with Dr. Hawkins 5/21 in his office    - Please page Ortho on call with any questions        Julio Herrera DO  Orthopedic Surgery Resident, PGY-1  [place], [place]

## 2024-05-09 NOTE — CONSULTS
[facility]  Office: [phone]  Aubrey Montelongo DO, Lakhwinder Vargas DO, Ronald Tao DO, Nitesh Sarmiento DO, Ibis Ledesma MD, Diana Ansari MD, Merline Lucas MD, Anna Yeager MD,  Julio Balbuena MD, Nate Olivia MD, Ann Martin MD,  Eliz Rader DO, Bennie Bryan MD, Hong Alberto MD, Mohamud Montelongo DO, Nancy Bateman MD,  Shekhar Fernandez DO, Crissy Saldana MD, Cheryl Velasco MD, Liz Giles MD, Eva Allen MD,  David Moser MD, Daniel Duran MD, Costa Whatley MD, Fawn Anthony MD, Alonso Reynolds MD, Francisco Borrero MD, Laith Oneil DO, Jatinder Albert DO, Gabo Pappas MD,  Asad Browning MD, Shirley Waterhouse, CNP,  Sirena Bailey CNP, Norman Hall, CNP,  Leyda Ruvalcaba, DNP, Teresa Cleaning, CNP, Miley Escobedo, CNP, Jackie Jovel CNP, Chasidy Schulte, CNP, Blanca Khalil, PA-C, Sophie Pang PA-C, Denice Taylor, CNP, Dianna Galvez, CNP, Na Esquivel, CNP, Patricia Blackwell, CNP, Betzy Barragan, CNP, Masha Way, CNS, Lia Gabriel, CNP, Charlotte Santillan CNP, Tracy Schwab, CNP         [facility]   IN-PATIENT SERVICE   [facility]    CONSULTATION / HISTORY AND PHYSICAL EXAMINATION            Date:   5/8/2024  Patient name:  Margy Valdez  Date of admission:  5/8/2024  6:13 AM  MRN:   [id number]  Account:  [id number]  YOB: 1945  PCP:    Giuliano Fry APRN - CNP  Room:   [address] [place] [address]  Code Status:    Full Code    Physician Requesting Consult: Hawkins, Gonzales A, DO    Reason for Consult: Postop medical management    Chief Complaint:     Admitted for an elective Arthrodesis of  ankle and ankle hardware removal       History Obtained From:     Patient and medical records  History of Present Illness:     Ms. Valdez is a 78-year-old has a past medical history of Anxiety and depression, Arthritis, CAD/MI 2023(status post CABG 2023)/multiple cardiac stents, CHF -preserved ejection fraction, Chronic kidney disease,  normal bowel sounds, no hepatomegaly or splenomegaly  Neurologic: There are no new focal motor or sensory deficits, normal muscle tone and bulk, no abnormal sensation, normal speech, cranial nerves II through XII grossly intact  Skin: No gross lesions, rashes, bruising or bleeding on exposed skin area  Extremities:  peripheral pulses palpable, no pedal edema or calf pain with palpation  Psych: normal affect, pleasant    Investigations:      Laboratory Testing:  Recent Results (from the past 24 hour(s))   POC Glucose Fingerstick    Collection Time: 05/08/24  6:52 AM   Result Value Ref Range    POC Glucose 196 (H) 65 - 105 mg/dL   POC Glucose Fingerstick    Collection Time: 05/08/24  4:07 PM   Result Value Ref Range    POC Glucose 231 (H) 65 - 105 mg/dL       Imaging/Diagonstics:  XR ANKLE LEFT (MIN 3 VIEWS)    Result Date: 5/8/2024  No evidence of complication.     XR TIBIA FIBULA RIGHT (2 VIEWS)    Result Date: 5/8/2024  No evidence of complication.       Assessment :      Hospital Problems             Last Modified POA    * (Principal) Osteoarthritis of left ankle, unspecified osteoarthritis type 5/8/2024 Yes       Plan:     S/p left ankle hardware removal PID 0  Pain management with dilaudid for severe pain  Roxicodone for moderate or severe pain  Colace bid  Bowel regimen- glycolax daily prn  Ot/pt evaluaton   Fall precautions    2. H/o hf with preserved lvef-stable  Evoleumic on exam  Would be cautious with Ivf luids post op to avoid volume overload  Reduced to 50ml/hr  Resume home entresto  Lasix 20 mg prn    3. Dm -2 on daily insulin   Patient reports basal insulin Lantus 80 u q am 60 u q pm  Resume home dosing on basal insulin  Accuchesk ac and hs with sliding scale coverage  Resume Jardiance  Hypoglycemia protocol    Cardiac diabetic diet    4. Hypothyroidism-resume home dose of synthyroid 125mcg  5.h/o cad s/p cabg/cardiac stents   Resume statin, ASA    6. H/o LEE intolerant of cpap  Needs continuous  pulse
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
## 2024-05-09 NOTE — CARE COORDINATION
Transitional planning    Patient choices for home care are [facility] and [facility]. Referral to [facility].     1154 Cm called [facility], number not in service    1157 Next choice is [facility], referral sent    1237 Called Anjali with [facility] [phone] and they are able to accept patient. Jamin told her patient will be discharging to her dtr's house at [place] [address] in [facility]. HC is able to see her at her dtr's house.

## 2024-05-09 NOTE — PLAN OF CARE
Problem: Discharge Planning  Goal: Discharge to home or other facility with appropriate resources  Outcome: Progressing     Problem: Chronic Conditions and Co-morbidities  Goal: Patient's chronic conditions and co-morbidity symptoms are monitored and maintained or improved  Outcome: Progressing     Problem: Pain  Goal: Verbalizes/displays adequate comfort level or baseline comfort level  Outcome: Progressing     Problem: Safety - Adult  Goal: Free from fall injury  Outcome: Progressing     Problem: ABCDS Injury Assessment  Goal: Absence of physical injury  Outcome: Progressing
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
## 2024-05-09 NOTE — CARE COORDINATION
Case Management Assessment  Initial Evaluation    Date/Time of Evaluation: 5/9/2024 11:34 AM  Assessment Completed by: Jacy Agarwal RN    If patient is discharged prior to next notation, then this note serves as note for discharge by case management.    Patient Name: Margy Valdez                   YOB: 1945  Diagnosis: Retained orthopedic hardware [Z96.9]  Left ankle pain, unspecified chronicity [M25.572]  Osteoarthritis of left ankle, unspecified osteoarthritis type [M19.072]                   Date / Time: 5/8/2024  6:13 AM    Patient Admission Status: Inpatient   Readmission Risk (Low < 19, Mod (19-27), High > 27): Readmission Risk Score: 10.4    Current PCP: Giuliano Fry APRN - CNP  PCP verified by CM? (P) Yes    Chart Reviewed: Yes      History Provided by: Patient  Patient Orientation: Alert and Oriented, Person, Place, Situation, Self    Patient Cognition: Alert    Hospitalization in the last 30 days (Readmission):  No    If yes, Readmission Assessment in CM Navigator will be completed.    Advance Directives:      Code Status: Full Code   Patient's Primary Decision Maker is: (P) Legal Next of Kin      Discharge Planning:    Patient lives with: (P) Alone Type of Home: (P) Apartment  Primary Care Giver: Self  Patient Support Systems include: Children, Family Members   Current Financial resources: (P) Medicare  Current community resources: (P) None  Current services prior to admission: (P) Durable Medical Equipment            Current DME: (P) Wheelchair, Walker, Cane, Bedside Commode            Type of Home Care services:  (P) Nursing Services    ADLS  Prior functional level: (P) Assistance with the following:, Housework, Shopping  Current functional level: (P) Assistance with the following:, Mobility    PT AM-PAC: 15 /24  OT AM-PAC: 20 /24    Family can provide assistance at DC: (P) Yes  Would you like Case Management to discuss the discharge plan with any other family members/significant  others, and if so, who? (P) No  Plans to Return to Present Housing: (P) Yes  Other Identified Issues/Barriers to RETURNING to current housing:    Potential Assistance needed at discharge: (P) Home Care            Potential DME:    Patient expects to discharge to: (P) Other (comment) (dtr's house)  Plan for transportation at discharge: (P) Family    Financial    Payor: [employer] MEDICARE / Plan: [employer] MEDICARE ADVANTAGE HMO / Product Type: Medicare /     Does insurance require precert for SNF: Yes    Potential assistance Purchasing Medications: (P) No  Meds-to-Beds request: Yes      [facility] [address] [place] [address] [address] [facility] [phone] -  [phone] [address] [facility] [address]  Phone: [phone] Fax: [phone]      Notes:    Factors facilitating achievement of predicted outcomes: Family support    Barriers to discharge: Medical complications    Additional Case Management Notes: Plan is to discharge to her dtr Maryuri's house with home care. Gave her list, need choices    The Plan for Transition of Care is related to the following treatment goals of Retained orthopedic hardware [Z96.9]  Left ankle pain, unspecified chronicity [M25.572]  Osteoarthritis of left ankle, unspecified osteoarthritis type [M19.072]    IF APPLICABLE: The Patient and/or patient representative Margy and her family were provided with a choice of provider and agrees with the discharge plan. Freedom of choice list with basic dialogue that supports the patient's individualized plan of care/goals and shares the quality data associated with the providers was provided to: (P) Patient   Patient Representative Name:       The Patient and/or Patient Representative Agree with the Discharge Plan? (P) Yes    Jacy Agarwal RN  Case Management Department  Ph: [phone] Fax:

## 2024-05-09 NOTE — ANESTHESIA POSTPROCEDURE EVALUATION
Department of Anesthesiology  Postprocedure Note    Patient: Margy Valdez  MRN: [id number]  YOB: 1945  Date of evaluation: 5/9/2024    Procedure Summary       Date: 05/08/24 Room / Location: [address]    Anesthesia Start: 0826 Anesthesia Stop: 1535    Procedure: ANKLE HARDWARE REMOVAL, ANKLE ARTHORODESIS,  FREDI, C-ARM) (Left: Ankle) Diagnosis:       Retained orthopedic hardware      Left ankle pain, unspecified chronicity      (Retained orthopedic hardware [Z96.9])      (Left ankle pain, unspecified chronicity [M25.572])    Surgeons: Gonzales Hawkins DO Responsible Provider: Alexx Glez MD    Anesthesia Type: General ASA Status: 4            Anesthesia Type: General    Saad Phase I: Saad Score: 10    Saad Phase II: Saad Score: 10    Anesthesia Post Evaluation    Patient location during evaluation: bedside  Patient participation: complete - patient participated  Level of consciousness: awake  Airway patency: patent  Nausea & Vomiting: no nausea and no vomiting  Cardiovascular status: blood pressure returned to baseline  Respiratory status: acceptable  Hydration status: euvolemic  Comments: BP (!) 159/62   Pulse 67   Temp 98 °F (36.7 °C) (Oral)   Resp 16   Ht 1.6 m (5' 3\")   Wt 107.9 kg (237 lb 14 oz)   SpO2 100%   BMI 42.14 kg/m²     Pain management: adequate        No notable events documented.

## 2024-05-10 VITALS
DIASTOLIC BLOOD PRESSURE: 79 MMHG | SYSTOLIC BLOOD PRESSURE: 162 MMHG | WEIGHT: 237.88 LBS | OXYGEN SATURATION: 99 % | HEART RATE: 64 BPM | TEMPERATURE: 97.8 F | HEIGHT: 63 IN | BODY MASS INDEX: 42.15 KG/M2 | RESPIRATION RATE: 16 BRPM

## 2024-05-10 PROBLEM — Z96.9 RETAINED ORTHOPEDIC HARDWARE: Status: ACTIVE | Noted: 2024-05-10

## 2024-05-10 LAB
GLUCOSE BLD-MCNC: 154 MG/DL (ref 65–105)
GLUCOSE BLD-MCNC: 217 MG/DL (ref 65–105)

## 2024-05-10 PROCEDURE — 6370000000 HC RX 637 (ALT 250 FOR IP): Performed by: NURSE PRACTITIONER

## 2024-05-10 PROCEDURE — 82947 ASSAY GLUCOSE BLOOD QUANT: CPT

## 2024-05-10 PROCEDURE — 2580000003 HC RX 258: Performed by: STUDENT IN AN ORGANIZED HEALTH CARE EDUCATION/TRAINING PROGRAM

## 2024-05-10 PROCEDURE — 99232 SBSQ HOSP IP/OBS MODERATE 35: CPT | Performed by: STUDENT IN AN ORGANIZED HEALTH CARE EDUCATION/TRAINING PROGRAM

## 2024-05-10 PROCEDURE — 6370000000 HC RX 637 (ALT 250 FOR IP): Performed by: STUDENT IN AN ORGANIZED HEALTH CARE EDUCATION/TRAINING PROGRAM

## 2024-05-10 RX ADMIN — OXYCODONE 5 MG: 5 TABLET ORAL at 04:46

## 2024-05-10 RX ADMIN — ATORVASTATIN CALCIUM 80 MG: 80 TABLET, FILM COATED ORAL at 08:55

## 2024-05-10 RX ADMIN — EMPAGLIFLOZIN 25 MG: 25 TABLET, FILM COATED ORAL at 08:54

## 2024-05-10 RX ADMIN — ACETAMINOPHEN 650 MG: 325 TABLET ORAL at 04:47

## 2024-05-10 RX ADMIN — CITALOPRAM 40 MG: 20 TABLET, FILM COATED ORAL at 08:54

## 2024-05-10 RX ADMIN — ACETAMINOPHEN 650 MG: 325 TABLET ORAL at 15:38

## 2024-05-10 RX ADMIN — LEVOTHYROXINE SODIUM 125 MCG: 0.12 TABLET ORAL at 07:02

## 2024-05-10 RX ADMIN — PANTOPRAZOLE SODIUM 40 MG: 40 TABLET, DELAYED RELEASE ORAL at 07:02

## 2024-05-10 RX ADMIN — OXYCODONE 5 MG: 5 TABLET ORAL at 15:37

## 2024-05-10 RX ADMIN — GABAPENTIN 300 MG: 300 CAPSULE ORAL at 08:54

## 2024-05-10 RX ADMIN — ACETAMINOPHEN 650 MG: 325 TABLET ORAL at 12:10

## 2024-05-10 RX ADMIN — INSULIN GLARGINE 80 UNITS: 100 INJECTION, SOLUTION SUBCUTANEOUS at 08:55

## 2024-05-10 RX ADMIN — SACUBITRIL AND VALSARTAN 1 TABLET: 49; 51 TABLET, FILM COATED ORAL at 08:54

## 2024-05-10 RX ADMIN — INSULIN LISPRO 2 UNITS: 100 INJECTION, SOLUTION INTRAVENOUS; SUBCUTANEOUS at 12:10

## 2024-05-10 RX ADMIN — SODIUM CHLORIDE, PRESERVATIVE FREE 10 ML: 5 INJECTION INTRAVENOUS at 08:55

## 2024-05-10 RX ADMIN — OXYCODONE 5 MG: 5 TABLET ORAL at 09:05

## 2024-05-10 RX ADMIN — GABAPENTIN 300 MG: 300 CAPSULE ORAL at 15:37

## 2024-05-10 RX ADMIN — BUSPIRONE HYDROCHLORIDE 7.5 MG: 5 TABLET ORAL at 08:54

## 2024-05-10 RX ADMIN — ASPIRIN 81 MG: 81 TABLET, COATED ORAL at 08:54

## 2024-05-10 RX ADMIN — DOCUSATE SODIUM 100 MG: 100 CAPSULE, LIQUID FILLED ORAL at 08:54

## 2024-05-10 ASSESSMENT — PAIN DESCRIPTION - LOCATION
LOCATION: LEG
LOCATION: ANKLE

## 2024-05-10 ASSESSMENT — PAIN SCALES - GENERAL
PAINLEVEL_OUTOF10: 4
PAINLEVEL_OUTOF10: 3
PAINLEVEL_OUTOF10: 5
PAINLEVEL_OUTOF10: 7

## 2024-05-10 ASSESSMENT — PAIN DESCRIPTION - DESCRIPTORS
DESCRIPTORS: DISCOMFORT

## 2024-05-10 ASSESSMENT — PAIN DESCRIPTION - ORIENTATION
ORIENTATION: LEFT

## 2024-05-10 ASSESSMENT — PAIN SCALES - WONG BAKER: WONGBAKER_NUMERICALRESPONSE: NO HURT

## 2024-05-10 NOTE — PLAN OF CARE
Orthopedic surgery statement of need note    Margy Valdez was evaluated today and a DME order was entered for a wheeled walker because she requires this to successfully complete daily living tasks of ambulating.  A wheeled walker is necessary due to the patient's unsteady gait, upper body weakness, and inability to  an ambulation device; and she can ambulate only by pushing a walker instead of a lesser assistive device such as a cane, crutch, or standard walker.  The need for this equipment was discussed with the patient and she understands and is in agreement.       Preston Reaves MD  Orthopedic Surgery Resident, PGY-2  [place], [place]

## 2024-05-10 NOTE — PROGRESS NOTES
Orthopedic Progress Note    Patient:  Margy Valdez  YOB: 1945     78 y.o. female    Subjective:  Patient seen and examined resting comfortably in bed.  No complaints or concerns  No acute issue overnight per nursing or patient  Pain controlled  Denies fever, HA, CP, SOB, N/V, dysuria  Positive flatus.  Negative bowel movement  Ambulated 6 feet with PT yesterday.    Vitals reviewed    Objective:   Vitals:    05/09/24 2052   BP:    Pulse:    Resp: 18   Temp:    SpO2:        Gen: NAD, cooperative    Cardiovascular: Regular rate    Respiratory: Chest symmetric, no accessory muscle use, normal respirations, no audible wheezes    MSK: LLE: Splint on and c/d/I.  Able to flex and extend exposed digits and digits warm and well perfused.  Baseline dysesthesias to exposed digits.     Recent Labs     05/09/24  0717   WBC 9.6   HGB 9.6*   HCT 32.1*      *   K 4.4   BUN 14   CREATININE 1.0*   GLUCOSE 214*        DVT ppx: ASA 81 BID    See rec for complete list    Impression/plan: 79 y/o female, being seen for:    -Left ankle post traumatic arthritis, S/p Left ankle hardware removal and arthrodesis, POD # 2     Plan  - No further plans for orthopedic intervention at this time  - Splint on LLE. Okay to reinforce/maintain by nursing if necessary. Please notify Ortho if saturated.  - NWB  to the LLE  - Post op ancef completed  - Multimodal pain control ordered   - DVT ppx:  ASA 81mg BID.   - Ice/Elevate to control pain and edema  - Diet: Ok for Adult diet  - Encourage Incentive Spirometry use  - PT/OT will evaluate today   - Dispo: Likely today, home with daughter  - F/u with Dr. Hawkins 5/21 in his office   - Please page Ortho on call with any questions      Grant Arriaga DO   Orthopedic Surgery Resident PGY-3  [facility], [place], [place]

## 2024-05-10 NOTE — PROGRESS NOTES
[facility]  Office: [phone]  Aubrey Montelongo DO, Lakhwinder Vargas, DO, Ronald Tao DO, Nitesh Sarmiento, DO, Ibis Ledesma MD, Diana Ansari MD, Merline Lucas MD, Anna Yeager MD,  Julio Balbuena MD, Nate Olivia MD, Ann Martin MD,  Eliz Rader DO, Bennie Bryan MD, Hong Alberto MD, Mohamud Montelongo DO, Nancy Bateman MD,  Shekhar Fernandez DO, Crissy Saldana MD, Cheryl Velasco MD, Liz Giles MD, Eva Allen MD,  David Moser MD, Daniel Duran MD, Costa Whatley MD, Fawn Anthony MD, Alonso Reynolds MD, Francisco Borrero MD, Laith Oneil DO, Jatindre Albert DO, Gabo Pappas MD,  Asad Browning MD, Shirley Waterhouse, CNP,  Sirena Bailey CNP, Norman Hall, CNP,  Leyda Ruvalcaba, DNP, Teresa Cleaning, CNP, Miley Escobedo, CNP, Jackie Jovel CNP, Chasidy Schulte, CNP, Blanca Khalil, PA-C, Sophie Pang PA-C, Denice Taylor, CNP, Dianna Galvez, CNP, Na Esquivel, CNP, Patricia Blackwell, CNP, Betzy Barragan, CNP, Masha Way, CNS, Lia Gabriel, CNP, Charlotte Santillan CNP, Tracy Schwab, CNP         [facility]   IN-PATIENT SERVICE   [facility]    Progress Note    5/10/2024    2:20 PM    Name:   Margy Valdez  MRN:     [id number]     Acct:      [id number]   Room:   0247/0247-01   Day:  2  Admit Date:  5/8/2024  6:13 AM    PCP:   Giuliano Fry APRN - CNP  Code Status:  Full Code    Subjective:     C/C: ankle pain  Interval History Status: not changed.      Patient seen and examined  No chest pain or sob  Patient reports slight pain in the leg.  No other acute complaints.  Labs and vitals reviewed  Blood sugar around 170  Brief History:     78-year-old has a past medical history of Anxiety and depression, Arthritis, CAD/MI 2023(status post CABG 2023)/multiple cardiac stents, CHF -preserved ejection fraction, Chronic kidney disease, COVID-19, Diabetes mellitus (HCC)-on daily insulin, Diverticulosis, GERD (gastroesophageal reflux disease),    Net -900 ml         Labs:  Hematology:  Recent Labs     05/09/24  0717   WBC 9.6   RBC 3.49*   HGB 9.6*   HCT 32.1*   MCV 92.0   MCH 27.5   MCHC 29.9   RDW 15.6*      MPV 9.6       Chemistry:  Recent Labs     05/09/24  0717   *   K 4.4      CO2 20   GLUCOSE 214*   BUN 14   CREATININE 1.0*   ANIONGAP 11   LABGLOM 57*   CALCIUM 8.3*     Recent Labs     05/09/24  0611 05/09/24  1212 05/09/24  1632 05/09/24  2050 05/10/24  0701 05/10/24  1134   POCGLU 242* 220* 225* 202* 154* 217*       ABG:  Lab Results   Component Value Date/Time    FIO2 INFORMATION NOT PROVIDED 02/09/2022 10:06 AM     No results found for: \"SPECIAL\"  No results found for: \"CULTURE\"    Radiology:  XR ANKLE LEFT (MIN 3 VIEWS)    Result Date: 5/8/2024  No evidence of complication.     XR TIBIA FIBULA RIGHT (2 VIEWS)    Result Date: 5/8/2024  No evidence of complication.       Physical Examination:        General appearance:  alert, cooperative and no distress  Mental Status:  oriented to person, place and time and normal affect  Lungs:  clear to auscultation bilaterally, normal effort  Heart:  regular rate and rhythm, no murmur  Abdomen:  soft, nontender, nondistended, normal bowel sounds, no masses, hepatomegaly, splenomegaly  Extremities: left ankle and calf in dressing support  Skin:  no gross lesions, rashes, induration    Assessment:        Hospital Problems             Last Modified POA    * (Principal) Osteoarthritis of left ankle, unspecified osteoarthritis type 5/8/2024 Yes    Chronic diastolic (congestive) heart failure (HCC) 5/9/2024 Yes    LEE (obstructive sleep apnea) 5/9/2024 Yes    

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
## 2024-05-10 NOTE — DISCHARGE INSTR - COC
Continuity of Care Form    Patient Name: Margy Valdez   :  1945  MRN:  [id number]    Admit date:  2024  Discharge date:  05/10/2024    Code Status Order: Full Code   Advance Directives:   Advance Care Flowsheet Documentation       Date/Time Healthcare Directive Type of Healthcare Directive Copy in Chart Healthcare Agent Appointed Healthcare Agent's Name Healthcare Agent's Phone Number    24 0635 No, patient does not have an advance directive for healthcare treatment -- -- -- -- --            Admitting Physician:  Gonzales Hawkins DO  PCP: Giuliano Fry APRN - CNP    Discharging Nurse: Michelle HENNESSY  Discharging Hospital Unit/Room#: 0247/0247-01  Discharging Unit Phone Number: [phone]    Emergency Contact:   Extended Emergency Contact Information  Primary Emergency Contact: jayson jacobsen  Home Phone: [phone]  Relation: Child  Secondary Emergency Contact: Maryuri Valdez  Home Phone: [phone]  Relation: Child  Preferred language: English   needed? No    Past Surgical History:  Past Surgical History:   Procedure Laterality Date    ANKLE FRACTURE SURGERY Left 02/10/2022    CLOSED REDUCTION LEFT PILON   APPLICATION OF RING EXTERNAL FIXATOR performed by Gonzales aHwkins DO at [facility] OR    ANKLE FRACTURE SURGERY Left 2022    REVISION OF LEFT ANKLE EXTERNAL FIXATOR ( SYNTHES, PRE OP NERVE BLOCK, 3080, SUPINE, C ARM, SYNTHES MAX FRAME) performed by Gonzales Hawkins DO at [facility] OR    ANKLE FRACTURE SURGERY Left 2024    ANKLE HARDWARE REMOVAL, ANKLE ARTHORODESIS,  FREDI, C-ARM) performed by Gonzales Hawkins DO at [facility] OR    ANKLE SURGERY Left 2022    EX-FIX REMOVAL ANKLE LEFT performed by Gonzales Hawkins DO at [facility] OR    CARDIAC CATHETERIZATION  10/2013    1 stent placed    CARDIAC CATHETERIZATION  2014    1 stent placed    CARDIAC CATHETERIZATION  2016    CARDIAC CATHETERIZATION  10/18/2021     chronic diastolic heart failure, at [facility]    CARDIAC
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
## 2024-05-10 NOTE — CARE COORDINATION
Transition planning  Called and updated Adriana Heath on-call that patient is discharging home today.

## 2024-05-10 NOTE — PLAN OF CARE
Problem: Discharge Planning  Goal: Discharge to home or other facility with appropriate resources  5/10/2024 1718 by Michelle Flor RN  Outcome: Adequate for Discharge  5/10/2024 0321 by Linda Gibbs RN  Outcome: Progressing     Problem: Chronic Conditions and Co-morbidities  Goal: Patient's chronic conditions and co-morbidity symptoms are monitored and maintained or improved  5/10/2024 1718 by Michelle Flor RN  Outcome: Adequate for Discharge  5/10/2024 0321 by Linda Gibbs RN  Outcome: Progressing     Problem: Pain  Goal: Verbalizes/displays adequate comfort level or baseline comfort level  5/10/2024 1718 by Michelle Flor RN  Outcome: Adequate for Discharge  5/10/2024 0321 by Linda Gibbs RN  Outcome: Progressing     Problem: Safety - Adult  Goal: Free from fall injury  5/10/2024 1718 by Michelle Flor RN  Outcome: Adequate for Discharge  5/10/2024 0321 by Linda Gibbs RN  Outcome: Progressing     Problem: ABCDS Injury Assessment  Goal: Absence of physical injury  5/10/2024 1718 by Michelle Flor RN  Outcome: Adequate for Discharge  5/10/2024 0321 by Linda Gibbs RN  Outcome: Progressing

## 2024-05-13 ENCOUNTER — TELEPHONE (OUTPATIENT)
Dept: ORTHOPEDIC SURGERY | Age: 79
End: 2024-05-13

## 2024-05-13 NOTE — TELEPHONE ENCOUNTER
Date of Procedure: 5/8/2024     Pre-Op Diagnosis:  Left ankle posttraumatic arthritis  Retained hardware of left distal tibia     Post-Op Diagnosis:   Left ankle posttraumatic arthritis  Retained hardware of left distal tibia       Procedure(s):  Removal of hardware from left distal tibia  Left ankle open arthrodesis  Left subtalar arthrodesis      Patient daughter called in and stated that when she was putting patient into her car, the door hit the bottom of patients left foot and she is having increased pain. Please advise if you want to order outpatient xray or have patient come in for appointment tomorrow. Thank you

## 2024-05-21 ENCOUNTER — HOSPITAL ENCOUNTER (OUTPATIENT)
Age: 79
Discharge: HOME | End: 2024-05-21
Payer: MEDICARE

## 2024-05-21 ENCOUNTER — OFFICE VISIT (OUTPATIENT)
Dept: ORTHOPEDIC SURGERY | Age: 79
End: 2024-05-21

## 2024-05-21 VITALS — BODY MASS INDEX: 41.99 KG/M2 | HEIGHT: 63 IN | WEIGHT: 237 LBS

## 2024-05-21 DIAGNOSIS — M19.172 POST-TRAUMATIC ARTHRITIS OF LEFT ANKLE: ICD-10-CM

## 2024-05-21 DIAGNOSIS — E55.9 HYPOVITAMINOSIS D: Primary | ICD-10-CM

## 2024-05-21 DIAGNOSIS — E55.9 VITAMIN D DEFICIENCY: ICD-10-CM

## 2024-05-21 DIAGNOSIS — E55.9 HYPOVITAMINOSIS D: ICD-10-CM

## 2024-05-21 LAB — 25(OH)D3 SERPL-MCNC: 42.6 NG/ML (ref 30–100)

## 2024-05-21 PROCEDURE — 82306 VITAMIN D 25 HYDROXY: CPT

## 2024-05-21 PROCEDURE — 36415 COLL VENOUS BLD VENIPUNCTURE: CPT

## 2024-05-21 RX ORDER — ERGOCALCIFEROL 1.25 MG/1
50000 CAPSULE ORAL WEEKLY
Qty: 12 CAPSULE | Refills: 1 | Status: SHIPPED | OUTPATIENT
Start: 2024-05-21

## 2024-05-21 RX ORDER — METHOCARBAMOL 750 MG/1
750 TABLET, FILM COATED ORAL 4 TIMES DAILY PRN
Qty: 40 TABLET | Refills: 0 | Status: SHIPPED | OUTPATIENT
Start: 2024-05-21 | End: 2024-05-31

## 2024-05-21 NOTE — PROGRESS NOTES
[facility]  [address]  [place] [address]  Dept Phone: [phone]  Dept Fax: [phone]      Orthopaedic Trauma Clinic Follow Up      Subjective:   Date of Surgery: 5/8/2024    Margy Valdez is a 78 y.o. year old female who presents to the clinic today for routine follow up 2 weeks status post removal of hardware and left ankle/subtalar arthrodesis.  Overall doing well however she is feels that she is somewhat \"foggy\" with her cognition.  States this is abnormal for after being under anesthesia.  Asks if there are any changes to her current medication to help alleviate this.  Denies interval trauma    Review of Systems  Gen: no fever, chills, malaise  CV: no chest pain or palpitations  Resp: no cough or shortness of breath  GI: no nausea, vomiting, diarrhea, or constipation  Neuro: no seizures, vertigo, or headache  Msk: Minimal left ankle pain  10 remaining systems reviewed and negative    Objective :   There were no vitals filed for this visit.Body mass index is 41.98 kg/m².  General: No acute distress, resting comfortably in the clinic  Neuro: alert. oriented  Eyes: Extra-ocular muscles intact  Pulm: Respirations unlabored and regular.  Skin: warm, well perfused  Psych:   Patient has good fund of knowledge and displays understanding of exam, diagnosis, and plan.  MSK: LLE: Surgical incisions healing  Appropriately at this time with retained sutures.  Discoloration around distal aspect of anteromedial incision as well as subtalar incision consistent with venous congestion as opposed to infection. compartments soft. 2+ DP/PT pulses with BCR. TA/EHL/FHL/GS motor intact. Saph/Valeria/DP/SP nerves SILT    Radiology:  No new radiographs today. Reviewed pre and post op studies with patient to explain injury and surgery.     Assessment:   78 y.o. year old female with left ankle and subtalar arthrodesis  Plan:   Incisions healing well with sutures intact. Sutures were removed without

## 2024-05-22 ENCOUNTER — TELEPHONE (OUTPATIENT)
Dept: ORTHOPEDIC SURGERY | Age: 79
End: 2024-05-22

## 2024-05-24 ENCOUNTER — TELEPHONE (OUTPATIENT)
Dept: ORTHOPEDIC SURGERY | Age: 79
End: 2024-05-24

## 2024-05-24 NOTE — TELEPHONE ENCOUNTER
Date of Procedure: 5/8/2024     Pre-Op Diagnosis:  Left ankle posttraumatic arthritis  Retained hardware of left distal tibia     Post-Op Diagnosis:   Left ankle posttraumatic arthritis  Retained hardware of left distal tibia       Procedure(s):  Removal of hardware from left distal tibia  Left ankle open arthrodesis  Left subtalar arthrodesis      Patient daughter called in and stated that patient surgical incision warm and red to the touch and also her foot, denies fever, drainage, odor but did state that patient blood sugar was elevated as well.    Informed her that Dr. Hawkins was out of office until Tuesday and could schedule patient and or she could present to ED/urgent care. Daughter stated that she would take her to ED and schedule with Dr. Hawkins as well.

## 2024-05-24 NOTE — TELEPHONE ENCOUNTER
Talita from [facility] is requesting patients office notes from 5/21 with Dr Hawkins.    Please fax to [phone].    Please return her call to [phone] if any questions/concerns.    Thank you.

## 2024-05-24 NOTE — TELEPHONE ENCOUNTER
Note not yet completed to be faxed, sent provider message requesting to be notified once office note signed.

## 2024-05-28 ENCOUNTER — OFFICE VISIT (OUTPATIENT)
Dept: ORTHOPEDIC SURGERY | Age: 79
End: 2024-05-28

## 2024-05-28 VITALS — BODY MASS INDEX: 40.46 KG/M2 | HEIGHT: 64 IN | WEIGHT: 237 LBS

## 2024-05-28 DIAGNOSIS — S82.872G CLOSED DISPLACED PILON FRACTURE OF LEFT TIBIA WITH DELAYED HEALING, SUBSEQUENT ENCOUNTER: Primary | ICD-10-CM

## 2024-05-28 PROCEDURE — 99024 POSTOP FOLLOW-UP VISIT: CPT | Performed by: ORTHOPAEDIC SURGERY

## 2024-05-28 NOTE — PROGRESS NOTES
[facility]  [address]  [place] [address]  Dept Phone: [phone]  Dept Fax: [phone]      Orthopaedic Trauma Clinic Follow Up      Subjective:   Date of Surgery:   5/8/2024: Removal of harware from left distal tibia, left ankle arthrodesis, left subtalar arthrodesis  4/27/2022: Removal of left tibia max frame  3/16/2022: ORIF left pilon fracture and IMN of left fibula  3/2/2022: Ex-Fix pin exchange  2/10/2022: Closed reduction of left pilon fracture and application of circular external fixator    Margy Valdez is a 78 y.o. year old female who presents to the clinic today for follow up approximately 3 weeks from surgery listed above.  Patient was recovering well following surgery, but around 1 week ago became concerned for possible developing infection when patient and family noticed increased redness surrounding the incision on the anterior and lateral aspect of the left lower extremity.  Patient presented to urgent care where she was started on doxycycline.  Patient's reports that since starting the doxycycline a few days ago that the redness and swelling has significantly decreased.  Patient denies experiencing constitutional symptoms accompanying the cellulitis.  Patient also complained of some irritation related to retained suture in the left heel.  Patient states that otherwise pain is well-controlled and that she has been compliant with nonweightbearing to left lower extremity.  Patient denies any other complaints this time.      Review of Systems  Gen: no fever, chills, malaise  CV: no chest pain or palpitations  Resp: no cough or shortness of breath  GI: no nausea, vomiting, diarrhea, or constipation  Neuro: no seizures, vertigo, or headache  Msk: Left lower extremity pain and redness surrounding anterior and lateral surgical incisions  10 remaining systems reviewed and negative    Objective :   There were no vitals filed for this visit.Body mass index is 40.68

## 2024-06-10 ENCOUNTER — TELEPHONE (OUTPATIENT)
Dept: ORTHOPEDIC SURGERY | Age: 79
End: 2024-06-10

## 2024-06-10 NOTE — TELEPHONE ENCOUNTER
Patient was told by Dr. Hawkins that if the cellulitis comes back that he would prescribe an antibiotic for the left leg, dos 5/8/24 L ankle hw removal/L ankle fusion.  Patient has an appt. Already scheduled for 6/18/24.  Please advise.  Thanks.

## 2024-06-12 NOTE — TELEPHONE ENCOUNTER
Date of Surgery:   5/8/2024: Removal of harware from left distal tibia, left ankle arthrodesis, left subtalar arthrodesis  4/27/2022: Removal of left tibia max frame  3/16/2022: ORIF left pilon fracture and IMN of left fibula  3/2/2022: Ex-Fix pin exchange  2/10/2022: Closed reduction of left pilon fracture and application of circular external fixator    Patient daughter stating that she is getting cellulitis again and would like order for antibiotic. Please advise

## 2024-06-18 ENCOUNTER — OFFICE VISIT (OUTPATIENT)
Dept: ORTHOPEDIC SURGERY | Age: 79
End: 2024-06-18

## 2024-06-18 VITALS — HEIGHT: 64 IN | WEIGHT: 237 LBS | BODY MASS INDEX: 40.46 KG/M2

## 2024-06-18 DIAGNOSIS — R52 PAIN: Primary | ICD-10-CM

## 2024-06-18 PROCEDURE — 99024 POSTOP FOLLOW-UP VISIT: CPT | Performed by: ORTHOPAEDIC SURGERY

## 2024-06-18 NOTE — PROGRESS NOTES
[facility]  [address]  [place] [address]  Dept Phone: [phone]  Dept Fax: [phone]      Orthopaedic Trauma Clinic Follow Up      Subjective:   Date of Surgery: (5 weeks, 6 days)  5/8/2024: Removal of harware from left distal tibia, left ankle arthrodesis, left subtalar arthrodesis  4/27/2022: Removal of left tibia max frame  3/16/2022: ORIF left pilon fracture and IMN of left fibula  3/2/2022: Ex-Fix pin exchange  2/10/2022: Closed reduction of left pilon fracture and application of circular external fixator    Margy Valdez is a 78 y.o. female who presents to the clinic today for follow up approximately 6 weeks from surgery listed above. She reports doing very well overall, denies any issues with her incision sites. She does report a minor falling incident when trying to transfer from her wheeled chair to the bedside commode; however, she denies hitting her head, any LOC, any significant trauma incurred at that time. She did not seek evaluation and reports recovering entirely from that accident. She denies any significant pain whatsoever about her surgical site. Denies numbness, burning, tingling. Patient's daughter present at bedside during entire evaluation.      Review of Systems  Gen: no fever, chills, malaise  CV: no chest pain or palpitations  Resp: no cough or shortness of breath  GI: no nausea, vomiting, diarrhea, or constipation  Neuro: no seizures, vertigo, or headache  Msk: left ankle pain  10 remaining systems reviewed and negative    Objective :   There were no vitals filed for this visit.Body mass index is 40.68 kg/m².  General: No acute distress, resting comfortably in the clinic  Neuro: alert. oriented  Eyes: Extra-ocular muscles intact  Pulm: Respirations unlabored and regular.  Skin: warm, well perfused  Psych:   Patient has good fund of knowledge and displays understanding of exam, diagnosis, and plan.  MSK:   LLE:   Incisions overall healed. One

## 2024-07-30 ENCOUNTER — OFFICE VISIT (OUTPATIENT)
Dept: ORTHOPEDIC SURGERY | Age: 79
End: 2024-07-30

## 2024-07-30 VITALS — BODY MASS INDEX: 40.46 KG/M2 | WEIGHT: 237 LBS | HEIGHT: 64 IN

## 2024-07-30 DIAGNOSIS — R52 PAIN: Primary | ICD-10-CM

## 2024-07-30 DIAGNOSIS — Z98.1 S/P ANKLE ARTHRODESIS: ICD-10-CM

## 2024-07-30 PROCEDURE — 99024 POSTOP FOLLOW-UP VISIT: CPT | Performed by: ORTHOPAEDIC SURGERY
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
## 2024-09-24 ENCOUNTER — OFFICE VISIT (OUTPATIENT)
Dept: ORTHOPEDIC SURGERY | Age: 79
End: 2024-09-24

## 2024-09-24 VITALS — HEIGHT: 64 IN | BODY MASS INDEX: 40.46 KG/M2 | WEIGHT: 237 LBS

## 2024-09-24 DIAGNOSIS — M19.072 OSTEOARTHRITIS OF LEFT ANKLE, UNSPECIFIED OSTEOARTHRITIS TYPE: ICD-10-CM

## 2024-09-24 DIAGNOSIS — R52 PAIN: Primary | ICD-10-CM
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
## 2024-12-17 ENCOUNTER — OFFICE VISIT (OUTPATIENT)
Dept: ORTHOPEDIC SURGERY | Age: 79
End: 2024-12-17

## 2024-12-17 VITALS — BODY MASS INDEX: 40.46 KG/M2 | HEIGHT: 64 IN | WEIGHT: 237 LBS

## 2024-12-17 DIAGNOSIS — Z98.1 S/P ANKLE ARTHRODESIS: Primary | ICD-10-CM

## 2024-12-17 NOTE — PROGRESS NOTES
[facility]  [address]  [facility] [address]  Dept Phone: [phone]  Dept Fax: [phone]      [facility]th[facility]uma Clinic Follow Up      Subjective:   Date of Surgery:   5/8/2024: Removal of harware from left distal tibia, left ankle arthrodesis, left subtalar arthrodesis  4/27/2022: Removal of left tibia max frame  3/16/2022: ORIF left pilon fracture and IMN of left fibula  3/2/2022: Ex-Fix pin exchange  2/10/2022: Closed reduction of left pilon fracture and application of circular external fixator    Margy Valdez is a 79 y.o. year old female who presents to the clinic today for routine follow up approximate 7 months after left ankle arthrodesis with hindfoot nail.  Patient states that the previous pain over the posterior heel has resolved.  She has some discomfort along the anterior ankle with prolonged activity.  Several weeks ago, she had a fall directly onto her sacrum.  She states she has had radiographic studies which showed no fracture but still having significant pain with ambulation and prolonged sitting.  Is intermittently using a wheelchair due to this pain.  Denies hitting twisting or causing any symptoms to the ankle during the fall.  She was given order for orthotics at last visit but she has not followed up thus far.    Review of Systems  Gen: no fever, chills, malaise  CV: no chest pain or palpitations  Resp: no cough or shortness of breath  GI: no nausea, vomiting, diarrhea, or constipation  Neuro: no seizures, vertigo, or headache  Msk: Mild anterior ankle pain.  Sacral pain  10 remaining systems reviewed and negative    Objective :   There were no vitals filed for this visit.Body mass index is 40.68 kg/m².  General: No acute distress, resting comfortably in the clinic  Neuro: alert. oriented  Eyes: Extra-ocular muscles intact  Pulm: Respirations unlabored and regular.  Skin: warm, well perfused  Psych:   Patient has good fund of knowledge and displays

## 2025-03-15 NOTE — TELEPHONE ENCOUNTER
Copied from CRM #[id number]. Topic: MW Paging - MW Paging Request  >> Mar 15, 2025  4:03 AM Kera GORDILLO wrote:  Elsie called to page clinician;  Sent [employer] page. Selected [employer] location [place] clinician. Paged on-call clinician.Selected 'Wrap up CRM' and created new Telephone Encounter after clicking 'Convert to Clinical Call'. Selected reason for call 'MD BENITEZ'. Copied page into [employer] template and routed as routine priority to appropriate pool per on-call clinician KB page.   Gabapentin refill request, to [address] [facility] [facility] in [facility], pharmacy updated.

## (undated) DEVICE — TUBING AMB

## (undated) DEVICE — SPLINT CAST W5XL30IN GRN STRENGTH PLSTR OF PARIS FAST SET

## (undated) DEVICE — GOWN,AURORA,NONREINFORCED,LARGE: Brand: MEDLINE

## (undated) DEVICE — CYSTO/BLADDER IRRIGATION SET, REGULATING CLAMP

## (undated) DEVICE — REAMER SHAFT, MOD.TRINKLE: Brand: BIXCUT

## (undated) DEVICE — SUTURE VCRL SZ 0 L18IN ABSRB UD L36MM CT-1 1/2 CIR J840D

## (undated) DEVICE — SPLINT ORTH W3XL15IN PLSTR OF PARIS LO EXOTHERM SMOOTH

## (undated) DEVICE — INTENDED FOR TISSUE SEPARATION, AND OTHER PROCEDURES THAT REQUIRE A SHARP SURGICAL BLADE TO PUNCTURE OR CUT.: Brand: BARD-PARKER ® CARBON RIB-BACK BLADES

## (undated) DEVICE — APPLICATOR MEDICATED 26 CC SOLUTION HI LT ORNG CHLORAPREP

## (undated) DEVICE — 1LYRTR 16FR10ML100%SIL UMS SNP: Brand: MEDLINE INDUSTRIES, INC.

## (undated) DEVICE — GARMENT,MEDLINE,DVT,INT,CALF,MED, GEN2: Brand: MEDLINE

## (undated) DEVICE — SUTURE ETHILON SZ 2-0 L18IN NONABSORBABLE BLK L26MM PS 3/8 585H

## (undated) DEVICE — BANDAGE COBAN 6 IN WND 6INX5YD FOAM

## (undated) DEVICE — FREEHAND DRILL

## (undated) DEVICE — BLADE,CARBON-STEEL,15,STRL,DISPOSABLE: Brand: MEDLINE

## (undated) DEVICE — DRAPE,REIN 53X77,STERILE: Brand: MEDLINE

## (undated) DEVICE — BIT DRL L200MM DIA2.8MM CALIB L100MM FOR 3.5MM VA LCP PROX

## (undated) DEVICE — SVMMC ORTH SPL DRP PK

## (undated) DEVICE — DRESSING,GAUZE,XEROFORM,CURAD,1"X8",ST: Brand: CURAD

## (undated) DEVICE — 3.0MM ROUND CARBIDE BUR

## (undated) DEVICE — SOLUTION SCRB 4OZ 4% CHG H2O AIDED FOR PREOPERATIVE SKIN

## (undated) DEVICE — POST EXT FIX 1 H WIRE MR CONDITIONAL FOR DISTR OSTEOGENESIS

## (undated) DEVICE — GAUZE,SPONGE,FLUFF,6"X6.75",STRL,5/TRAY: Brand: MEDLINE

## (undated) DEVICE — 3.5MM DRILL BIT

## (undated) DEVICE — DRAPE,U/ SHT,SPLIT,PLAS,STERIL: Brand: MEDLINE

## (undated) DEVICE — GLOVE ORTHO 8   MSG9480

## (undated) DEVICE — FOAM BUMP, LARGE: Brand: MEDLINE INDUSTRIES, INC.

## (undated) DEVICE — GLOVE SURG SZ 65 THK91MIL LTX FREE SYN POLYISOPRENE

## (undated) DEVICE — C-ARM: Brand: UNBRANDED

## (undated) DEVICE — GLOVE ORANGE PI 8   MSG9080

## (undated) DEVICE — BANDAGE,GAUZE,BULKEE II,4.5"X4.1YD,STRL: Brand: MEDLINE

## (undated) DEVICE — Device

## (undated) DEVICE — POST EXT FIX WIRE TALL MR SAFE

## (undated) DEVICE — NUT EXT FIX MRI SAFE FOR DISTR OSTEOGENESIS RNG SYS EA

## (undated) DEVICE — 60-7070-106 TRNQT,DPSB,PLC BROWN: Brand: MEDLINE RENEWAL

## (undated) DEVICE — PROTECTOR ULN NRV PUR FOAM HK LOOP STRP ANATOMICALLY

## (undated) DEVICE — FOOTPLATE BNE LNG L180MM ALUMINUM MAXFRAME

## (undated) DEVICE — GLOVE ORANGE PI 7 1/2   MSG9075

## (undated) DEVICE — PADDING,UNDERCAST,COTTON, 4"X4YD STERILE: Brand: MEDLINE

## (undated) DEVICE — SPONGE LAP W18XL18IN WHT COT 4 PLY FLD STRUNG RADPQ DISP ST 2 PER PACK

## (undated) DEVICE — FFN 6.5MM DRILL: Brand: ACUMED

## (undated) DEVICE — SUTURE VCRL SZ 2-0 L18IN ABSRB UD CT-1 L36MM 1/2 CIR J839D

## (undated) DEVICE — GUIDE WIRE, SMOOTH-TIPPED, STERILE

## (undated) DEVICE — STRAP ARMBRD W1.5XL32IN FOAM STR YET SFT W/ HK AND LOOP

## (undated) DEVICE — Z INACTIVE USE 2735373 APPLICATOR FBR LAIN COT WOOD TIP ECONOMICAL

## (undated) DEVICE — GLOVE ORANGE PI 8 1/2   MSG9085

## (undated) DEVICE — BANDAGE COMPR W6INXL12FT SMOOTH FOR LIMB EXSANG ESMARCH

## (undated) DEVICE — Z DISCONTINUED BY MEDLINE USE 2711682 TRAY SKIN PREP DRY W/ PREM GLV

## (undated) DEVICE — SUTURE NONABSORBABLE MONOFILAMENT 3-0 PS-1 18 IN BLK ETHILON 1663H

## (undated) DEVICE — TUBING SUCT 12FR MAL ALUM SHFT FN CAP VENT UNIV CONN W/ OBT

## (undated) DEVICE — INTENT TO BE USED WITH SUTURE MATERIAL FOR TISSUE CLOSURE: Brand: RICHARD-ALLAN® NEEDLE 1/2 CIRCLE TAPER

## (undated) DEVICE — DRILL, AO, STERILE

## (undated) DEVICE — LOCKING SCREW
Type: IMPLANTABLE DEVICE | Site: ANKLE | Status: NON-FUNCTIONAL
Brand: T2 ALPHA

## (undated) DEVICE — SOLUTION IRRIG 3000ML 0.9% SOD CHL USP UROMATIC PLAS CONT

## (undated) DEVICE — SUTURE VICRYL SZ 2-0 L18IN ABSRB UD CT-1 L36MM 1/2 CIR J839D

## (undated) DEVICE — BANDAGE COMPR W6INXL15YD WHT BGE POLY COT WV E HK LOOP CLSR

## (undated) DEVICE — BNDG,ELSTC,MATRIX,STRL,6"X5YD,LF,HOOK&LP: Brand: MEDLINE

## (undated) DEVICE — STRAP,POSITIONING,KNEE/BODY,FOAM,4X60": Brand: MEDLINE

## (undated) DEVICE — SOLUTION IV IRRIG POUR BRL 0.9% SODIUM CHL 2F7124

## (undated) DEVICE — GOWN,SIRUS,NONRNF,SETINSLV,XL,20/CS: Brand: MEDLINE

## (undated) DEVICE — K-WIRE
Type: IMPLANTABLE DEVICE | Site: ANKLE | Status: NON-FUNCTIONAL
Removed: 2024-05-08

## (undated) DEVICE — TEFLON TUBE

## (undated) DEVICE — C-ARMOR C-ARM EQUIPMENT COVERS CLEAR STERILE UNIVERSAL FIT 12 PER CASE: Brand: C-ARMOR

## (undated) DEVICE — BNDG,ELSTC,MATRIX,STRL,4"X5YD,LF,HOOK&LP: Brand: MEDLINE

## (undated) DEVICE — SCREW BNE L10MM DIA3.5MM CORT S STL ST LOK FULL THRD: Type: IMPLANTABLE DEVICE | Site: LEG | Status: NON-FUNCTIONAL

## (undated) DEVICE — THE STERILE LIGHT HANDLE COVER IS USED WITH STERIS SURGICAL LIGHTING AND VISUALIZATION SYSTEMS.

## (undated) DEVICE — 3M™ IOBAN™ 2 ANTIMICROBIAL INCISE DRAPE 6650EZ: Brand: IOBAN™ 2

## (undated) DEVICE — FFN 3.1MM REAMER: Brand: ACUMED

## (undated) DEVICE — GOWN,SIRUS,NONRNF,SETINSLV,2XL,18/CS: Brand: MEDLINE

## (undated) DEVICE — BOLT EXT FIX OFFSET WIRE MR CONDITIONAL FOR DISTR

## (undated) DEVICE — SOLUTION PREP POVIDONE IOD FOR SKIN MUCOUS MEM PRIOR TO

## (undated) DEVICE — POST EXT FIX 2 H WIRE

## (undated) DEVICE — BIT DRL L110MM DIA2.5MM G QUIK CPL W/O STP REUSE

## (undated) DEVICE — BIT DRL L180MM DIA2.5MM G QUIK CPL W/O STP REUSE

## (undated) DEVICE — Z DISCONTINUED USE 2220295 SUTURE VICRYL SZ 0 L18IN ABSRB UD L36MM CT-1 1/2 CIR J840D

## (undated) DEVICE — ZIMMER® STERILE DISPOSABLE TOURNIQUET CUFF WITH PROTECTIVE SLEEVE AND PLC, DUAL PORT, SINGLE BLADDER, 34 IN. (86 CM)

## (undated) DEVICE — BOLT EXT FIX LNG CONN MR CONDITIONAL FOR DISTR OSTEOGENESIS

## (undated) DEVICE — BIT DRL L140MM DIA2MM QUIK CPL 3 FLUT CALIB DEPTH MRK W/O

## (undated) DEVICE — PAD,ABDOMINAL,5"X9",ST,LF,25/BX: Brand: MEDLINE INDUSTRIES, INC.

## (undated) DEVICE — PADDING CAST W6INXL4YD COT LO LINTING WYTEX

## (undated) DEVICE — RING FIX FULL SZ 180MM ALUMINUM MAXFRAME

## (undated) DEVICE — NEPTUNE E-SEP 165MM SUCTION SLEEVE: Brand: NEPTUNE E-SEP

## (undated) DEVICE — POST EXT FIX SHT WIRE MR CONDITIONAL FOR DISTR OSTEOGENESIS

## (undated) DEVICE — 1010 S-DRAPE TOWEL DRAPE 10/BX: Brand: STERI-DRAPE™

## (undated) DEVICE — GLOVE ORANGE PI 7   MSG9070

## (undated) DEVICE — SUTURE PDS II SZ 0 L27IN ABSRB VLT L36MM CT-1 1/2 CIR Z340H

## (undated) DEVICE — NUT EXT FIX MRI SAFE FOR DISTR OSTEOGENESIS RNG SYS 10 PER

## (undated) DEVICE — SURGICAL SUCTION CONNECTING TUBE WITH MALE CONNECTOR AND SUCTION CLAMP, 2 FT. LONG (.6 M), 5 MM I.D.: Brand: CONMED

## (undated) DEVICE — SPONGE LAP W18XL18IN WHT COT 4 PLY FLD STRUNG RADPQ DISP ST

## (undated) DEVICE — CONTAINER,SPECIMEN,4OZ,OR STRL: Brand: MEDLINE

## (undated) DEVICE — SYRINGE, LUER LOCK, 10ML: Brand: MEDLINE

## (undated) DEVICE — UNIT CLD THER STK LOK LID GEN PURP CLR DSGN W UNIV WRP ON

## (undated) DEVICE — TOTAL TRAY, 16FR 10ML SIL FOLEY, URN: Brand: MEDLINE

## (undated) DEVICE — KIT FTPLT SUPP MAXFRAME

## (undated) DEVICE — GUIDE WIRE, BALL-TIPPED, STERILE

## (undated) DEVICE — GOWN,SURGICAL,AURORA,SLEEVE: Brand: MEDLINE

## (undated) DEVICE — SUTURE MONOCRYL SZ 2-0 L27IN ABSRB UD SH L26MM TAPERPOINT NDL Y417H

## (undated) DEVICE — BLADE ES L6IN ELASTOMERIC COAT EXT DURABLE BEND UPTO 90DEG

## (undated) DEVICE — END CAP, SCN
Type: IMPLANTABLE DEVICE | Site: ANKLE | Status: NON-FUNCTIONAL
Brand: T2

## (undated) DEVICE — SUTURE MCRYL SZ 3-0 L27IN ABSRB UD L24MM PS-1 3/8 CIR PRIM Y936H

## (undated) DEVICE — STANDOFF EXT FIX L40MM MR CONDITIONAL FOR DISTR